# Patient Record
Sex: FEMALE | Race: WHITE | Employment: OTHER | URBAN - METROPOLITAN AREA
[De-identification: names, ages, dates, MRNs, and addresses within clinical notes are randomized per-mention and may not be internally consistent; named-entity substitution may affect disease eponyms.]

---

## 2017-01-21 DIAGNOSIS — Z76.0 MEDICATION REFILL: ICD-10-CM

## 2017-01-22 RX ORDER — COLESEVELAM HYDROCHLORIDE 625 MG/1
TABLET, FILM COATED ORAL
Qty: 180 TAB | Refills: 3 | Status: SHIPPED | OUTPATIENT
Start: 2017-01-22 | End: 2017-05-20 | Stop reason: SDUPTHER

## 2017-02-21 RX ORDER — GLIPIZIDE 10 MG/1
TABLET, FILM COATED, EXTENDED RELEASE ORAL
Qty: 60 TAB | Refills: 10 | Status: SHIPPED | OUTPATIENT
Start: 2017-02-21 | End: 2017-02-23 | Stop reason: SDUPTHER

## 2017-02-23 ENCOUNTER — OFFICE VISIT (OUTPATIENT)
Dept: INTERNAL MEDICINE CLINIC | Age: 77
End: 2017-02-23

## 2017-02-23 VITALS
RESPIRATION RATE: 16 BRPM | BODY MASS INDEX: 33.04 KG/M2 | OXYGEN SATURATION: 96 % | TEMPERATURE: 97.9 F | HEART RATE: 65 BPM | WEIGHT: 198.3 LBS | SYSTOLIC BLOOD PRESSURE: 160 MMHG | DIASTOLIC BLOOD PRESSURE: 52 MMHG | HEIGHT: 65 IN

## 2017-02-23 DIAGNOSIS — E11.3299 TYPE 2 DIABETES MELLITUS WITH MILD NONPROLIFERATIVE RETINOPATHY WITHOUT MACULAR EDEMA, WITHOUT LONG-TERM CURRENT USE OF INSULIN, UNSPECIFIED LATERALITY (HCC): Primary | Chronic | ICD-10-CM

## 2017-02-23 DIAGNOSIS — Z76.0 MEDICATION REFILL: ICD-10-CM

## 2017-02-23 DIAGNOSIS — Z23 ENCOUNTER FOR IMMUNIZATION: ICD-10-CM

## 2017-02-23 DIAGNOSIS — I10 ESSENTIAL HYPERTENSION: Chronic | ICD-10-CM

## 2017-02-23 DIAGNOSIS — E07.9 THYROID DISEASE: Chronic | ICD-10-CM

## 2017-02-23 RX ORDER — GLIPIZIDE 5 MG/1
TABLET, FILM COATED, EXTENDED RELEASE ORAL
Qty: 90 TAB | Refills: 11 | Status: SHIPPED | OUTPATIENT
Start: 2017-02-23 | End: 2018-02-24 | Stop reason: SDUPTHER

## 2017-02-23 RX ORDER — GLIPIZIDE 10 MG/1
TABLET, FILM COATED, EXTENDED RELEASE ORAL
Qty: 60 TAB | Refills: 10 | Status: CANCELLED | OUTPATIENT
Start: 2017-02-23

## 2017-02-23 RX ORDER — LEVOTHYROXINE SODIUM 112 UG/1
112 TABLET ORAL
Qty: 90 TAB | Refills: 4 | Status: SHIPPED | OUTPATIENT
Start: 2017-02-23 | End: 2018-03-09 | Stop reason: SDUPTHER

## 2017-02-23 RX ORDER — FUROSEMIDE 20 MG/1
10 TABLET ORAL DAILY
Qty: 45 TAB | Refills: 11 | Status: SHIPPED | OUTPATIENT
Start: 2017-02-23 | End: 2017-08-04

## 2017-02-23 NOTE — MR AVS SNAPSHOT
Visit Information Date & Time Provider Department Dept. Phone Encounter #  
 2/23/2017 11:00 AM Friendshipaaron GuzmanSherry Kout [phone] [id number] Follow-up Instructions Return in about 4 months (around 6/23/2017) for HTN, DM, cholesterol. Upcoming Health Maintenance Date Due Pneumococcal 65+ Low/Medium Risk (2 of 2 - PPSV23) 10/1/2015 FOOT EXAM Q1 2/26/2017 MICROALBUMIN Q1 2/26/2017 HEMOGLOBIN A1C Q6M 4/3/2017 EYE EXAM RETINAL OR DILATED Q1 6/8/2017 MEDICARE YEARLY EXAM 6/18/2017 LIPID PANEL Q1 10/3/2017 GLAUCOMA SCREENING Q2Y 6/8/2018 DTaP/Tdap/Td series (2 - Td) 6/17/2026 Allergies as of 2/23/2017  Review Complete On: 2/23/2017 By: Patrica Guzman MD  
  
 Severity Noted Reaction Type Reactions Latex High 11/06/2012   Topical Rash Other Plant, Animal, Environmental Medium 08/14/2014   Intolerance Other (comments) Surgical Steel, Related to ankle fracture and it caused bursitis and was removed. Acetaminophen  11/15/2011    Other (comments) Abdominal pain Adhesive  11/06/2013    Hives Pt also states she has burning and stinging Morphine  12/03/2013    Other (comments) Agitated, nasty Statins-hmg-coa Reductase Inhibitors  11/16/2011    Other (comments) Leg cramps Sulfa (Sulfonamide Antibiotics)  11/16/2011    Rash Tramadol  11/16/2011    Nausea Only Current Immunizations  Never Reviewed Name Date Influenza High Dose Vaccine PF 10/6/2016 Pneumococcal Conjugate (PCV-13)  Incomplete Pneumococcal Vaccine (Unspecified Type) 10/1/2010 Tdap 6/17/2016 10:55 AM  
 Zoster 1/1/2008 Not reviewed this visit You Were Diagnosed With   
  
 Codes Comments Type 2 diabetes mellitus with mild nonproliferative retinopathy without macular edema, without long-term current use of insulin, unspecified laterality    -  Primary ICD-10-CM: O80.6635 ICD-9-CM: 250.50, 362.04 Essential hypertension     ICD-10-CM: I10 
ICD-9-CM: 401.9 Thyroid disease     ICD-10-CM: E07.9 ICD-9-CM: 246.9 Medication refill     ICD-10-CM: Z76.0 ICD-9-CM: V68.1 Encounter for immunization     ICD-10-CM: C07 ICD-9-CM: V03.89 Vitals BP  
  
  
  
  
  
 160/52 BMI and BSA Data Body Mass Index Body Surface Area  
 33 kg/m 2 2.03 m 2 Preferred Pharmacy Pharmacy Name Phone [facility] [place], [address] [place], [place] [id number] [phone] Your Updated Medication List  
  
   
This list is accurate as of: 2/23/17 12:05 PM.  Always use your most recent med list.  
  
  
  
  
 ALPHAGAN P 0.1 % ophthalmic solution Generic drug:  brimonidine  
every eight (8) hours. ammonium lactate 12 % topical cream  
Commonly known as:  AMLACTIN Apply  to affected area two (2) times a day. rub in to affected area well  
  
 aspirin delayed-release 81 mg tablet Take  by mouth daily. estradiol 0.01 % (0.1 mg/gram) vaginal cream  
Commonly known as:  ESTRACE Insert 2 g into vagina daily. etodolac 400 mg tablet Commonly known as:  LODINE  
TAKE ONE TABLET BY MOUTH DAILY (GENERIC LODINE) furosemide 20 mg tablet Commonly known as:  LASIX Take 0.5 Tabs by mouth daily. Indications: Edema  
  
 glipiZIDE SR 5 mg CR tablet Commonly known as:  GLUCOTROL XL Take 2 tablets by mouth in the morning and one in the evening with meals  
  
 levothyroxine 112 mcg tablet Commonly known as:  SYNTHROID Take 1 Tab by mouth Daily (before breakfast). Indications: hypothyroidism  
  
 metoprolol succinate 50 mg XL tablet Commonly known as:  TOPROL-XL Take 1 Tab by mouth daily. Indications: HYPERTENSION  
  
 naproxen-diphenhydramine 220-25 mg Tab Take  by mouth. omeprazole 20 mg capsule Commonly known as:  PRILOSEC  
TAKE ONE CAPSULE BY MOUTH EVERY DAY WELCHOL 625 mg tablet Generic drug:  colesevelam  
TAKE THREE TABLETS BY MOUTH TWICE A DAY  
  
 zolpidem 10 mg tablet Commonly known as:  AMBIEN  
TAKE ONE TABLET BY MOUTH NIGHTLY AS NEEDED FOR SLEEP Prescriptions Sent to Pharmacy Refills  
 levothyroxine (SYNTHROID) 112 mcg tablet 4 Sig: Take 1 Tab by mouth Daily (before breakfast). Indications: hypothyroidism Class: Normal  
 Pharmacy: [address] Ph #: [phone] Route: Oral  
 furosemide (LASIX) 20 mg tablet 11 Sig: Take 0.5 Tabs by mouth daily. Indications: Edema Class: Normal  
 Pharmacy: [address] Ph #: [phone] Route: Oral  
 glipiZIDE SR (GLUCOTROL XL) 5 mg CR tablet 11 Sig: Take 2 tablets by mouth in the morning and one in the evening with meals Class: Normal  
 Pharmacy: [address] Ph #: [phone] We Performed the Following ADMIN PNEUMOCOCCAL VACCINE [ [facility]] HM DIABETES FOOT EXAM [HM[facility]] PNEUMOCOCCAL CONJ VACCINE 13 VALENT IM [id number] CPT(R)] Follow-up Instructions Return in about 4 months (around 6/23/2017) for HTN, DM, cholesterol. Introducing [facility]! Romayne Duster introduces [employer] patient portal. Now you can access parts of your medical record, email your doctor's office, and request medication refills online. 1. In your internet browser, go to [email or web address] 2. Click on the First Time User? Click Here link in the Sign In box. You will see the New Member Sign Up page. 3. Enter your [employer] Access Code exactly as it appears below. You will not need to use this code after youve completed the sign-up process. If you do not sign up before the expiration date, you must request a new code. · [employer] Access Code: [email or web address] Expires: 5/24/2017 12:04 PM 
 
 4. Enter the last four digits of your Social Security Number (xxxx) and Date of Birth (mm/dd/yyyy) as indicated and click Submit. You will be taken to the next sign-up page. 5. Create a eCo[employer] ID. This will be your [employer] login ID and cannot be changed, so think of one that is secure and easy to remember. 6. Create a e[employer] password. You can change your password at any time. 7. Enter your Password Reset Question and Answer. This can be used at a later time if you forget your password. 8. Enter your e-mail address. You will receive e-mail notification when new information is available in [address] 9. Click Sign Up. You can now view and download portions of your medical record. 10. Click the Download Summary menu link to download a portable copy of your medical information. If you have questions, please visit the Frequently Asked Questions section of the [employer] website. Remember, [employer] is NOT to be used for urgent needs. For medical emergencies, dial 911. Now available from your iPhone and Android! Please provide this summary of care documentation to your next provider. Your primary care clinician is listed as [facility]. If you have any questions after today's visit, please call [phone].

## 2017-02-23 NOTE — PROGRESS NOTES
ROOM # 4    Pt presents today for follow up HTN, DM, Chol    Pt preferred language for health care discussion is english. Is someone accompanying this pt? no    Is the patient using any DME equipment during OV? no    Depression Screening completed. Active Dx    Learning Assessment completed. Yes    Abuse Screening completed. Yes    Health Maintenance reviewed and discussed per provider. Yes, up to date    Advance Directive:  1. Do you have an advance directive in place? Patient Reply: No    2. If not, would you like material regarding how to put one in place? Patient Reply: No    Coordination of Care:  1. Have you been to the ER, urgent care clinic since your last visit? Hospitalized since your last visit? No    2. Have you seen or consulted any other health care providers outside of the [address] [place] [address] since your last visit? Include any pap smears or colon screening.  No

## 2017-02-23 NOTE — PROGRESS NOTES
HISTORY OF PRESENT ILLNESS  Sabrina Bermudez is a 64 y.o. female. Visit Vitals    /52    Pulse 65    Temp 97.9 °F (36.6 °C) (Oral)    Resp 16    Ht 5' 5\" (1.651 m)    Wt 198 lb 4.8 oz (89.9 kg)    SpO2 96%    BMI 33 kg/m2       HPI Comments: Saw Dr. Luis Valiente, podiatry, for foot pain    Pt also saw Dr. Baltazar Becerra who advised laser surgery. She did not go back due to other illness intervene. She does not want to have surgery at [place] [address] but she felt his office is not clean or hygienic  She will contact him at some point to get this rescheduled. Hypertension    The history is provided by the patient. This is a chronic problem. The current episode started more than 1 week ago. The problem has not changed since onset. Pertinent negatives include no chest pain, no orthopnea and no palpitations. Associated symptoms comments: Other  . There are no associated agents to hypertension. Risk factors include postmenopause, obesity, a sedentary lifestyle, diabetes mellitus and dyslipidemia. Cholesterol Problem   The history is provided by the patient. This is a chronic problem. The current episode started more than 1 week ago. The problem occurs daily. Pertinent negatives include no chest pain. Exacerbated by: diet. The symptoms are relieved by medications (diet). Diabetes   The history is provided by the patient. This is a chronic problem. The current episode started more than 1 week ago. The problem occurs daily. Pertinent negatives include no chest pain. Exacerbated by: diet. The symptoms are relieved by medications (diet). Treatments tried: see meds. The treatment provided mild relief. Review of Systems   Constitutional: Negative. Respiratory: Negative. Cardiovascular: Positive for leg swelling. Negative for chest pain, palpitations, orthopnea and claudication. Physical Exam   Constitutional: She is oriented to person, place, and time. She appears well-developed and well-nourished. No distress. HENT:   Right Ear: Tympanic membrane, external ear and ear canal normal.   Left Ear: Tympanic membrane, external ear and ear canal normal.   Mouth/Throat: Uvula is midline, oropharynx is clear and moist and mucous membranes are normal.   Cardiovascular: Normal rate. Pulmonary/Chest: Effort normal.   Musculoskeletal: She exhibits no edema. Neurological: She is alert and oriented to person, place, and time. Diabetic foot exam:     Left: Reflexes 1+     Vibratory sensation     Proprioception normal   Sharp/dull discrimination     Filament test normal sensation with micro filament   Pulse DP: 1+ (weak)   Pulse PT:    Deformities: Yes - cool toes. Several healing wounds. Blisters on heel and lateral foot. Edema to above the ankle    Right: Reflexes 1+   Vibratory sensation    Proprioception normal   Sharp/dull discrimination    Filament test normal sensation with micro filament   Pulse DP: 1+ (weak)   Pulse PT:    Deformities: Mild - edema. Pressure callous under metatarsal.      Skin: Skin is warm and dry. She is not diaphoretic. Psychiatric: She has a normal mood and affect. Nursing note and vitals reviewed. ASSESSMENT and PLAN    ICD-10-CM ICD-9-CM    1. Type 2 diabetes mellitus with mild nonproliferative retinopathy without macular edema, without long-term current use of insulin, unspecified laterality E11.3299 250.50 HM DIABETES FOOT EXAM     362.04    2. Essential hypertension I10 401.9    3. hypothyroid E07.9 246.9    4. Medication refill Z76.0 V68.1 levothyroxine (SYNTHROID) 112 mcg tablet      furosemide (LASIX) 20 mg tablet      glipiZIDE SR (GLUCOTROL XL) 5 mg CR tablet   5. Encounter for immunization Z23 V03.89 PNEUMOCOCCAL CONJ VACCINE 13 VALENT IM      ADMIN PNEUMOCOCCAL VACCINE       She still has lab pending--will get done soon    She will f/u with Dr. Yang Lomeli and Dr. Sam Calixto. BP up some but is better overall    Weight discussed. She is working on diet.     F/u 4 months

## 2017-04-10 RX ORDER — OMEPRAZOLE 20 MG/1
CAPSULE, DELAYED RELEASE ORAL
Qty: 30 CAP | Refills: 9 | Status: SHIPPED | OUTPATIENT
Start: 2017-04-10 | End: 2018-04-29 | Stop reason: SDUPTHER

## 2017-04-17 RX ORDER — OMEPRAZOLE 20 MG/1
CAPSULE, DELAYED RELEASE ORAL
Qty: 30 CAP | Refills: 9 | Status: SHIPPED | OUTPATIENT
Start: 2017-04-17 | End: 2017-08-04 | Stop reason: SDUPTHER

## 2017-05-20 DIAGNOSIS — Z76.0 MEDICATION REFILL: ICD-10-CM

## 2017-05-21 RX ORDER — COLESEVELAM HYDROCHLORIDE 625 MG/1
TABLET, FILM COATED ORAL
Qty: 180 TAB | Refills: 2 | Status: SHIPPED | OUTPATIENT
Start: 2017-05-21 | End: 2017-08-04 | Stop reason: SDUPTHER

## 2017-06-19 DIAGNOSIS — Z76.0 MEDICATION REFILL: ICD-10-CM

## 2017-06-19 RX ORDER — METOPROLOL SUCCINATE 50 MG/1
50 TABLET, EXTENDED RELEASE ORAL DAILY
Qty: 90 TAB | Refills: 4 | Status: SHIPPED | OUTPATIENT
Start: 2017-06-19 | End: 2017-10-15 | Stop reason: SDUPTHER

## 2017-06-26 DIAGNOSIS — Z76.0 MEDICATION REFILL: ICD-10-CM

## 2017-06-26 RX ORDER — ZOLPIDEM TARTRATE 10 MG/1
TABLET ORAL
Qty: 30 TAB | Refills: 5 | Status: SHIPPED | OUTPATIENT
Start: 2017-06-26 | End: 2017-11-26

## 2017-07-20 ENCOUNTER — TELEPHONE (OUTPATIENT)
Dept: INTERNAL MEDICINE CLINIC | Age: 77
End: 2017-07-20

## 2017-07-20 NOTE — TELEPHONE ENCOUNTER
Patient states she is returning a call to the office, do not see a message in the system. Patient states the person did not leave a name but had a really soft voice? ??

## 2017-08-04 ENCOUNTER — HOSPITAL ENCOUNTER (OUTPATIENT)
Dept: LAB | Age: 77
Discharge: HOME | End: 2017-08-04
Payer: MEDICARE

## 2017-08-04 ENCOUNTER — OFFICE VISIT (OUTPATIENT)
Dept: INTERNAL MEDICINE CLINIC | Age: 77
End: 2017-08-04

## 2017-08-04 VITALS
SYSTOLIC BLOOD PRESSURE: 160 MMHG | HEART RATE: 66 BPM | TEMPERATURE: 97.4 F | RESPIRATION RATE: 16 BRPM | BODY MASS INDEX: 32.49 KG/M2 | DIASTOLIC BLOOD PRESSURE: 110 MMHG | WEIGHT: 195 LBS | OXYGEN SATURATION: 97 % | HEIGHT: 65 IN

## 2017-08-04 DIAGNOSIS — E11.3299 TYPE 2 DIABETES MELLITUS WITH MILD NONPROLIFERATIVE RETINOPATHY WITHOUT MACULAR EDEMA, WITHOUT LONG-TERM CURRENT USE OF INSULIN, UNSPECIFIED LATERALITY (HCC): Chronic | ICD-10-CM

## 2017-08-04 DIAGNOSIS — E78.00 HYPERCHOLESTEROLEMIA: Chronic | ICD-10-CM

## 2017-08-04 DIAGNOSIS — Z00.00 ROUTINE GENERAL MEDICAL EXAMINATION AT A HEALTH CARE FACILITY: Primary | ICD-10-CM

## 2017-08-04 DIAGNOSIS — E07.9 THYROID DISEASE: Chronic | ICD-10-CM

## 2017-08-04 DIAGNOSIS — I10 ESSENTIAL HYPERTENSION: Chronic | ICD-10-CM

## 2017-08-04 DIAGNOSIS — Z13.39 SCREENING FOR ALCOHOLISM: ICD-10-CM

## 2017-08-04 DIAGNOSIS — Z76.0 MEDICATION REFILL: ICD-10-CM

## 2017-08-04 LAB
ALBUMIN SERPL BCP-MCNC: 3.8 G/DL (ref 3.4–5)
ALBUMIN/GLOB SERPL: 1 {RATIO} (ref 0.8–1.7)
ALP SERPL-CCNC: 122 U/L (ref 45–117)
ALT SERPL-CCNC: 21 U/L (ref 13–56)
ANION GAP BLD CALC-SCNC: 7 MMOL/L (ref 3–18)
AST SERPL W P-5'-P-CCNC: 22 U/L (ref 15–37)
BILIRUB SERPL-MCNC: 0.5 MG/DL (ref 0.2–1)
BUN SERPL-MCNC: 18 MG/DL (ref 7–18)
BUN/CREAT SERPL: 22 (ref 12–20)
CALCIUM SERPL-MCNC: 9.2 MG/DL (ref 8.5–10.1)
CHLORIDE SERPL-SCNC: 95 MMOL/L (ref 100–108)
CHOLEST SERPL-MCNC: 177 MG/DL
CO2 SERPL-SCNC: 29 MMOL/L (ref 21–32)
CREAT SERPL-MCNC: 0.81 MG/DL (ref 0.6–1.3)
CREAT UR-MCNC: 43.02 MG/DL (ref 30–125)
EST. AVERAGE GLUCOSE BLD GHB EST-MCNC: 131 MG/DL
GLOBULIN SER CALC-MCNC: 3.9 G/DL (ref 2–4)
GLUCOSE SERPL-MCNC: 105 MG/DL (ref 74–99)
HBA1C MFR BLD: 6.2 % (ref 4.2–5.6)
HDLC SERPL-MCNC: 62 MG/DL (ref 40–60)
HDLC SERPL: 2.9 {RATIO} (ref 0–5)
LDLC SERPL CALC-MCNC: 82.4 MG/DL (ref 0–100)
LIPID PROFILE,FLP: ABNORMAL
MICROALBUMIN UR-MCNC: 19.5 MG/DL (ref 0–3)
MICROALBUMIN/CREAT UR-RTO: 453 MG/G (ref 0–30)
POTASSIUM SERPL-SCNC: 4.8 MMOL/L (ref 3.5–5.5)
PROT SERPL-MCNC: 7.7 G/DL (ref 6.4–8.2)
SODIUM SERPL-SCNC: 131 MMOL/L (ref 136–145)
T4 FREE SERPL-MCNC: 1.3 NG/DL (ref 0.7–1.5)
TRIGL SERPL-MCNC: 163 MG/DL (ref ?–150)
TSH SERPL DL<=0.05 MIU/L-ACNC: 3.02 UIU/ML (ref 0.36–3.74)
VLDLC SERPL CALC-MCNC: 32.6 MG/DL

## 2017-08-04 PROCEDURE — 84439 ASSAY OF FREE THYROXINE: CPT | Performed by: INTERNAL MEDICINE

## 2017-08-04 PROCEDURE — 80053 COMPREHEN METABOLIC PANEL: CPT | Performed by: INTERNAL MEDICINE

## 2017-08-04 PROCEDURE — 83036 HEMOGLOBIN GLYCOSYLATED A1C: CPT | Performed by: INTERNAL MEDICINE

## 2017-08-04 PROCEDURE — 80061 LIPID PANEL: CPT | Performed by: INTERNAL MEDICINE

## 2017-08-04 PROCEDURE — 36415 COLL VENOUS BLD VENIPUNCTURE: CPT | Performed by: INTERNAL MEDICINE

## 2017-08-04 PROCEDURE — 82043 UR ALBUMIN QUANTITATIVE: CPT | Performed by: INTERNAL MEDICINE

## 2017-08-04 RX ORDER — COLESEVELAM 180 1/1
TABLET ORAL
Qty: 180 TAB | Refills: 5 | Status: SHIPPED | OUTPATIENT
Start: 2017-08-04 | End: 2018-02-15 | Stop reason: SDUPTHER

## 2017-08-04 NOTE — MR AVS SNAPSHOT
Visit Information Date & Time Provider Department Dept. Phone Encounter #  
 8/4/2017 12:30 PM Jenny Pinon [address] [phone] [id number] Follow-up Instructions Return in about 6 months (around 2/4/2018) for HTN, DM, cholesterol. Upcoming Health Maintenance Date Due MICROALBUMIN Q1 2/26/2017 HEMOGLOBIN A1C Q6M 4/3/2017 EYE EXAM RETINAL OR DILATED Q1 6/8/2017 MEDICARE YEARLY EXAM 6/18/2017 INFLUENZA AGE 9 TO ADULT 8/1/2017 LIPID PANEL Q1 10/3/2017 FOOT EXAM Q1 2/23/2018 GLAUCOMA SCREENING Q2Y 6/8/2018 DTaP/Tdap/Td series (2 - Td) 6/17/2026 Allergies as of 8/4/2017  Review Complete On: 8/4/2017 By: Jenny Pinon MD  
  
 Severity Noted Reaction Type Reactions Latex High 11/06/2012   Topical Rash Other Plant, Animal, Environmental Medium 08/14/2014   Intolerance Other (comments) Surgical Steel, Related to ankle fracture and it caused bursitis and was removed. Acetaminophen  11/15/2011    Other (comments) Abdominal pain Adhesive  11/06/2013    Hives Pt also states she has burning and stinging Morphine  12/03/2013    Other (comments) Agitated, nasty Statins-hmg-coa Reductase Inhibitors  11/16/2011    Other (comments) Leg cramps Sulfa (Sulfonamide Antibiotics)  11/16/2011    Rash Tramadol  11/16/2011    Nausea Only Current Immunizations  Never Reviewed Name Date Influenza High Dose Vaccine PF 10/6/2016 Pneumococcal Conjugate (PCV-13) 2/23/2017 12:15 PM  
 Tdap 6/17/2016 10:55 AM  
 ZZZ-RETIRED (DO NOT USE) Pneumococcal Vaccine (Unspecified Type) 10/1/2010 Zoster 1/1/2008 Not reviewed this visit You Were Diagnosed With   
  
 Codes Comments Routine general medical examination at a health care facility    -  Primary ICD-10-CM: Z00.00 ICD-9-CM: V70.0 Screening for alcoholism     ICD-10-CM: Z13.89 ICD-9-CM: V79.1 Type 2 diabetes mellitus with mild nonproliferative retinopathy without macular edema, without long-term current use of insulin, unspecified laterality ([facility]     ICD-10-CM: N19.3375 ICD-9-CM: 250.50, 362.04 Thyroid disease     ICD-10-CM: E07.9 ICD-9-CM: 246.9 Hypercholesterolemia     ICD-10-CM: E78.00 ICD-9-CM: 272.0 Essential hypertension     ICD-10-CM: I10 
ICD-9-CM: 401.9 Medication refill     ICD-10-CM: Z76.0 ICD-9-CM: V68.1 Vitals BP Pulse Temp Resp Height(growth percentile) Weight(growth percentile) (!) 160/110 (BP 1 Location: Left arm, BP Patient Position: Sitting) 66 97.4 °F (36.3 °C) (Oral) 16 5' 5\" (1.651 m) 195 lb (88.5 kg) SpO2 BMI OB Status Smoking Status 97% 32.45 kg/m2 Postmenopausal Never Smoker Vitals History BMI and BSA Data Body Mass Index Body Surface Area  
 32.45 kg/m 2 2.01 m 2 Preferred Pharmacy Pharmacy Name Phone Ayden Kumarcel [address] [place] [address], [address] [place] [address] [address] [phone] Your Updated Medication List  
  
   
This list is accurate as of: 8/4/17  1:37 PM.  Always use your most recent med list.  
  
  
  
  
 ALPHAGAN P 0.1 % ophthalmic solution Generic drug:  brimonidine  
every eight (8) hours. ammonium lactate 12 % topical cream  
Commonly known as:  AMLACTIN Apply  to affected area two (2) times a day. rub in to affected area well  
  
 aspirin delayed-release 81 mg tablet Take  by mouth daily. colesevelam 625 mg tablet Commonly known as:  WELCHOL  
TAKE THREE TABLETS BY MOUTH TWO TIMES A DAY  
  
 estradiol 0.01 % (0.1 mg/gram) vaginal cream  
Commonly known as:  ESTRACE Insert 2 g into vagina daily. etodolac 400 mg tablet Commonly known as:  LODINE  
TAKE ONE TABLET BY MOUTH DAILY (GENERIC LODINE) glipiZIDE SR 5 mg CR tablet Commonly known as:  GLUCOTROL XL Take 2 tablets by mouth in the morning and one in the evening with meals levothyroxine 112 mcg tablet Commonly known as:  SYNTHROID Take 1 Tab by mouth Daily (before breakfast). Indications: hypothyroidism  
  
 metoprolol succinate 50 mg XL tablet Commonly known as:  TOPROL-XL Take 1 Tab by mouth daily. Indications: hypertension  
  
 naproxen-diphenhydramine 220-25 mg Tab Take  by mouth. omeprazole 20 mg capsule Commonly known as:  PRILOSEC  
TAKE ONE CAPSULE BY MOUTH DAILY  
  
 zolpidem 10 mg tablet Commonly known as:  AMBIEN  
TAKE ONE TABLET BY MOUTH EVERY NIGHT AS NEEDED FOR SLEEP Prescriptions Sent to Pharmacy Refills  
 colesevelam (WELCHOL) 625 mg tablet 5 Sig: TAKE THREE TABLETS BY MOUTH TWO TIMES A DAY Class: Normal  
 Pharmacy: [address], [address] [place] [address] #: [phone] Follow-up Instructions Return in about 6 months (around 2/4/2018) for HTN, DM, cholesterol. To-Do List   
 08/04/2017 Lab:  HEMOGLOBIN A1C WITH EAG   
  
 08/04/2017 Lab:  LIPID PANEL   
  
 08/04/2017 Lab:  METABOLIC PANEL, COMPREHENSIVE   
  
 08/04/2017 Lab:  MICROALBUMIN, UR, RAND W/ MICROALBUMIN/CREA RATIO   
  
 08/04/2017 Lab:  TSH AND FREE T4 Introducing \[facility]\"" [facility]! Kandice Grimes introduces [employer] patient portal. Now you can access parts of your medical record, email your doctor's office, and request medication refills online. 1. In your internet browser, go to [email or web address] 2. Click on the First Time User? Click Here link in the Sign In box. You will see the New Member Sign Up page. 3. Enter your [employer] Access Code exactly as it appears below. You will not need to use this code after youve completed the sign-up process. If you do not sign up before the expiration date, you must request a new code. · [employer] Access Code: [email or web address] Expires: 11/2/2017  1:37 PM 
 
 4. Enter the last four digits of your Social Security Number (xxxx) and Date of Birth (mm/dd/yyyy) as indicated and click Submit. You will be taken to the next sign-up page. 5. Create a [employer] ID. This will be your [employer] login ID and cannot be changed, so think of one that is secure and easy to remember. 6. Create a [employer] password. You can change your password at any time. 7. Enter your Password Reset Question and Answer. This can be used at a later time if you forget your password. 8. Enter your e-mail address. You will receive e-mail notification when new information is available in [address] 9. Click Sign Up. You can now view and download portions of your medical record. 10. Click the Download Summary menu link to download a portable copy of your medical information. If you have questions, please visit the Frequently Asked Questions section of the [employer] website. Remember, [employer] is NOT to be used for urgent needs. For medical emergencies, dial 911. Now available from your iPhone and Android! Please provide this summary of care documentation to your next provider. Your primary care clinician is listed as [facility]. If you have any questions after today's visit, please call [phone].

## 2017-08-04 NOTE — PROGRESS NOTES
Sanjuanita Cruz presents today for   Chief Complaint   Patient presents with    Annual Wellness Visit     Pt stated she has had some urinary frequency and pressure but no burning  but will use AZO. Pt stated she has acquired a tens unit to help with pain along with warm/cold compress for pain related to ankle pain. Pt stated she stopped taking lasix due to blurred vision. Offered pt items to get undressed for evaluation of [address] pt stated \" Oh no I never get undressed. \"     Sanjuanita Cruz preferred language for health care discussion is english/other. Is someone accompanying this pt? No    Is the patient using any DME equipment during OV?   Yes; rollator    Depression Screening:  PHQ over the last two weeks 8/4/2017 2/23/2017 6/17/2016 2/26/2016 3/11/2015 4/14/2014 4/14/2014   PHQ Not Done - Active Diagnosis of Depression or Bipolar Disorder - - - - -   Little interest or pleasure in doing things Several days - Not at all Not at all Several days Nearly every day Nearly every day   Feeling down, depressed or hopeless Several days - Not at all Not at all Not at all Nearly every day Nearly every day   Total Score PHQ 2 2 - 0 0 1 6 6   Trouble falling or staying asleep, or sleeping too much - - - - - Not at all Not at all   Feeling tired or having little energy - - - - - Not at all Not at all   Poor appetite or overeating - - - - - Not at all Not at all   Feeling bad about yourself - or that you are a failure or have let yourself or your family down - - - - - Not at all Not at all   Trouble concentrating on things such as school, work, reading or watching TV - - - - - Not at all -   Moving or speaking so slowly that other people could have noticed; or the opposite being so fidgety that others notice - - - - - Not at all -   Thoughts of being better off dead, or hurting yourself in some way - - - - - Not at all -   How difficult have these problems made it for you to do your work, take care of your home and get along with others - - - - - Somewhat difficult -       Learning Assessment:  Learning Assessment 11/6/2013   PRIMARY LEARNER Patient   PRIMARY LANGUAGE ENGLISH   LEARNER PREFERENCE PRIMARY READING     DEMONSTRATION     VIDEOS     PICTURES     LISTENING   ANSWERED BY patient   RELATIONSHIP SELF       Abuse Screening:  Completed    Fall Risk  Fall Risk Assessment, last 12 mths 8/4/2017 2/23/2017 6/17/2016 2/26/2016 3/11/2015 4/14/2014   Able to walk? Yes Yes Yes Yes Yes Yes   Fall in past 12 months? No No No No Yes No   Fall with injury? - - - - Yes -   Number of falls in past 12 months - - - - 5 -   Fall Risk Score - - - - 6 -       Health Maintenance reviewed and discussed per provider. Yes    Amelie Carrera is due for micro( pended), a1c(pended) and eye exam( completed). Please order/place referral if appropriate. Advance Directive:  1. Do you have an advance directive in place? Patient Reply: yes    2. If not, would you like material regarding how to put one in place? Patient Reply: No    Coordination of Care:  1. Have you been to the ER, urgent care clinic since your last visit? Hospitalized since your last visit? No    2. Have you seen or consulted any other health care providers outside of the [address] [place] [address] since your last visit? Ys, Dr Bailey Duke (Dermatology) Paperwork given for retrieval of information.

## 2017-08-04 NOTE — PROGRESS NOTES
HISTORY OF PRESENT ILLNESS  Mayela Alcantar is a 76 y.o. female. Visit Vitals    BP (!) 160/110 (BP 1 Location: Left arm, BP Patient Position: Sitting)    Pulse 66    Temp 97.4 °F (36.3 °C) (Oral)    Resp 16    Ht 5' 5\" (1.651 m)    Wt 195 lb (88.5 kg)    SpO2 97%    BMI 32.45 kg/m2       Hypertension    The history is provided by the patient. This is a chronic problem. The current episode started more than 1 week ago. The problem has not changed since onset. Pertinent negatives include no chest pain, no palpitations, no blurred vision and no shortness of breath. There are no associated agents to hypertension. Risk factors include postmenopause, diabetes mellitus and dyslipidemia. Diabetes   The history is provided by the patient. This is a chronic problem. The current episode started more than 1 week ago. The problem occurs daily. The problem has not changed since onset. Pertinent negatives include no chest pain and no shortness of breath. Exacerbated by: diet. The symptoms are relieved by medications (diet). Treatments tried: see med list. The treatment provided moderate relief. Review of Systems   Constitutional: Negative. Eyes: Negative for blurred vision. Respiratory: Negative for shortness of breath. Cardiovascular: Positive for leg swelling. Negative for chest pain and palpitations. Genitourinary: Negative for frequency. Endo/Heme/Allergies: Negative for polydipsia. Physical Exam   Constitutional: She is oriented to person, place, and time. She appears well-developed and well-nourished. No distress. Cardiovascular: Normal rate and regular rhythm. Pulmonary/Chest: Effort normal and breath sounds normal.   Musculoskeletal: She exhibits no edema. Leg edema much better. Some skin changes of vascular disease   Neurological: She is alert and oriented to person, place, and time. Skin: Skin is warm and dry. She is not diaphoretic.    Psychiatric: She has a normal mood and affect. Nursing note and vitals reviewed. ASSESSMENT and PLAN    ICD-10-CM ICD-9-CM                   Type 2 diabetes mellitus with mild nonproliferative retinopathy without macular edema, without long-term current use of insulin, unspecified laterality ([facility] E11.3299 250.50 MICROALBUMIN, UR, RAND W/ MICROALBUMIN/CREA RATIO     362.04 HEMOGLOBIN A1C WITH EAG      METABOLIC PANEL, COMPREHENSIVE    hypothyroid E07.9 246.9 TSH AND FREE T4    Hypercholesterolemia E78.00 272.0 LIPID PANEL    Essential hypertension J06 347.1 METABOLIC PANEL, COMPREHENSIVE    Medication refill Z76.0 V68.1 colesevelam (WELCHOL) 625 mg tablet       BP up. ? Compliance with meds    BS has been OK    Update lab    Discussed BMI/weight, lifestyle, diet and exercise. Pt is aware of the effect on her BP and BS. F/u 6 months.  For HTN, DM, Lipid

## 2017-08-04 NOTE — ACP (ADVANCE CARE PLANNING)
Discussed with pt. She has filled out papers previously but does not know where they are. She does not want any resuscitation. She feels she has had a good life and accepts dying when it comes.  Does not want any life prolonging measures

## 2017-08-04 NOTE — PROGRESS NOTES
This is a Subsequent Medicare Annual Wellness Visit providing Personalized Prevention Plan Services (PPPS) (Performed 12 months after initial AWV and PPPS )    I have reviewed the patient's medical history in detail and updated the computerized patient record. History     Past Medical History:   Diagnosis Date    AC joint arthropathy     right    Acne rosacea     Bursitis of shoulder     rotator cuff    Diabetes ([address]     Diabetic retinopathy (HCC)     severe, non-proliferative    Discoid lupus     Duodenal ulcer     Facet joint disease     cervical spine, injections    Fibromyalgia     Hiatal hernia     Hypercholesterolemia     Hypertension     hypothyroid     Impingement syndrome of shoulder     left    Lumbar scoliosis     Nonexudative age-related macular degeneration     severe    Open-angle glaucoma     severe    Rectocele       Past Surgical History:   Procedure Laterality Date    COLONOSCOPY  2007    polyps, Dr. Jennifer Kerr HX CHOLECYSTECTOMY  2007    HX KNEE ARTHROSCOPY      HX SHOULDER ARTHROSCOPY      right     Current Outpatient Prescriptions   Medication Sig Dispense Refill    zolpidem (AMBIEN) 10 mg tablet TAKE ONE TABLET BY MOUTH EVERY NIGHT AS NEEDED FOR SLEEP 30 Tab 5    metoprolol succinate (TOPROL-XL) 50 mg XL tablet Take 1 Tab by mouth daily. Indications: hypertension 90 Tab 4    WELCHOL 625 mg tablet TAKE THREE TABLETS BY MOUTH TWO TIMES A  Tab 2    omeprazole (PRILOSEC) 20 mg capsule TAKE ONE CAPSULE BY MOUTH DAILY 30 Cap 9    levothyroxine (SYNTHROID) 112 mcg tablet Take 1 Tab by mouth Daily (before breakfast). Indications: hypothyroidism 90 Tab 4    glipiZIDE SR (GLUCOTROL XL) 5 mg CR tablet Take 2 tablets by mouth in the morning and one in the evening with meals 90 Tab 11    estradiol (ESTRACE) 0.01 % (0.1 mg/gram) vaginal cream Insert 2 g into vagina daily.  1 Tube 11    aspirin delayed-release 81 mg tablet Take  by mouth daily.  etodolac (LODINE) 400 mg tablet TAKE ONE TABLET BY MOUTH DAILY (GENERIC LODINE) 30 Tab 4    ammonium lactate (AMLACTIN) 12 % topical cream Apply  to affected area two (2) times a day. rub in to affected area well 140 g 5    naproxen-diphenhydramine 220-25 mg tab Take  by mouth.  brimonidine (ALPHAGAN P) 0.1 % ophthalmic solution every eight (8) hours. Allergies   Allergen Reactions    Latex Rash    Other Plant, Animal, Environmental Other (comments)     Surgical Steel, Related to ankle fracture and it caused bursitis and was removed.     Acetaminophen Other (comments)     Abdominal pain    Adhesive Hives     Pt also states she has burning and stinging    Morphine Other (comments)     Agitated, nasty    Statins-Hmg-Coa Reductase Inhibitors Other (comments)     Leg cramps    Sulfa (Sulfonamide Antibiotics) Rash    Tramadol Nausea Only     Family History   Problem Relation Age of Onset    No Known Problems Mother     No Known Problems Father      Social History   Substance Use Topics    Smoking status: Never Smoker    Smokeless tobacco: Never Used    Alcohol use Yes      Comment: Rarely     Patient Active Problem List   Diagnosis Code    Edema R60.9    Cough R05    Hypercholesterolemia E78.00    Fibromyalgia M79.7    hypothyroid E07.9    Dermatitis L30.9    Essential hypertension I10    Type 2 diabetes mellitus with mild nonproliferative diabetic retinopathy without macular edema (HCC) E11.3299    Diabetes mellitus due to underlying condition with complication, without long-term current use of insulin (HCC) E08.8       Depression Risk Factor Screening:     PHQ over the last two weeks 8/4/2017   PHQ Not Done -   Little interest or pleasure in doing things Several days   Feeling down, depressed or hopeless Several days   Total Score PHQ 2 2   Trouble falling or staying asleep, or sleeping too much -   Feeling tired or having little energy -   Poor appetite or overeating -   Feeling bad about yourself - or that you are a failure or have let yourself or your family down -   Trouble concentrating on things such as school, work, reading or watching TV -   Moving or speaking so slowly that other people could have noticed; or the opposite being so fidgety that others notice -   Thoughts of being better off dead, or hurting yourself in some way -   How difficult have these problems made it for you to do your work, take care of your home and get along with others -     Alcohol Risk Factor Screening: On any occasion during the past 3 months, have you had more than 3 drinks containing alcohol? No    Do you average more than 7 drinks per week? No        Functional Ability and Level of Safety:     Hearing Loss   none    Activities of Daily Living   Partial assistance. Requires assistance with: ambulation--uses a walker for stability when out but not at home    Fall Risk   Fall Risk Assessment, last 12 mths 8/4/2017   Able to walk? Yes   Fall in past 12 months? No   Fall with injury? -   Number of falls in past 12 months -   Fall Risk Score -     Abuse Screen   Patient is not abused    Review of Systems   A comprehensive review of systems was negative except for that written in the HPI. Physical Examination     Evaluation of Cognitive Function:  Mood/affect:  neutral  Appearance: age appropriate  Family member/caregiver input: self    No exam performed today, not indicated. 2/3 objects remembered.  3rd with a cue.  2/3 objects the second time    Patient Care Team:  Robert Camilo MD as PCP - General (Internal Medicine)  Liza Russo MD as Consulting Provider (Ophthalmology)  Marcel Urbano MD as Consulting Provider (Anesthesiology)  Marcos Mayorga MD as Consulting Provider (Dermatology)  Kalen Leon DPM as Consulting Provider (Podiatry)    Advice/Referrals/Counseling   Education and counseling provided:  Are appropriate based on today's review and evaluation      Assessment/Plan       ICD-10-CM ICD-9-CM    1. Routine general medical examination at a health care facility Z00.00 V70.0    2.  Screening for alcoholism Z13.89 V79.1

## 2017-10-14 DIAGNOSIS — Z76.0 MEDICATION REFILL: ICD-10-CM

## 2017-10-15 RX ORDER — METOPROLOL SUCCINATE 50 MG/1
TABLET, EXTENDED RELEASE ORAL
Qty: 30 TAB | Refills: 3 | Status: SHIPPED | OUTPATIENT
Start: 2017-10-15 | End: 2018-02-09 | Stop reason: SDUPTHER

## 2018-01-01 ENCOUNTER — OFFICE VISIT (OUTPATIENT)
Dept: INTERNAL MEDICINE CLINIC | Age: 78
End: 2018-01-01

## 2018-01-01 ENCOUNTER — HOSPITAL ENCOUNTER (OUTPATIENT)
Dept: LAB | Age: 78
Discharge: HOME | End: 2018-09-20
Payer: MEDICARE

## 2018-01-01 VITALS
HEART RATE: 75 BPM | SYSTOLIC BLOOD PRESSURE: 180 MMHG | TEMPERATURE: 96.8 F | BODY MASS INDEX: 34.52 KG/M2 | DIASTOLIC BLOOD PRESSURE: 100 MMHG | OXYGEN SATURATION: 98 % | WEIGHT: 207.2 LBS | HEIGHT: 65 IN | RESPIRATION RATE: 24 BRPM

## 2018-01-01 DIAGNOSIS — E11.21 TYPE 2 DIABETES WITH NEPHROPATHY (HCC): Chronic | ICD-10-CM

## 2018-01-01 DIAGNOSIS — Z76.0 MEDICATION REFILL: ICD-10-CM

## 2018-01-01 DIAGNOSIS — Z00.00 MEDICARE ANNUAL WELLNESS VISIT, SUBSEQUENT: Primary | ICD-10-CM

## 2018-01-01 DIAGNOSIS — I10 ESSENTIAL HYPERTENSION: Chronic | ICD-10-CM

## 2018-01-01 DIAGNOSIS — E78.00 HYPERCHOLESTEROLEMIA: Chronic | ICD-10-CM

## 2018-01-01 DIAGNOSIS — E07.9 THYROID DISEASE: Chronic | ICD-10-CM

## 2018-01-01 DIAGNOSIS — Z23 ENCOUNTER FOR IMMUNIZATION: ICD-10-CM

## 2018-01-01 LAB
ALBUMIN SERPL-MCNC: 3.7 G/DL (ref 3.4–5)
ALBUMIN/GLOB SERPL: 0.9 {RATIO} (ref 0.8–1.7)
ALP SERPL-CCNC: 109 U/L (ref 45–117)
ALT SERPL-CCNC: 20 U/L (ref 13–56)
ANION GAP SERPL CALC-SCNC: 9 MMOL/L (ref 3–18)
AST SERPL-CCNC: 24 U/L (ref 15–37)
BILIRUB SERPL-MCNC: 0.6 MG/DL (ref 0.2–1)
BUN SERPL-MCNC: 17 MG/DL (ref 7–18)
BUN/CREAT SERPL: 18 (ref 12–20)
CALCIUM SERPL-MCNC: 9 MG/DL (ref 8.5–10.1)
CHLORIDE SERPL-SCNC: 97 MMOL/L (ref 100–108)
CHOLEST SERPL-MCNC: 129 MG/DL
CO2 SERPL-SCNC: 26 MMOL/L (ref 21–32)
CREAT SERPL-MCNC: 0.96 MG/DL (ref 0.6–1.3)
CREAT UR-MCNC: 37.15 MG/DL (ref 30–125)
GLOBULIN SER CALC-MCNC: 3.9 G/DL (ref 2–4)
GLUCOSE SERPL-MCNC: 121 MG/DL (ref 74–99)
HBA1C MFR BLD: 7.6 % (ref 4.2–5.6)
HDLC SERPL-MCNC: 48 MG/DL (ref 40–60)
HDLC SERPL: 2.7 {RATIO} (ref 0–5)
LDLC SERPL CALC-MCNC: 58 MG/DL (ref 0–100)
LIPID PROFILE,FLP: NORMAL
MICROALBUMIN UR-MCNC: 71.4 MG/DL (ref 0–3)
MICROALBUMIN/CREAT UR-RTO: 1922 MG/G (ref 0–30)
POTASSIUM SERPL-SCNC: 4.4 MMOL/L (ref 3.5–5.5)
PROT SERPL-MCNC: 7.6 G/DL (ref 6.4–8.2)
SODIUM SERPL-SCNC: 132 MMOL/L (ref 136–145)
T4 FREE SERPL-MCNC: 1.3 NG/DL (ref 0.7–1.5)
TRIGL SERPL-MCNC: 115 MG/DL (ref ?–150)
TSH SERPL DL<=0.05 MIU/L-ACNC: 4.6 UIU/ML (ref 0.36–3.74)
VLDLC SERPL CALC-MCNC: 23 MG/DL

## 2018-01-01 PROCEDURE — 83036 HEMOGLOBIN GLYCOSYLATED A1C: CPT | Performed by: INTERNAL MEDICINE

## 2018-01-01 PROCEDURE — 80061 LIPID PANEL: CPT | Performed by: INTERNAL MEDICINE

## 2018-01-01 PROCEDURE — 84439 ASSAY OF FREE THYROXINE: CPT | Performed by: INTERNAL MEDICINE

## 2018-01-01 PROCEDURE — 80053 COMPREHEN METABOLIC PANEL: CPT | Performed by: INTERNAL MEDICINE

## 2018-01-01 PROCEDURE — 82043 UR ALBUMIN QUANTITATIVE: CPT | Performed by: INTERNAL MEDICINE

## 2018-01-01 RX ORDER — COLESEVELAM 180 1/1
TABLET ORAL
Qty: 180 TAB | Refills: 3 | Status: SHIPPED | OUTPATIENT
Start: 2018-01-01 | End: 2018-01-01 | Stop reason: SDUPTHER

## 2018-01-01 RX ORDER — METOPROLOL SUCCINATE 50 MG/1
50 TABLET, EXTENDED RELEASE ORAL DAILY
Qty: 90 TAB | Refills: 5 | Status: SHIPPED | OUTPATIENT
Start: 2018-01-01 | End: 2019-01-01

## 2018-01-01 RX ORDER — LEVOTHYROXINE SODIUM 112 UG/1
112 TABLET ORAL
Qty: 90 TAB | Refills: 5 | Status: SHIPPED | OUTPATIENT
Start: 2018-01-01 | End: 2019-01-01

## 2018-01-01 RX ORDER — COLESEVELAM 180 1/1
1875 TABLET ORAL 2 TIMES DAILY WITH MEALS
Qty: 180 TAB | Refills: 5 | Status: SHIPPED | OUTPATIENT
Start: 2018-01-01 | End: 2019-01-01 | Stop reason: SDUPTHER

## 2018-01-01 RX ORDER — OMEPRAZOLE 20 MG/1
20 CAPSULE, DELAYED RELEASE ORAL DAILY
Qty: 90 CAP | Refills: 3 | Status: ON HOLD | OUTPATIENT
Start: 2018-01-01 | End: 2019-01-01 | Stop reason: SDUPTHER

## 2018-01-01 RX ORDER — GLIPIZIDE 5 MG/1
TABLET, FILM COATED, EXTENDED RELEASE ORAL
Qty: 90 TAB | Refills: 5 | Status: SHIPPED | OUTPATIENT
Start: 2018-01-01 | End: 2018-01-01 | Stop reason: SDUPTHER

## 2018-01-01 RX ORDER — AMMONIUM LACTATE 12 G/100G
CREAM TOPICAL 2 TIMES DAILY
Qty: 140 G | Refills: 5 | Status: SHIPPED | OUTPATIENT
Start: 2018-01-01 | End: 2019-01-01

## 2018-01-01 RX ORDER — ESTRADIOL 0.1 MG/G
CREAM VAGINAL
Qty: 42.5 G | Refills: 10 | Status: SHIPPED | OUTPATIENT
Start: 2018-01-01 | End: 2019-01-01

## 2018-01-01 RX ORDER — GLIPIZIDE 5 MG/1
TABLET, FILM COATED, EXTENDED RELEASE ORAL
Qty: 270 TAB | Refills: 3 | Status: SHIPPED | OUTPATIENT
Start: 2018-01-01 | End: 2019-01-01

## 2018-01-01 RX ORDER — OMEPRAZOLE 20 MG/1
CAPSULE, DELAYED RELEASE ORAL
Qty: 30 CAP | Refills: 5 | Status: SHIPPED | OUTPATIENT
Start: 2018-01-01 | End: 2018-01-01 | Stop reason: SDUPTHER

## 2018-02-09 DIAGNOSIS — Z76.0 MEDICATION REFILL: ICD-10-CM

## 2018-02-09 RX ORDER — METOPROLOL SUCCINATE 50 MG/1
TABLET, EXTENDED RELEASE ORAL
Qty: 30 TAB | Refills: 2 | Status: SHIPPED | OUTPATIENT
Start: 2018-02-09 | End: 2018-05-08 | Stop reason: SDUPTHER

## 2018-02-15 DIAGNOSIS — Z76.0 MEDICATION REFILL: ICD-10-CM

## 2018-02-15 RX ORDER — COLESEVELAM HYDROCHLORIDE 625 MG/1
TABLET, FILM COATED ORAL
Qty: 180 TAB | Refills: 4 | Status: SHIPPED | OUTPATIENT
Start: 2018-02-15 | End: 2018-01-01 | Stop reason: SDUPTHER

## 2018-03-09 DIAGNOSIS — Z76.0 MEDICATION REFILL: ICD-10-CM

## 2018-03-09 RX ORDER — LEVOTHYROXINE SODIUM 112 UG/1
TABLET ORAL
Qty: 90 TAB | Refills: 3 | Status: SHIPPED | OUTPATIENT
Start: 2018-03-09 | End: 2018-01-01 | Stop reason: SDUPTHER

## 2018-03-23 ENCOUNTER — HOSPITAL ENCOUNTER (OUTPATIENT)
Dept: LAB | Age: 78
Discharge: HOME | End: 2018-03-23
Payer: MEDICARE

## 2018-03-23 ENCOUNTER — OFFICE VISIT (OUTPATIENT)
Dept: INTERNAL MEDICINE CLINIC | Age: 78
End: 2018-03-23

## 2018-03-23 VITALS
HEART RATE: 69 BPM | SYSTOLIC BLOOD PRESSURE: 160 MMHG | RESPIRATION RATE: 18 BRPM | TEMPERATURE: 97.9 F | DIASTOLIC BLOOD PRESSURE: 100 MMHG | BODY MASS INDEX: 34.85 KG/M2 | WEIGHT: 209.2 LBS | OXYGEN SATURATION: 95 % | HEIGHT: 65 IN

## 2018-03-23 DIAGNOSIS — E78.00 HYPERCHOLESTEROLEMIA: Chronic | ICD-10-CM

## 2018-03-23 DIAGNOSIS — I10 ESSENTIAL HYPERTENSION: Chronic | ICD-10-CM

## 2018-03-23 DIAGNOSIS — E11.3299 TYPE 2 DIABETES MELLITUS WITH MILD NONPROLIFERATIVE RETINOPATHY WITHOUT MACULAR EDEMA, WITHOUT LONG-TERM CURRENT USE OF INSULIN, UNSPECIFIED LATERALITY (HCC): Primary | Chronic | ICD-10-CM

## 2018-03-23 DIAGNOSIS — E11.3299 TYPE 2 DIABETES MELLITUS WITH MILD NONPROLIFERATIVE RETINOPATHY WITHOUT MACULAR EDEMA, WITHOUT LONG-TERM CURRENT USE OF INSULIN, UNSPECIFIED LATERALITY (HCC): Chronic | ICD-10-CM

## 2018-03-23 DIAGNOSIS — E11.319 TYPE 2 DIABETES MELLITUS WITH RETINOPATHY, WITHOUT LONG-TERM CURRENT USE OF INSULIN, MACULAR EDEMA PRESENCE UNSPECIFIED, UNSPECIFIED LATERALITY, UNSPECIFIED RETINOPATHY SEVERITY (HCC): ICD-10-CM

## 2018-03-23 DIAGNOSIS — E07.9 THYROID DISEASE: Chronic | ICD-10-CM

## 2018-03-23 PROBLEM — E11.21 TYPE 2 DIABETES WITH NEPHROPATHY (HCC): Status: ACTIVE | Noted: 2018-03-23

## 2018-03-23 LAB
ALBUMIN SERPL-MCNC: 3.7 G/DL (ref 3.4–5)
ALBUMIN/GLOB SERPL: 1 {RATIO} (ref 0.8–1.7)
ALP SERPL-CCNC: 124 U/L (ref 45–117)
ALT SERPL-CCNC: 19 U/L (ref 13–56)
ANION GAP SERPL CALC-SCNC: 6 MMOL/L (ref 3–18)
AST SERPL-CCNC: 23 U/L (ref 15–37)
BILIRUB SERPL-MCNC: 0.4 MG/DL (ref 0.2–1)
BUN SERPL-MCNC: 22 MG/DL (ref 7–18)
BUN/CREAT SERPL: 25 (ref 12–20)
CALCIUM SERPL-MCNC: 9.1 MG/DL (ref 8.5–10.1)
CHLORIDE SERPL-SCNC: 99 MMOL/L (ref 100–108)
CHOLEST SERPL-MCNC: 133 MG/DL
CO2 SERPL-SCNC: 26 MMOL/L (ref 21–32)
CREAT SERPL-MCNC: 0.87 MG/DL (ref 0.6–1.3)
GLOBULIN SER CALC-MCNC: 3.8 G/DL (ref 2–4)
GLUCOSE SERPL-MCNC: 109 MG/DL (ref 74–99)
HBA1C MFR BLD: 7.6 % (ref 4.2–5.6)
HDLC SERPL-MCNC: 52 MG/DL (ref 40–60)
HDLC SERPL: 2.6 {RATIO} (ref 0–5)
LDLC SERPL CALC-MCNC: 62.2 MG/DL (ref 0–100)
LIPID PROFILE,FLP: NORMAL
POTASSIUM SERPL-SCNC: 4.7 MMOL/L (ref 3.5–5.5)
PROT SERPL-MCNC: 7.5 G/DL (ref 6.4–8.2)
SODIUM SERPL-SCNC: 131 MMOL/L (ref 136–145)
T4 FREE SERPL-MCNC: 1.4 NG/DL (ref 0.7–1.5)
TRIGL SERPL-MCNC: 94 MG/DL (ref ?–150)
TSH SERPL DL<=0.05 MIU/L-ACNC: 5.43 UIU/ML (ref 0.36–3.74)
VLDLC SERPL CALC-MCNC: 18.8 MG/DL

## 2018-03-23 PROCEDURE — 36415 COLL VENOUS BLD VENIPUNCTURE: CPT | Performed by: INTERNAL MEDICINE

## 2018-03-23 PROCEDURE — 84439 ASSAY OF FREE THYROXINE: CPT | Performed by: INTERNAL MEDICINE

## 2018-03-23 PROCEDURE — 83036 HEMOGLOBIN GLYCOSYLATED A1C: CPT | Performed by: INTERNAL MEDICINE

## 2018-03-23 PROCEDURE — 80061 LIPID PANEL: CPT | Performed by: INTERNAL MEDICINE

## 2018-03-23 PROCEDURE — 80053 COMPREHEN METABOLIC PANEL: CPT | Performed by: INTERNAL MEDICINE

## 2018-03-23 RX ORDER — DOXYCYCLINE 40 MG/1
CAPSULE ORAL DAILY
COMMUNITY
Start: 2018-02-13 | End: 2019-01-01

## 2018-03-23 NOTE — MR AVS SNAPSHOT
[facility] 
 
 
 [address] [facility] [id number] 
[phone] Patient: Radha Jansen MRN: [id number] BNL:[id number] Visit Information Date & Time Provider Department Dept. Phone Encounter #  
 3/23/2018 11:45 AM Donya Giordano MD [address] [place] [address] [phone] [id number] Follow-up Instructions Return in about 6 months (around 9/23/2018) for Medicare Wellness Visit, HTN, DM, cholesterol. Upcoming Health Maintenance Date Due HEMOGLOBIN A1C Q6M 2/4/2018 FOOT EXAM Q1 2/23/2018 EYE EXAM RETINAL OR DILATED Q1 7/19/2018 MICROALBUMIN Q1 8/4/2018 LIPID PANEL Q1 8/4/2018 MEDICARE YEARLY EXAM 8/5/2018 GLAUCOMA SCREENING Q2Y 7/19/2019 DTaP/Tdap/Td series (2 - Td) 6/17/2026 Allergies as of 3/23/2018  Review Complete On: 3/23/2018 By: Donya Giordano MD  
  
 Severity Noted Reaction Type Reactions Latex High 11/06/2012   Topical Rash Other Plant, Animal, Environmental Medium 08/14/2014   Intolerance Other (comments) Surgical Steel, Related to ankle fracture and it caused bursitis and was removed. ELASTIC Acetaminophen  11/15/2011    Other (comments) Abdominal pain Adhesive  11/06/2013    Hives Pt also states she has burning and stinging Morphine  12/03/2013    Other (comments) Agitated, nasty Statins-hmg-coa Reductase Inhibitors  11/16/2011    Other (comments) Leg cramps Sulfa (Sulfonamide Antibiotics)  11/16/2011    Rash Tramadol  11/16/2011    Nausea Only Current Immunizations  Never Reviewed Name Date Influenza High Dose Vaccine PF 10/6/2016 Influenza Vaccine 10/17/2012 12:00 AM  
 Pneumococcal Conjugate (PCV-13) 2/23/2017 12:15 PM  
 Pneumococcal Polysaccharide (PPSV-23) 10/11/2011 12:00 AM  
 Tdap 6/17/2016 10:55 AM  
 ZZZ-RETIRED (DO NOT USE) Pneumococcal Vaccine (Unspecified Type) 10/1/2010 Zoster 1/1/2008 Not reviewed this visit You Were Diagnosed With   
  
 Codes Comments Type 2 diabetes mellitus with mild nonproliferative retinopathy without macular edema, without long-term current use of insulin, unspecified laterality ([facility]    -  Primary ICD-10-CM: Z14.7874 ICD-9-CM: 250.50, 362.04 Hypercholesterolemia     ICD-10-CM: E78.00 ICD-9-CM: 272.0 Thyroid disease     ICD-10-CM: E07.9 ICD-9-CM: 246.9 Essential hypertension     ICD-10-CM: I10 
ICD-9-CM: 401.9 Vitals BP Pulse Temp Resp Height(growth percentile) Weight(growth percentile) (!) 160/100 (BP 1 Location: Right arm, BP Patient Position: Sitting) 69 97.9 °F (36.6 °C) (Oral) 18 5' 5\" (1.651 m) 209 lb 3.2 oz (94.9 kg) SpO2 BMI OB Status Smoking Status 95% 34.81 kg/m2 Postmenopausal Never Smoker Vitals History BMI and BSA Data Body Mass Index Body Surface Area 34.81 kg/m 2 2.09 m 2 Preferred Pharmacy Pharmacy Name Phone [facility] [address] [place] [address], [address] [place] [address] [address] [phone] Your Updated Medication List  
  
   
This list is accurate as of 3/23/18 12:22 PM.  Always use your most recent med list. ADVIL -38 mg Tab Generic drug:  Ibuprofen-diphenhydrAMINE Take 2 Tabs by mouth nightly. ALPHAGAN P 0.1 % ophthalmic solution Generic drug:  brimonidine  
every eight (8) hours. ammonium lactate 12 % topical cream  
Commonly known as:  AMLACTIN Apply  to affected area two (2) times a day. rub in to affected area well * aspirin delayed-release 81 mg tablet Take  by mouth daily. * MARYLOU ASPIRIN PO Take  by mouth daily. Indications: 1-2 tabs daily  
  
 estradiol 0.01 % (0.1 mg/gram) vaginal cream  
Commonly known as:  ESTRACE Insert 2 g into vagina daily. etodolac 400 mg tablet Commonly known as:  LODINE  
TAKE ONE TABLET BY MOUTH DAILY (GENERIC LODINE) glipiZIDE SR 5 mg CR tablet Commonly known as:  GLUCOTROL XL  
 TAKE TWO TABLETS BY MOUTH EVERY MORNING AND TAKE ONE TABLET BY MOUTH EVERY EVENING WITH MEALS  
  
 naproxen-diphenhydramine 220-25 mg Tab Take  by mouth. omeprazole 20 mg capsule Commonly known as:  PRILOSEC  
TAKE ONE CAPSULE BY MOUTH DAILY ORACEA 40 mg capsule Generic drug:  doxycycline monohydrate  
daily. SYNTHROID 112 mcg tablet Generic drug:  levothyroxine TAKE ONE TABLET BY MOUTH DAILY BEFORE BREAKFAST FOR HYPOTHYROIDISM  
  
 TOPROL XL 50 mg XL tablet Generic drug:  metoprolol succinate TAKE ONE TABLET BY MOUTH DAILY WELCHOL 625 mg tablet Generic drug:  colesevelam  
TAKE THREE TABLETS BY MOUTH TWO TIMES A DAY * Notice: This list has 2 medication(s) that are the same as other medications prescribed for you. Read the directions carefully, and ask your doctor or other care provider to review them with you. We Performed the Following  DIABETES FOOT EXAM [[facility]] Follow-up Instructions Return in about 6 months (around 9/23/2018) for Medicare Wellness Visit, HTN, DM, cholesterol. To-Do List   
 03/23/2018 Lab:  HEMOGLOBIN A1C W/O EAG   
  
 03/23/2018 Lab:  LIPID PANEL   
  
 03/23/2018 Lab:  METABOLIC PANEL, COMPREHENSIVE   
  
 03/23/2018 Lab:  TSH AND FREE T4 Introducing [facility]! [employer] introduces [facility] patient portal. Now you can access parts of your medical record, email your doctor's office, and request medication refills online. 1. In your internet browser, go to [email or web address] [email or web address]/Watch Over Me 2. Click on the First Time User? Click Here link in the Sign In box. You will see the New Member Sign Up page. 3. Enter your [facility] Access Code exactly as it appears below. You will not need to use this code after youve completed the sign-up process. If you do not sign up before the expiration date, you must request a new code.  
 
· [facility] Access Code: [email or web address] 
 Expires: 6/21/2018 12:22 PM 
 
4. Enter the last four digits of your Social Security Number (xxxx) and Date of Birth (mm/dd/yyyy) as indicated and click Submit. You will be taken to the next sign-up page. 5. Create a [employer] ID. This will be your [employer] login ID and cannot be changed, so think of one that is secure and easy to remember. 6. Create a [employer] password. You can change your password at any time. 7. Enter your Password Reset Question and Answer. This can be used at a later time if you forget your password. 8. Enter your e-mail address. You will receive e-mail notification when new information is available in [address] 9. Click Sign Up. You can now view and download portions of your medical record. 10. Click the Download Summary menu link to download a portable copy of your medical information. If you have questions, please visit the Frequently Asked Questions section of the [employer] website. Remember, [employer] is NOT to be used for urgent needs. For medical emergencies, dial 911. Now available from your iPhone and Android! Please provide this summary of care documentation to your next provider. Your primary care clinician is listed as [facility]. If you have any questions after today's visit, please call [phone].

## 2018-03-23 NOTE — ASSESSMENT & PLAN NOTE
Stable, based on history, physical exam and review of pertinent labs, studies and medications; meds reconciled; continue current treatment plan, lifestyle modifications recommended.   Key Antihyperglycemic Medications             glipiZIDE SR (GLUCOTROL XL) 5 mg CR tablet  (Taking) TAKE TWO TABLETS BY MOUTH EVERY MORNING AND TAKE ONE TABLET BY MOUTH EVERY EVENING WITH MEALS        Other Key Diabetic Medications             WELCHOL 625 mg tablet  (Taking) TAKE THREE TABLETS BY MOUTH TWO TIMES A DAY        Lab Results   Component Value Date/Time    Hemoglobin A1c 6.2 08/04/2017 01:48 PM    Glucose 105 08/04/2017 01:48 PM    Creatinine 0.81 08/04/2017 01:48 PM    Microalbumin/Creat ratio (mg/g creat) 453 08/04/2017 01:48 PM    Microalbumin,urine random 19.50 08/04/2017 01:48 PM    Cholesterol, total 177 08/04/2017 01:48 PM    HDL Cholesterol 62 08/04/2017 01:48 PM    LDL, calculated 82.4 08/04/2017 01:48 PM    Triglyceride 163 08/04/2017 01:48 PM     Diabetic Foot and Eye Exam HM Status   Topic Date Due    Diabetic Foot Care  02/23/2018    Eye Exam  07/19/2018

## 2018-03-23 NOTE — PROGRESS NOTES
ROOM # 5    Judi Camp presents today for   Chief Complaint   Patient presents with    Hypertension     6 month f/u    Diabetes     6 month f/u    Cholesterol Problem     6 month f/u    Medication Evaluation       Judi Camp preferred language for health care discussion is english/other. Is someone accompanying this pt? no    Is the patient using any DME equipment during Harpreet Bough?  Yes, seated    Depression Screening:  PHQ over the last two weeks 3/23/2018 8/4/2017 2/23/2017 6/17/2016 2/26/2016 3/11/2015 4/14/2014   PHQ Not Done - - Active Diagnosis of Depression or Bipolar Disorder - - - -   Little interest or pleasure in doing things Not at all Several days - Not at all Not at all Several days Nearly every day   Feeling down, depressed or hopeless Not at all Several days - Not at all Not at all Not at all Nearly every day   Total Score PHQ 2 0 2 - 0 0 1 6   Trouble falling or staying asleep, or sleeping too much - - - - - - Not at all   Feeling tired or having little energy - - - - - - Not at all   Poor appetite or overeating - - - - - - Not at all   Feeling bad about yourself - or that you are a failure or have let yourself or your family down - - - - - - Not at all   Trouble concentrating on things such as school, work, reading or watching TV - - - - - - Not at all   Moving or speaking so slowly that other people could have noticed; or the opposite being so fidgety that others notice - - - - - - Not at all   Thoughts of being better off dead, or hurting yourself in some way - - - - - - Not at all   How difficult have these problems made it for you to do your work, take care of your home and get along with others - - - - - - Somewhat difficult       Learning Assessment:  Learning Assessment 11/6/2013   PRIMARY LEARNER Patient   PRIMARY LANGUAGE ENGLISH   LEARNER PREFERENCE PRIMARY READING     DEMONSTRATION     VIDEOS     PICTURES     LISTENING   ANSWERED BY patient   RELATIONSHIP SELF       Abuse Screening:  Abuse Screening Questionnaire 8/4/2017   Do you ever feel afraid of your partner? N   Are you in a relationship with someone who physically or mentally threatens you? N   Is it safe for you to go home? Y       Fall Risk  Fall Risk Assessment, last 12 mths 3/23/2018 8/4/2017 2/23/2017 6/17/2016 2/26/2016 3/11/2015 4/14/2014   Able to walk? Yes Yes Yes Yes Yes Yes Yes   Fall in past 12 months? No No No No No Yes No   Fall with injury? - - - - - Yes -   Number of falls in past 12 months - - - - - 5 -   Fall Risk Score - - - - - 6 -       Health Maintenance reviewed and discussed per provider. Yes    Areta Lot is due for influenza vax, A1C, foot exam.  Please order/place referral if appropriate. Advance Directive:  1. Do you have an advance directive in place? Patient Reply: on file    2. If not, would you like material regarding how to put one in place? Patient Reply: no    Coordination of Care:  1. Have you been to the ER, urgent care clinic since your last visit? Hospitalized since your last visit? no    2. Have you seen or consulted any other health care providers outside of [facility] since your last visit? Include any pap smears or colon screening.  no

## 2018-03-23 NOTE — PROGRESS NOTES
HISTORY OF PRESENT ILLNESS  Ramon Rojas is a 76 y.o. female. Visit Vitals    BP (!) 160/100 (BP 1 Location: Right arm, BP Patient Position: Sitting)  Comment: manual    Pulse 69    Temp 97.9 °F (36.6 °C) (Oral)    Resp 18    Ht 5' 5\" (1.651 m)    Wt 209 lb 3.2 oz (94.9 kg)    SpO2 95%    BMI 34.81 kg/m2       HPI Comments: Pt does not check her blood sugars    Her BP runs high. Weight is up 15 #    She is angry about the  who asked her about Medicaid. She says she had medicaid bundled into her Medicare and pension. She has had bleeding in her left eye. Saw Dr. Nevaeh Lama recently and had laser therapy. Hypertension    The history is provided by the patient. This is a chronic problem. The current episode started more than 1 week ago. The problem has not changed since onset. Pertinent negatives include no chest pain, no palpitations, no headaches, no dizziness and no shortness of breath. There are no associated agents to hypertension. Risk factors include postmenopause and obesity. Diabetes   The history is provided by the patient. This is a chronic problem. The current episode started more than 1 week ago. The problem occurs daily. The problem has not changed since onset. Pertinent negatives include no chest pain, no headaches and no shortness of breath. Exacerbated by: diet. The symptoms are relieved by medications (diet). Cholesterol Problem   The history is provided by the patient. This is a chronic problem. The current episode started more than 1 week ago. The problem occurs daily. The problem has not changed since onset. Pertinent negatives include no chest pain, no headaches and no shortness of breath. Exacerbated by: diet. The symptoms are relieved by medications (diet). Medication Evaluation   The history is provided by the patient. This is a new problem. Pertinent negatives include no chest pain, no headaches and no shortness of breath.        Review of Systems   Constitutional: Negative for chills and fever. Eyes:        Bleeding in her left eye     Respiratory: Negative for shortness of breath. Cardiovascular: Negative for chest pain and palpitations. Genitourinary: Negative for frequency and urgency. Neurological: Negative for dizziness and headaches. Endo/Heme/Allergies: Negative for polydipsia. Physical Exam   Constitutional: She is oriented to person, place, and time. She appears well-developed and well-nourished. No distress. Cardiovascular: Normal rate and regular rhythm. Pulmonary/Chest: Effort normal and breath sounds normal.   Musculoskeletal: She exhibits edema. Neurological: She is alert and oriented to person, place, and time. Diabetic foot exam:     Left: Reflexes absent     Vibratory sensation diminished    Proprioception normal   Sharp/dull discrimination     Filament test normal sensation with micro filament   Pulse DP: trace   Pulse PT:    Deformities: Mild - edema, prior bunion surgery, Callous under 5th metatarsal    Right: Reflexes absent   Vibratory sensation diminished   Proprioception normal   Sharp/dull discrimination    Filament test normal sensation with micro filament   Pulse DP: trace   Pulse PT:    Deformities: Mild - edema. Skin: Skin is warm and dry. She is not diaphoretic. Psychiatric: She has a normal mood and affect. Nursing note and vitals reviewed. ASSESSMENT and PLAN    ICD-10-CM ICD-9-CM    1. Type 2 diabetes mellitus with mild nonproliferative retinopathy without macular edema, without long-term current use of insulin, unspecified laterality ([facility] [email or web address] [facility] K51.1272 210.41 METABOLIC PANEL, COMPREHENSIVE     362.04 HEMOGLOBIN A1C W/O EAG       DIABETES FOOT EXAM   2. Hypercholesterolemia E78.00 272.0 LIPID PANEL   3. hypothyroid E07.9 246.9 TSH AND FREE T4   4. Essential hypertension M92 941.8 METABOLIC PANEL, COMPREHENSIVE       BP up--but there are major problems with compliance.  She has gained weight and does not eat well  There is a lot of anxiety as well. Update lab. BS has been good most of the time. F/u 6 months--pt does not want to come any more often! Will watch her BP    Discussed BMI/weight, lifestyle, diet and exercise. Discussed effect on blood pressure, blood sugar, and joints especially  Focus on limiting white carbs, portion control, and moving more.

## 2018-09-20 PROBLEM — E11.21 TYPE 2 DIABETES WITH NEPHROPATHY (HCC): Chronic | Status: ACTIVE | Noted: 2018-03-23

## 2018-09-20 NOTE — PROGRESS NOTES
HISTORY OF PRESENT ILLNESS Agustina Maldonado is a [de-identified] y.o. female. Visit Vitals  BP (!) 180/100  Pulse 75  Temp 96.8 °F (36 °C) (Oral)  Resp 24  
 Ht 5' 5\" (1.651 m)  Wt 207 lb 3.2 oz (94 kg)  SpO2 98%  BMI 34.48 kg/m2 HPI Comments:  
Turned [de-identified] in July. Had a large family gathering with her kids and grandkids and a new great granddaughter. States she feels well today Current Outpatient Prescriptions: 
colesevelam (WELCHOL) 625 mg tablet, TAKE THREE TABLETS BY MOUTH TWO TIMES A DAY 
metoprolol succinate (TOPROL-XL) 50 mg XL tablet, TAKE ONE TABLET BY MOUTH DAILY 
omeprazole (PRILOSEC) 20 mg capsule, TAKE ONE CAPSULE BY MOUTH DAILY ORACEA 40 mg capsule, daily. MARYLOU ASPIRIN PO, Take  by mouth daily. Indications: 1-2 tabs daily Ibuprofen-diphenhydrAMINE (ADVIL PM) 200-38 mg tab, Take 2 Tabs by mouth nightly. SYNTHROID 112 mcg tablet, TAKE ONE TABLET BY MOUTH DAILY BEFORE BREAKFAST FOR HYPOTHYROIDISM 
glipiZIDE SR (GLUCOTROL XL) 5 mg CR tablet, TAKE TWO TABLETS BY MOUTH EVERY MORNING AND TAKE ONE TABLET BY MOUTH EVERY EVENING WITH MEALS 
etodolac (LODINE) 400 mg tablet, TAKE ONE TABLET BY MOUTH DAILY (GENERIC LODINE) 
ammonium lactate (AMLACTIN) 12 % topical cream, Apply  to affected area two (2) times a day. rub in to affected area well 
naproxen-diphenhydramine 220-25 mg tab, Take  by mouth. ESTRACE 0.01 % (0.1 mg/gram) vaginal cream, INSERT VAGINALLY 2 GRAMS DAILY 
aspirin delayed-release 81 mg tablet, Take  by mouth daily. brimonidine (ALPHAGAN P) 0.1 % ophthalmic solution, every eight (8) hours. No current facility-administered medications for this visit. Hypertension The history is provided by the patient. This is a chronic problem. The current episode started more than 1 week ago. The problem has not changed since onset. Pertinent negatives include no chest pain, no palpitations, no blurred vision, no headaches, no dizziness and no shortness of breath. Risk factors include obesity and postmenopause. Diabetes The history is provided by the patient. This is a chronic problem. The current episode started more than 1 week ago. The problem occurs daily. The problem has not changed since onset. Pertinent negatives include no chest pain, no headaches and no shortness of breath. Exacerbated by: diet. The symptoms are relieved by medications (diet). Cholesterol Problem The history is provided by the patient. This is a chronic problem. The current episode started more than 1 week ago. The problem occurs daily. The problem has not changed since onset. Pertinent negatives include no chest pain, no headaches and no shortness of breath. Exacerbated by: diet. The symptoms are relieved by medications (diet). Review of Systems Constitutional: Negative for chills and fever. Eyes: Negative for blurred vision. Respiratory: Negative for shortness of breath. Cardiovascular: Negative for chest pain and palpitations. Genitourinary: Negative for frequency and urgency. Nocturia once time Neurological: Negative for dizziness and headaches. Endo/Heme/Allergies: Negative for polydipsia. Physical Exam  
Constitutional: She is oriented to person, place, and time. She appears well-developed and well-nourished. No distress. Cardiovascular: Normal rate and regular rhythm. Pulmonary/Chest: Effort normal and breath sounds normal.  
Musculoskeletal: She exhibits no edema. Neurological: She is alert and oriented to person, place, and time. Skin: Skin is warm and dry. She is not diaphoretic. Psychiatric: She has a normal mood and affect. Nursing note and vitals reviewed. ASSESSMENT and PLAN 
  ICD-10-CM ICD-9-CM 3. Type 2 diabetes with nephropathy (HCC) A04.14 450.98 METABOLIC PANEL, COMPREHENSIVE  
  583.81 HEMOGLOBIN A1C W/O EAG  
 MICROALBUMIN, UR, RAND W/ MICROALB/CREAT RATIO 4. Hypercholesterolemia E78.00 272.0 LIPID PANEL 5. hypothyroid E07.9 246.9 TSH AND FREE T4  
6. Essential hypertension U13 258.0 METABOLIC PANEL, COMPREHENSIVE 7. Medication refill Z76.0 V68.1 colesevelam (WELCHOL) 625 mg tablet  
   metoprolol succinate (TOPROL-XL) 50 mg XL tablet  
   levothyroxine (SYNTHROID) 112 mcg tablet BP very high today but coming down with rest. She always runs high here DM--has had some retinopathy and is undergoing laser treatments. She sees Dr. Joelle Jaimes or Lexy Blackman monthly Discussed BMI/weight, lifestyle, diet and exercise. Discussed effect on blood pressure, blood sugar, and joints especially Focus on limiting white carbs, portion control, and moving more. update lab Refill meds F/u 6 months for HTN, DM, chol, thyroid The following is a separate encounter visit: This is the Subsequent Medicare Annual Wellness Exam, performed 12 months or more after the Initial AWV or the last Subsequent AWV I have reviewed the patient's medical history in detail and updated the computerized patient record. History Past Medical History:  
Diagnosis Date  AC joint arthropathy   
 right  Acne rosacea  Bursitis of shoulder   
 rotator cuff  Burst blood vessel in eye, left 02/2018  
 ruptured blood vessel left  Diabetes ([address]  Diabetic retinopathy ([address] severe, non-proliferative  Discoid lupus  Duodenal ulcer  Facet joint disease ([address] cervical spine, injections  Fibromyalgia  Hiatal hernia  Hypercholesterolemia  Hypertension  hypothyroid  Impingement syndrome of shoulder   
 left  Lumbar scoliosis  Nonexudative age-related macular degeneration   
 severe  Open-angle glaucoma   
 severe  Rectocele Past Surgical History:  
Procedure Laterality Date  COLONOSCOPY  2007  
 polyps, Dr. James Villavicencio  HC ADMIN HEPATITIS B VACC  unk  HX CARPAL TUNNEL RELEASE  HX CHOLECYSTECTOMY  2007  HX KNEE ARTHROSCOPY    
 HX SHOULDER ARTHROSCOPY    
 right Current Outpatient Prescriptions Medication Sig Dispense Refill  colesevelam (WELCHOL) 625 mg tablet Take 3 Tabs by mouth two (2) times daily (with meals). Indications: heterozygous familial hypercholesterolemia 180 Tab 5  
 metoprolol succinate (TOPROL-XL) 50 mg XL tablet Take 1 Tab by mouth daily. 90 Tab 5  levothyroxine (SYNTHROID) 112 mcg tablet Take 1 Tab by mouth Daily (before breakfast). 90 Tab 5  
 ammonium lactate (AMLACTIN) 12 % topical cream Apply  to affected area two (2) times a day. rub in to affected area well 140 g 5  
 omeprazole (PRILOSEC) 20 mg capsule TAKE ONE CAPSULE BY MOUTH DAILY 30 Cap 11  
 ORACEA 40 mg capsule daily.  MARYLOU ASPIRIN PO Take  by mouth daily. Indications: 1-2 tabs daily  Ibuprofen-diphenhydrAMINE (ADVIL PM) 200-38 mg tab Take 2 Tabs by mouth nightly.  glipiZIDE SR (GLUCOTROL XL) 5 mg CR tablet TAKE TWO TABLETS BY MOUTH EVERY MORNING AND TAKE ONE TABLET BY MOUTH EVERY EVENING WITH MEALS 90 Tab 11  
 etodolac (LODINE) 400 mg tablet TAKE ONE TABLET BY MOUTH DAILY (GENERIC LODINE) 30 Tab 4  
 naproxen-diphenhydramine 220-25 mg tab Take  by mouth.  ESTRACE 0.01 % (0.1 mg/gram) vaginal cream INSERT VAGINALLY 2 GRAMS DAILY 42.5 g 10 Allergies Allergen Reactions  Latex Rash  Other Plant, Animal, Environmental Other (comments) Surgical Steel, Related to ankle fracture and it caused bursitis and was removed. ELASTIC  Acetaminophen Other (comments) Abdominal pain  Adhesive Hives Pt also states she has burning and stinging  Morphine Other (comments) Agitated, nasty  Statins-Hmg-Coa Reductase Inhibitors Other (comments) Leg cramps  Sulfa (Sulfonamide Antibiotics) Rash  Tramadol Nausea Only Family History Problem Relation Age of Onset  No Known Problems Mother  No Known Problems Father Social History Substance Use Topics  Smoking status: Never Smoker  Smokeless tobacco: Never Used  Alcohol use Yes Comment: Rarely Patient Active Problem List  
Diagnosis Code  Edema R60.9  Cough R05  Hypercholesterolemia E78.00  Fibromyalgia M79.7  hypothyroid E07.9  Dermatitis L30.9  Essential hypertension I10  Type 2 diabetes mellitus with mild nonproliferative diabetic retinopathy without macular edema ([facility] [address] B45.5796  Type 2 diabetes with nephropathy (HCC) E11.21 Depression Risk Factor Screening: PHQ over the last two weeks 9/20/2018 PHQ Not Done - Little interest or pleasure in doing things Not at all Feeling down, depressed, irritable, or hopeless Not at all Total Score PHQ 2 0 Trouble falling or staying asleep, or sleeping too much - Feeling tired or having little energy - Poor appetite, weight loss, or overeating - Feeling bad about yourself - or that you are a failure or have let yourself or your family down - Trouble concentrating on things such as school, work, reading, or watching TV - Moving or speaking so slowly that other people could have noticed; or the opposite being so fidgety that others notice - Thoughts of being better off dead, or hurting yourself in some way - How difficult have these problems made it for you to do your work, take care of your home and get along with others - Alcohol Risk Factor Screening: On any occasion in the past three months you have had more than 3 drinks containing alcohol. Functional Ability and Level of Safety:  
Hearing Loss Hearing is good. Activities of Daily Living The home contains: grab bars . Ramp on back house, handicap door handles Patient needs help with:  transportation and walking Fall Risk Fall Risk Assessment, last 12 mths 9/20/2018 Able to walk? Yes Fall in past 12 months?  No  
 Fall with injury? -  
Number of falls in past 12 months - Fall Risk Score -  
 
 
Abuse Screen Patient is not abused Cognitive Screening Evaluation of Cognitive Function: 
Has your family/caregiver stated any concerns about your memory: no 
Normal, Mini Cog test 
 
Patient Care Team  
Patient Care Team: 
Nola Morales MD as PCP - General (Internal Medicine) Cristina Rangel MD as Consulting Provider (Ophthalmology) Geovani Jaramillo MD as Consulting Provider (Anesthesiology) Delilah Simmons MD as Consulting Provider (Dermatology) Ambrose Finley DPM as Consulting Provider (Podiatry) Amarilys Reyes MD (Ophthalmology) Assessment/Plan Education and counseling provided: 
Are appropriate based on today's review and evaluation Diagnoses and all orders for this visit: 
 
1. Medicare annual wellness visit, subsequent 2. Encounter for immunization -     Influenza Admin () -     Influenza Vaccine Inactivated (IIV)(FLUAD), Subunit, Adjuvanted, IM ([id number]) Health Maintenance Due Topic Date Due  Influenza Age 5 to Adult  08/01/2018  MICROALBUMIN Q1  08/04/2018  MEDICARE YEARLY EXAM  08/05/2018  HEMOGLOBIN A1C Q6M  09/23/2018

## 2018-09-20 NOTE — MR AVS SNAPSHOT
Balbina Velazquez 
 
 
 [address] [facility] [address] [address] 
[phone] Patient: Andrew Todd MRN: [id number] NEM:[id number] Visit Information Date & Time Provider Department Dept. Phone Encounter #  
 9/20/2018  3:30 PM Sunita Sullivan, [facility] [phone] [id number] Follow-up Instructions Return in about 6 months (around 3/20/2019) for HTN, DM, cholesterol, thryoid disorder. Upcoming Health Maintenance Date Due Influenza Age 5 to Adult 8/1/2018 MICROALBUMIN Q1 8/4/2018 MEDICARE YEARLY EXAM 8/5/2018 HEMOGLOBIN A1C Q6M 9/23/2018 EYE EXAM RETINAL OR DILATED Q1 2/14/2019 FOOT EXAM Q1 3/23/2019 LIPID PANEL Q1 3/23/2019 GLAUCOMA SCREENING Q2Y 2/14/2020 DTaP/Tdap/Td series (2 - Td) 6/17/2026 Allergies as of 9/20/2018  Review Complete On: 9/20/2018 By: Sunita Sullivan MD  
  
 Severity Noted Reaction Type Reactions Latex High 11/06/2012   Topical Rash Other Plant, Animal, Environmental Medium 08/14/2014   Intolerance Other (comments) Surgical Steel, Related to ankle fracture and it caused bursitis and was removed. ELASTIC Acetaminophen  11/15/2011    Other (comments) Abdominal pain Adhesive  11/06/2013    Hives Pt also states she has burning and stinging Morphine  12/03/2013    Other (comments) Agitated, nasty Statins-hmg-coa Reductase Inhibitors  11/16/2011    Other (comments) Leg cramps Sulfa (Sulfonamide Antibiotics)  11/16/2011    Rash Tramadol  11/16/2011    Nausea Only Current Immunizations  Never Reviewed Name Date Influenza High Dose Vaccine PF 10/6/2016 Influenza Vaccine 10/17/2012 12:00 AM  
 Influenza Vaccine (Tri) Adjuvanted  Incomplete  Pneumococcal Conjugate (PCV-13) 2/23/2017 12:15 PM  
 Pneumococcal Polysaccharide (PPSV-23) 10/11/2011 12:00 AM  
 Tdap 6/17/2016 10:55 AM  
 ZZZ-RETIRED (DO NOT USE) Pneumococcal Vaccine (Unspecified Type) 10/1/2010 Zoster 1/1/2008 Not reviewed this visit You Were Diagnosed With   
  
 Codes Comments Medicare annual wellness visit, subsequent    -  Primary ICD-10-CM: Z00.00 ICD-9-CM: V70.0 Encounter for immunization     ICD-10-CM: M87 ICD-9-CM: V03.89 Type 2 diabetes with nephropathy (HCC)     ICD-10-CM: E11.21 
ICD-9-CM: 250.40, 583.81 Hypercholesterolemia     ICD-10-CM: E78.00 ICD-9-CM: 272.0 Thyroid disease     ICD-10-CM: E07.9 ICD-9-CM: 246.9 Essential hypertension     ICD-10-CM: I10 
ICD-9-CM: 401.9 Medication refill     ICD-10-CM: Z76.0 ICD-9-CM: V68.1 Vitals BP Pulse Temp Resp Height(growth percentile) Weight(growth percentile) (!) 180/100 75 96.8 °F (36 °C) (Oral) 24 5' 5\" (1.651 m) 207 lb 3.2 oz (94 kg) SpO2 BMI OB Status Smoking Status 98% 34.48 kg/m2 Postmenopausal Never Smoker Vitals History BMI and BSA Data Body Mass Index Body Surface Area 34.48 kg/m 2 2.08 m 2 Preferred Pharmacy Pharmacy Name Phone [facility] [address] [place] [address], [address] [place] [address] [address] [phone] Your Updated Medication List  
  
   
This list is accurate as of 9/20/18  4:24 PM.  Always use your most recent med list. ADVIL -38 mg Tab Generic drug:  Ibuprofen-diphenhydrAMINE Take 2 Tabs by mouth nightly. ammonium lactate 12 % topical cream  
Commonly known as:  AMLACTIN Apply  to affected area two (2) times a day. rub in to affected area well MARYLOU ASPIRIN PO Take  by mouth daily. Indications: 1-2 tabs daily  
  
 colesevelam 625 mg tablet Commonly known as:  [facility] Take 3 Tabs by mouth two (2) times daily (with meals). Indications: heterozygous familial hypercholesterolemia ESTRACE 0.01 % (0.1 mg/gram) vaginal cream  
Generic drug:  estradiol INSERT VAGINALLY 2 GRAMS DAILY etodolac 400 mg tablet Commonly known as:  LODINE  
TAKE ONE TABLET BY MOUTH DAILY (GENERIC LODINE) glipiZIDE SR 5 mg CR tablet Commonly known as:  GLUCOTROL XL  
TAKE TWO TABLETS BY MOUTH EVERY MORNING AND TAKE ONE TABLET BY MOUTH EVERY EVENING WITH MEALS  
  
 levothyroxine 112 mcg tablet Commonly known as:  SYNTHROID Take 1 Tab by mouth Daily (before breakfast). metoprolol succinate 50 mg XL tablet Commonly known as:  TOPROL-XL Take 1 Tab by mouth daily. naproxen-diphenhydramine 220-25 mg Tab Take  by mouth. omeprazole 20 mg capsule Commonly known as:  PRILOSEC  
TAKE ONE CAPSULE BY MOUTH DAILY ORACEA 40 mg capsule Generic drug:  doxycycline monohydrate  
daily. Prescriptions Sent to Pharmacy Refills  
 colesevelam (WELCHOL) 625 mg tablet 5 Sig: Take 3 Tabs by mouth two (2) times daily (with meals). Indications: heterozygous familial hypercholesterolemia Class: Normal  
 Pharmacy: [address] Ph #: [phone] Route: Oral  
 metoprolol succinate (TOPROL-XL) 50 mg XL tablet 5 Sig: Take 1 Tab by mouth daily. Class: Normal  
 Pharmacy: [address] Ph #: [phone] Route: Oral  
 levothyroxine (SYNTHROID) 112 mcg tablet 5 Sig: Take 1 Tab by mouth Daily (before breakfast). Class: Normal  
 Pharmacy: [address] Ph #: [phone] Route: Oral  
 ammonium lactate (AMLACTIN) 12 % topical cream 5 Sig: Apply  to affected area two (2) times a day. rub in to affected area well Class: Normal  
 Pharmacy: [address] Ph #: [phone] Route: Topical  
  
We Performed the Following ADMIN INFLUENZA VIRUS VAC [ [facility]] INFLUENZA VACCINE INACTIVATED (IIV), SUBUNIT, ADJUVANTED, IM [id number] CPT(R)] Follow-up Instructions Return in about 6 months (around 3/20/2019) for HTN, DM, cholesterol, thryoid disorder. To-Do List   
 09/20/2018 Lab:  HEMOGLOBIN A1C W/O EAG   
  
 09/20/2018 Lab:  LIPID PANEL   
  
 09/20/2018 Lab:  METABOLIC PANEL, COMPREHENSIVE   
  
 09/20/2018 Lab:  MICROALBUMIN, UR, RAND W/ MICROALB/CREAT RATIO   
  
 09/20/2018 Lab:  TSH AND FREE T4 Patient Instructions Medicare Wellness Visit, Female The best way to live healthy is to have a lifestyle where you eat a well-balanced diet, exercise regularly, limit alcohol use, and quit all forms of tobacco/nicotine, if applicable. Regular preventive services are another way to keep healthy. Preventive services (vaccines, screening tests, monitoring & exams) can help personalize your care plan, which helps you manage your own care. Screening tests can find health problems at the earliest stages, when they are easiest to treat. [facility] follows the current, evidence-based guidelines published by the [facility] Will Damian ([facility]) when recommending preventive services for our patients. Because we follow these guidelines, sometimes recommendations change over time as research supports it. (For example, mammograms used to be recommended annually. Even though Medicare will still pay for an annual mammogram, the newer guidelines recommend a mammogram every two years for women of average risk.) Of course, you and your doctor may decide to screen more often for some diseases, based on your risk and your health status. Preventive services for you include: - Medicare offers their members a free annual wellness visit, which is time for you and your primary care provider to discuss and plan for your preventive service needs.  Take advantage of this benefit every year! 
-All adults over the age of 72 should receive the recommended pneumonia vaccines. Current USPSTF guidelines recommend a series of two vaccines for the best pneumonia protection.  
-All adults should have a flu vaccine yearly and a tetanus vaccine every 10 years. All adults age 61 and older should receive a shingles vaccine once in their lifetime.   
-A bone mass density test is recommended when a woman turns 65 to screen for osteoporosis. This test is only recommended one time, as a screening. Some providers will use this same test as a disease monitoring tool if you already have osteoporosis. -All adults age 38-68 who are overweight should have a diabetes screening test once every three years.  
-Other screening tests and preventive services for persons with diabetes include: an eye exam to screen for diabetic retinopathy, a kidney function test, a foot exam, and stricter control over your cholesterol.  
-Cardiovascular screening for adults with routine risk involves an electrocardiogram (ECG) at intervals determined by your doctor.  
-Colorectal cancer screenings should be done for adults age 54-65 with no increased risk factors for colorectal cancer. There are a number of acceptable methods of screening for this type of cancer. Each test has its own benefits and drawbacks. Discuss with your doctor what is most appropriate for you during your annual wellness visit. The different tests include: colonoscopy (considered the best screening method), a fecal occult blood test, a fecal DNA test, and sigmoidoscopy. -Breast cancer screenings are recommended every other year for women of normal risk, age 54-69. 
-Cervical cancer screenings for women over age 72 are only recommended with certain risk factors.  
-All adults born between [facility] should be screened once for Hepatitis C. Here is a list of your current Health Maintenance items (your personalized list of preventive services) with a due date: 
Health Maintenance Due Topic Date Due  
 Flu Vaccine  08/01/2018  Albumin Urine Test  08/04/2018 [facility] Annual Well Visit  08/05/2018  Hemoglobin A1C    09/23/2018 Introducing [facility]! [employer] introduces [employer] patient portal. Now you can access parts of your medical record, email your doctor's office, and request medication refills online. 1. In your internet browser, go to [email or web address]"[email or web address]. [employer]/"[employer] 2. Click on the First Time User? Click Here link in the Sign In box. You will see the New Member Sign Up page. 3. Enter your [employer] Access Code exactly as it appears below. You will not need to use this code after youve completed the sign-up process. If you do not sign up before the expiration date, you must request a new code. · [employer] Access Code: [email or web address] Expires: 12/19/2018  4:24 PM 
 
4. Enter the last four digits of your Social Security Number (xxxx) and Date of Birth (mm/dd/yyyy) as indicated and click Submit. You will be taken to the next sign-up page. 5. Create a [employer] ID. This will be your [employer] login ID and cannot be changed, so think of one that is secure and easy to remember. 6. Create a [employer] password. You can change your password at any time. 7. Enter your Password Reset Question and Answer. This can be used at a later time if you forget your password. 8. Enter your e-mail address. You will receive e-mail notification when new information is available in [address] 9. Click Sign Up. You can now view and download portions of your medical record. 10. Click the Download Summary menu link to download a portable copy of your medical information. If you have questions, please visit the Frequently Asked Questions section of the [employer] website. Remember, [employer] is NOT to be used for urgent needs. For medical emergencies, dial 911. Now available from your iPhone and Android! Please provide this summary of care documentation to your next provider. Your primary care clinician is listed as [facility]. If you have any questions after today's visit, please call [phone].

## 2018-09-20 NOTE — PROGRESS NOTES
ROOM # 4 Lizy Fay presents today for Chief Complaint Patient presents with  Hypertension  Diabetes  Cholesterol Problem Lizy Fay preferred language for health care discussion is english/other. Is someone accompanying this pt? no 
 
Is the patient using any DME equipment during OV? no 
 
Depression Screening: PHQ over the last two weeks 9/20/2018 3/23/2018 8/4/2017 2/23/2017 6/17/2016 2/26/2016 3/11/2015 PHQ Not Done - - - Active Diagnosis of Depression or Bipolar Disorder - - - Little interest or pleasure in doing things Not at all Not at all Several days - Not at all Not at all Several days Feeling down, depressed, irritable, or hopeless Not at all Not at all Several days - Not at all Not at all Not at all Total Score PHQ 2 0 0 2 - 0 0 1 Trouble falling or staying asleep, or sleeping too much - - - - - - - Feeling tired or having little energy - - - - - - - Poor appetite, weight loss, or overeating - - - - - - - Feeling bad about yourself - or that you are a failure or have let yourself or your family down - - - - - - - Trouble concentrating on things such as school, work, reading, or watching TV - - - - - - - Moving or speaking so slowly that other people could have noticed; or the opposite being so fidgety that others notice - - - - - - - Thoughts of being better off dead, or hurting yourself in some way - - - - - - - How difficult have these problems made it for you to do your work, take care of your home and get along with others - - - - - - - Learning Assessment: 
Learning Assessment 11/6/2013 PRIMARY LEARNER Patient PRIMARY LANGUAGE ENGLISH  
LEARNER PREFERENCE PRIMARY READING  
  DEMONSTRATION  
  VIDEOS  
  PICTURES  
  LISTENING  
ANSWERED BY patient RELATIONSHIP SELF Abuse Screening: 
Abuse Screening Questionnaire 8/4/2017 Do you ever feel afraid of your partner?  Germaine Kovacs  
 Are you in a relationship with someone who physically or mentally threatens you? Kaikerry Harper Is it safe for you to go home? Gwendolyn Hyatt Fall Risk Fall Risk Assessment, last 12 mths 9/20/2018 3/23/2018 8/4/2017 2/23/2017 6/17/2016 2/26/2016 3/11/2015 Able to walk? Yes Yes Yes Yes Yes Yes Yes Fall in past 12 months? No No No No No No Yes Fall with injury? - - - - - - Yes Number of falls in past 12 months - - - - - - 5 Fall Risk Score - - - - - - 6 Health Maintenance reviewed and discussed per provider. Yes Kerline Garcia is due for Health Maintenance Due Topic Date Due  Influenza Age 5 to Adult  08/01/2018  MICROALBUMIN Q1  08/04/2018  MEDICARE YEARLY EXAM  08/05/2018  HEMOGLOBIN A1C Q6M  09/23/2018 Please order/place referral if appropriate. Advance Directive: 1. Do you have an advance directive in place? Patient Reply: yes 2. If not, would you like material regarding how to put one in place? Patient Reply: no 
 
Coordination of Care: 1. Have you been to the ER, urgent care clinic since your last visit? Hospitalized since your last visit? no 
 
2. Have you seen or consulted any other health care providers outside of the [address] [place] [address] [place] since your last visit? Include any pap smears or colon screening. no 
 
ADL Assessment 9/20/2018 Feeding yourself No Help Needed Getting from bed to chair No Help Needed Getting dressed No Help Needed Bathing or showering No Help Needed Walk across the room (includes cane/walker) Help Needed Using the telphone No Help Needed Taking your medications No Help Needed Preparing meals No Help Needed Managing money (expenses/bills) No Help Needed Moderately strenuous housework (laundry) Help Needed Shopping for personal items (toiletries/medicines) Help Needed Shopping for groceries Help Needed Driving Help Needed Climbing a flight of stairs Help Needed Getting to places beyond walking distances Help Needed Immunization/s administered 9/20/2018 by Carrie Mcgee LPN with guardian's consent. Patient tolerated procedure well. No reactions noted. VIS given. Pt instructed to remain in clinic 15-20 minutes to be monitored for s/e.

## 2019-01-01 ENCOUNTER — APPOINTMENT (OUTPATIENT)
Dept: NON INVASIVE DIAGNOSTICS | Age: 79
DRG: 189 | End: 2019-01-01
Attending: HOSPITALIST
Payer: MEDICARE

## 2019-01-01 ENCOUNTER — HOSPITAL ENCOUNTER (INPATIENT)
Age: 79
LOS: 14 days | Discharge: HOSPICE | DRG: 189 | End: 2019-06-17
Attending: EMERGENCY MEDICINE | Admitting: HOSPITALIST
Payer: MEDICARE

## 2019-01-01 ENCOUNTER — APPOINTMENT (OUTPATIENT)
Dept: CT IMAGING | Age: 79
DRG: 189 | End: 2019-01-01
Attending: INTERNAL MEDICINE
Payer: MEDICARE

## 2019-01-01 ENCOUNTER — APPOINTMENT (OUTPATIENT)
Dept: GENERAL RADIOLOGY | Age: 79
DRG: 189 | End: 2019-01-01
Attending: EMERGENCY MEDICINE
Payer: MEDICARE

## 2019-01-01 ENCOUNTER — HOME CARE VISIT (OUTPATIENT)
Dept: SCHEDULING | Facility: HOME HEALTH | Age: 79
End: 2019-01-01
Payer: MEDICARE

## 2019-01-01 ENCOUNTER — APPOINTMENT (OUTPATIENT)
Dept: VASCULAR SURGERY | Age: 79
DRG: 189 | End: 2019-01-01
Attending: PHYSICIAN ASSISTANT
Payer: MEDICARE

## 2019-01-01 ENCOUNTER — APPOINTMENT (OUTPATIENT)
Dept: GENERAL RADIOLOGY | Age: 79
DRG: 189 | End: 2019-01-01
Attending: INTERNAL MEDICINE
Payer: MEDICARE

## 2019-01-01 ENCOUNTER — APPOINTMENT (OUTPATIENT)
Dept: GENERAL RADIOLOGY | Age: 79
DRG: 189 | End: 2019-01-01
Attending: PHYSICIAN ASSISTANT
Payer: MEDICARE

## 2019-01-01 ENCOUNTER — APPOINTMENT (OUTPATIENT)
Dept: GENERAL RADIOLOGY | Age: 79
DRG: 189 | End: 2019-01-01
Attending: NURSE PRACTITIONER
Payer: MEDICARE

## 2019-01-01 ENCOUNTER — APPOINTMENT (OUTPATIENT)
Dept: GENERAL RADIOLOGY | Age: 79
DRG: 189 | End: 2019-01-01
Attending: HOSPITALIST
Payer: MEDICARE

## 2019-01-01 ENCOUNTER — HOME CARE VISIT (OUTPATIENT)
Dept: HOSPICE | Facility: HOSPICE | Age: 79
End: 2019-01-01
Payer: MEDICARE

## 2019-01-01 ENCOUNTER — HOSPICE ADMISSION (OUTPATIENT)
Dept: HOSPICE | Facility: HOSPICE | Age: 79
End: 2019-01-01
Payer: MEDICARE

## 2019-01-01 ENCOUNTER — OFFICE VISIT (OUTPATIENT)
Dept: INTERNAL MEDICINE CLINIC | Age: 79
End: 2019-01-01

## 2019-01-01 VITALS
TEMPERATURE: 102.7 F | BODY MASS INDEX: 38.32 KG/M2 | HEIGHT: 65 IN | SYSTOLIC BLOOD PRESSURE: 80 MMHG | RESPIRATION RATE: 22 BRPM | HEART RATE: 86 BPM | DIASTOLIC BLOOD PRESSURE: 55 MMHG | WEIGHT: 230 LBS

## 2019-01-01 VITALS
OXYGEN SATURATION: 84 % | RESPIRATION RATE: 20 BRPM | SYSTOLIC BLOOD PRESSURE: 158 MMHG | DIASTOLIC BLOOD PRESSURE: 70 MMHG | TEMPERATURE: 99.7 F | HEART RATE: 108 BPM

## 2019-01-01 VITALS
RESPIRATION RATE: 20 BRPM | SYSTOLIC BLOOD PRESSURE: 216 MMHG | DIASTOLIC BLOOD PRESSURE: 77 MMHG | WEIGHT: 235 LBS | HEIGHT: 65 IN | HEART RATE: 62 BPM | OXYGEN SATURATION: 96 % | BODY MASS INDEX: 39.15 KG/M2 | TEMPERATURE: 95.6 F

## 2019-01-01 VITALS
TEMPERATURE: 35.6 F | HEART RATE: 84 BPM | SYSTOLIC BLOOD PRESSURE: 144 MMHG | WEIGHT: 230 LBS | HEIGHT: 65 IN | BODY MASS INDEX: 38.32 KG/M2 | DIASTOLIC BLOOD PRESSURE: 75 MMHG | OXYGEN SATURATION: 96 % | RESPIRATION RATE: 18 BRPM

## 2019-01-01 DIAGNOSIS — R63.5 WEIGHT GAIN: ICD-10-CM

## 2019-01-01 DIAGNOSIS — I70.209 ARTERIAL OCCLUSION, LOWER EXTREMITY (HCC): ICD-10-CM

## 2019-01-01 DIAGNOSIS — E11.3299 TYPE 2 DIABETES MELLITUS WITH MILD NONPROLIFERATIVE RETINOPATHY WITHOUT MACULAR EDEMA, WITHOUT LONG-TERM CURRENT USE OF INSULIN, UNSPECIFIED LATERALITY (HCC): Primary | ICD-10-CM

## 2019-01-01 DIAGNOSIS — T68.XXXA HYPOTHERMIA, INITIAL ENCOUNTER: ICD-10-CM

## 2019-01-01 DIAGNOSIS — R60.0 PEDAL EDEMA: ICD-10-CM

## 2019-01-01 DIAGNOSIS — E11.21 TYPE 2 DIABETES WITH NEPHROPATHY (HCC): Chronic | ICD-10-CM

## 2019-01-01 DIAGNOSIS — I50.9 ACUTE CONGESTIVE HEART FAILURE, UNSPECIFIED HEART FAILURE TYPE (HCC): ICD-10-CM

## 2019-01-01 DIAGNOSIS — E66.01 SEVERE OBESITY (HCC): ICD-10-CM

## 2019-01-01 DIAGNOSIS — R06.00 DYSPNEA, UNSPECIFIED TYPE: ICD-10-CM

## 2019-01-01 DIAGNOSIS — E16.2 ACUTE METABOLIC ENCEPHALOPATHY DUE TO HYPOGLYCEMIA: Primary | ICD-10-CM

## 2019-01-01 DIAGNOSIS — E78.00 HYPERCHOLESTEROLEMIA: ICD-10-CM

## 2019-01-01 DIAGNOSIS — J96.02 ACUTE RESPIRATORY FAILURE WITH HYPERCAPNIA (HCC): ICD-10-CM

## 2019-01-01 DIAGNOSIS — Z76.0 MEDICATION REFILL: ICD-10-CM

## 2019-01-01 DIAGNOSIS — I10 ESSENTIAL HYPERTENSION: ICD-10-CM

## 2019-01-01 DIAGNOSIS — E07.9 THYROID DISEASE: ICD-10-CM

## 2019-01-01 DIAGNOSIS — G93.41 ACUTE METABOLIC ENCEPHALOPATHY DUE TO HYPOGLYCEMIA: Primary | ICD-10-CM

## 2019-01-01 LAB
ABO + RH BLD: NORMAL
ABO + RH BLD: NORMAL
ALBUMIN SERPL-MCNC: 2.7 G/DL (ref 3.4–5)
ALBUMIN SERPL-MCNC: 2.8 G/DL (ref 3.4–5)
ALBUMIN SERPL-MCNC: 2.8 G/DL (ref 3.4–5)
ALBUMIN SERPL-MCNC: 2.9 G/DL (ref 3.4–5)
ALBUMIN SERPL-MCNC: 3 G/DL (ref 3.4–5)
ALBUMIN SERPL-MCNC: 3.1 G/DL (ref 3.4–5)
ALBUMIN SERPL-MCNC: 3.1 G/DL (ref 3.4–5)
ALBUMIN SERPL-MCNC: 3.3 G/DL (ref 3.4–5)
ALBUMIN SERPL-MCNC: 3.6 G/DL (ref 3.4–5)
ALBUMIN/GLOB SERPL: 0.7 {RATIO} (ref 0.8–1.7)
ALBUMIN/GLOB SERPL: 0.8 {RATIO} (ref 0.8–1.7)
ALP SERPL-CCNC: 69 U/L (ref 45–117)
ALP SERPL-CCNC: 83 U/L (ref 45–117)
ALT SERPL-CCNC: 19 U/L (ref 13–56)
ALT SERPL-CCNC: 30 U/L (ref 13–56)
AMORPH CRY URNS QL MICRO: ABNORMAL
ANION GAP BLD CALC-SCNC: 16 MMOL/L (ref 10–20)
ANION GAP SERPL CALC-SCNC: 10 MMOL/L (ref 3–18)
ANION GAP SERPL CALC-SCNC: 10 MMOL/L (ref 3–18)
ANION GAP SERPL CALC-SCNC: 12 MMOL/L (ref 3–18)
ANION GAP SERPL CALC-SCNC: 12 MMOL/L (ref 3–18)
ANION GAP SERPL CALC-SCNC: 3 MMOL/L (ref 3–18)
ANION GAP SERPL CALC-SCNC: 5 MMOL/L (ref 3–18)
ANION GAP SERPL CALC-SCNC: 6 MMOL/L (ref 3–18)
ANION GAP SERPL CALC-SCNC: 6 MMOL/L (ref 3–18)
ANION GAP SERPL CALC-SCNC: 7 MMOL/L (ref 3–18)
ANION GAP SERPL CALC-SCNC: 8 MMOL/L (ref 3–18)
ANION GAP SERPL CALC-SCNC: 9 MMOL/L (ref 3–18)
APPEARANCE UR: ABNORMAL
APTT PPP: 102.8 SEC (ref 23–36.4)
APTT PPP: 107.7 SEC (ref 23–36.4)
APTT PPP: 114.1 SEC (ref 23–36.4)
APTT PPP: 44.3 SEC (ref 23–36.4)
APTT PPP: 45.5 SEC (ref 23–36.4)
APTT PPP: 46.1 SEC (ref 23–36.4)
APTT PPP: 48.6 SEC (ref 23–36.4)
APTT PPP: 51.5 SEC (ref 23–36.4)
APTT PPP: 54.5 SEC (ref 23–36.4)
APTT PPP: 59.5 SEC (ref 23–36.4)
APTT PPP: 66 SEC (ref 23–36.4)
APTT PPP: 68.7 SEC (ref 23–36.4)
APTT PPP: 69.4 SEC (ref 23–36.4)
APTT PPP: 85.5 SEC (ref 23–36.4)
APTT PPP: 92.1 SEC (ref 23–36.4)
APTT PPP: 94.9 SEC (ref 23–36.4)
APTT PPP: >180 SEC (ref 23–36.4)
ARTERIAL PATENCY WRIST A: ABNORMAL
AST SERPL-CCNC: 34 U/L (ref 15–37)
AST SERPL-CCNC: 82 U/L (ref 15–37)
ATRIAL RATE: 53 BPM
ATRIAL RATE: 63 BPM
BACTERIA SPEC CULT: ABNORMAL
BACTERIA SPEC CULT: NORMAL
BACTERIA URNS QL MICRO: ABNORMAL /HPF
BASE DEFICIT BLDV-SCNC: 2 MMOL/L
BASE DEFICIT BLDV-SCNC: 6 MMOL/L
BASE DEFICIT BLDV-SCNC: 7 MMOL/L
BASOPHILS # BLD: 0 K/UL (ref 0–0.1)
BASOPHILS NFR BLD: 0 % (ref 0–2)
BDY SITE: ABNORMAL
BILIRUB DIRECT SERPL-MCNC: 0.4 MG/DL (ref 0–0.2)
BILIRUB SERPL-MCNC: 0.7 MG/DL (ref 0.2–1)
BILIRUB SERPL-MCNC: 0.8 MG/DL (ref 0.2–1)
BILIRUB UR QL: NEGATIVE
BLD PROD TYP BPU: NORMAL
BLD PROD TYP BPU: NORMAL
BLOOD GROUP ANTIBODIES SERPL: NORMAL
BLOOD GROUP ANTIBODIES SERPL: NORMAL
BNP SERPL-MCNC: ABNORMAL PG/ML (ref 0–1800)
BODY TEMPERATURE: 98.4
BPU ID: NORMAL
BPU ID: NORMAL
BUN BLD-MCNC: 28 MG/DL (ref 7–18)
BUN SERPL-MCNC: 27 MG/DL (ref 7–18)
BUN SERPL-MCNC: 28 MG/DL (ref 7–18)
BUN SERPL-MCNC: 29 MG/DL (ref 7–18)
BUN SERPL-MCNC: 33 MG/DL (ref 7–18)
BUN SERPL-MCNC: 38 MG/DL (ref 7–18)
BUN SERPL-MCNC: 41 MG/DL (ref 7–18)
BUN SERPL-MCNC: 46 MG/DL (ref 7–18)
BUN SERPL-MCNC: 48 MG/DL (ref 7–18)
BUN SERPL-MCNC: 49 MG/DL (ref 7–18)
BUN SERPL-MCNC: 49 MG/DL (ref 7–18)
BUN SERPL-MCNC: 50 MG/DL (ref 7–18)
BUN/CREAT SERPL: 19 (ref 12–20)
BUN/CREAT SERPL: 19 (ref 12–20)
BUN/CREAT SERPL: 20 (ref 12–20)
BUN/CREAT SERPL: 21 (ref 12–20)
BUN/CREAT SERPL: 22 (ref 12–20)
BUN/CREAT SERPL: 23 (ref 12–20)
BUN/CREAT SERPL: 24 (ref 12–20)
BUN/CREAT SERPL: 24 (ref 12–20)
BUN/CREAT SERPL: 27 (ref 12–20)
BUN/CREAT SERPL: 28 (ref 12–20)
BUN/CREAT SERPL: 33 (ref 12–20)
BUN/CREAT SERPL: 39 (ref 12–20)
C PEPTIDE SERPL-MCNC: 1.8 NG/ML (ref 1.1–4.4)
CA-I BLD-MCNC: 1.31 MMOL/L (ref 1.12–1.32)
CALCIUM SERPL-MCNC: 7.8 MG/DL (ref 8.5–10.1)
CALCIUM SERPL-MCNC: 7.9 MG/DL (ref 8.5–10.1)
CALCIUM SERPL-MCNC: 8.1 MG/DL (ref 8.5–10.1)
CALCIUM SERPL-MCNC: 8.2 MG/DL (ref 8.5–10.1)
CALCIUM SERPL-MCNC: 8.2 MG/DL (ref 8.5–10.1)
CALCIUM SERPL-MCNC: 8.3 MG/DL (ref 8.5–10.1)
CALCIUM SERPL-MCNC: 8.4 MG/DL (ref 8.5–10.1)
CALCIUM SERPL-MCNC: 8.5 MG/DL (ref 8.5–10.1)
CALCIUM SERPL-MCNC: 8.5 MG/DL (ref 8.5–10.1)
CALCIUM SERPL-MCNC: 9.1 MG/DL (ref 8.5–10.1)
CALCULATED R AXIS, ECG10: -12 DEGREES
CALCULATED R AXIS, ECG10: 30 DEGREES
CALCULATED T AXIS, ECG11: -35 DEGREES
CALCULATED T AXIS, ECG11: -36 DEGREES
CALLED TO:,BCALL1: NORMAL
CALLED TO:,BCALL1: NORMAL
CHLORIDE BLD-SCNC: 98 MMOL/L (ref 100–108)
CHLORIDE SERPL-SCNC: 100 MMOL/L (ref 100–108)
CHLORIDE SERPL-SCNC: 101 MMOL/L (ref 100–108)
CHLORIDE SERPL-SCNC: 102 MMOL/L (ref 100–108)
CHLORIDE SERPL-SCNC: 103 MMOL/L (ref 100–108)
CHLORIDE SERPL-SCNC: 105 MMOL/L (ref 100–108)
CHLORIDE SERPL-SCNC: 105 MMOL/L (ref 100–108)
CHLORIDE SERPL-SCNC: 98 MMOL/L (ref 100–108)
CHLORIDE SERPL-SCNC: 99 MMOL/L (ref 100–108)
CHLORIDE SERPL-SCNC: 99 MMOL/L (ref 100–108)
CK MB CFR SERPL CALC: 0.9 % (ref 0–4)
CK MB CFR SERPL CALC: 2.8 % (ref 0–4)
CK MB SERPL-MCNC: 2.4 NG/ML (ref 5–25)
CK MB SERPL-MCNC: 7.8 NG/ML (ref 5–25)
CK SERPL-CCNC: 268 U/L (ref 26–192)
CK SERPL-CCNC: 282 U/L (ref 26–192)
CO2 BLD-SCNC: 24 MMOL/L (ref 19–24)
CO2 SERPL-SCNC: 20 MMOL/L (ref 21–32)
CO2 SERPL-SCNC: 23 MMOL/L (ref 21–32)
CO2 SERPL-SCNC: 24 MMOL/L (ref 21–32)
CO2 SERPL-SCNC: 24 MMOL/L (ref 21–32)
CO2 SERPL-SCNC: 25 MMOL/L (ref 21–32)
CO2 SERPL-SCNC: 25 MMOL/L (ref 21–32)
CO2 SERPL-SCNC: 26 MMOL/L (ref 21–32)
CO2 SERPL-SCNC: 29 MMOL/L (ref 21–32)
CO2 SERPL-SCNC: 33 MMOL/L (ref 21–32)
CO2 SERPL-SCNC: 36 MMOL/L (ref 21–32)
COLOR UR: ABNORMAL
CREAT SERPL-MCNC: 1.19 MG/DL (ref 0.6–1.3)
CREAT SERPL-MCNC: 1.4 MG/DL (ref 0.6–1.3)
CREAT SERPL-MCNC: 1.43 MG/DL (ref 0.6–1.3)
CREAT SERPL-MCNC: 1.45 MG/DL (ref 0.6–1.3)
CREAT SERPL-MCNC: 1.46 MG/DL (ref 0.6–1.3)
CREAT SERPL-MCNC: 1.55 MG/DL (ref 0.6–1.3)
CREAT SERPL-MCNC: 1.69 MG/DL (ref 0.6–1.3)
CREAT SERPL-MCNC: 1.81 MG/DL (ref 0.6–1.3)
CREAT SERPL-MCNC: 1.81 MG/DL (ref 0.6–1.3)
CREAT SERPL-MCNC: 1.84 MG/DL (ref 0.6–1.3)
CREAT SERPL-MCNC: 2.07 MG/DL (ref 0.6–1.3)
CREAT SERPL-MCNC: 2.1 MG/DL (ref 0.6–1.3)
CREAT SERPL-MCNC: 2.12 MG/DL (ref 0.6–1.3)
CREAT SERPL-MCNC: 2.18 MG/DL (ref 0.6–1.3)
CREAT UR-MCNC: 1.3 MG/DL (ref 0.6–1.3)
CREAT UR-MCNC: 14.8 MG/DL (ref 30–125)
CROSSMATCH RESULT,%XM: NORMAL
CROSSMATCH RESULT,%XM: NORMAL
DIAGNOSIS, 93000: NORMAL
DIAGNOSIS, 93000: NORMAL
DIFFERENTIAL METHOD BLD: ABNORMAL
ECHO PULMONARY ARTERY SYSTOLIC PRESSURE (PASP): 46 MMHG
EOSINOPHIL # BLD: 0 K/UL (ref 0–0.4)
EOSINOPHIL # BLD: 0.1 K/UL (ref 0–0.4)
EOSINOPHIL # BLD: 0.3 K/UL (ref 0–0.4)
EOSINOPHIL # BLD: 0.4 K/UL (ref 0–0.4)
EOSINOPHIL # BLD: 0.5 K/UL (ref 0–0.4)
EOSINOPHIL # BLD: 0.5 K/UL (ref 0–0.4)
EOSINOPHIL NFR BLD: 0 % (ref 0–5)
EOSINOPHIL NFR BLD: 1 % (ref 0–5)
EOSINOPHIL NFR BLD: 2 % (ref 0–5)
EOSINOPHIL NFR BLD: 3 % (ref 0–5)
EPITH CASTS URNS QL MICRO: ABNORMAL /LPF (ref 0–5)
ERYTHROCYTE [DISTWIDTH] IN BLOOD BY AUTOMATED COUNT: 17.6 % (ref 11.6–14.5)
ERYTHROCYTE [DISTWIDTH] IN BLOOD BY AUTOMATED COUNT: 17.6 % (ref 11.6–14.5)
ERYTHROCYTE [DISTWIDTH] IN BLOOD BY AUTOMATED COUNT: 18.1 % (ref 11.6–14.5)
ERYTHROCYTE [DISTWIDTH] IN BLOOD BY AUTOMATED COUNT: 18.2 % (ref 11.6–14.5)
ERYTHROCYTE [DISTWIDTH] IN BLOOD BY AUTOMATED COUNT: 18.2 % (ref 11.6–14.5)
ERYTHROCYTE [DISTWIDTH] IN BLOOD BY AUTOMATED COUNT: 18.4 % (ref 11.6–14.5)
ERYTHROCYTE [DISTWIDTH] IN BLOOD BY AUTOMATED COUNT: 18.5 % (ref 11.6–14.5)
ERYTHROCYTE [DISTWIDTH] IN BLOOD BY AUTOMATED COUNT: 18.5 % (ref 11.6–14.5)
ERYTHROCYTE [DISTWIDTH] IN BLOOD BY AUTOMATED COUNT: 18.6 % (ref 11.6–14.5)
ERYTHROCYTE [DISTWIDTH] IN BLOOD BY AUTOMATED COUNT: 18.7 % (ref 11.6–14.5)
ERYTHROCYTE [DISTWIDTH] IN BLOOD BY AUTOMATED COUNT: 18.8 % (ref 11.6–14.5)
ERYTHROCYTE [DISTWIDTH] IN BLOOD BY AUTOMATED COUNT: 19.3 % (ref 11.6–14.5)
GAS FLOW.O2 O2 DELIVERY SYS: ABNORMAL L/MIN
GAS FLOW.O2 SETTING OXYMISER: 3 L/M
GAS FLOW.O2 SETTING OXYMISER: 4 L/M
GLOBULIN SER CALC-MCNC: 3.5 G/DL (ref 2–4)
GLOBULIN SER CALC-MCNC: 4.2 G/DL (ref 2–4)
GLUCOSE BLD STRIP.AUTO-MCNC: 103 MG/DL (ref 70–110)
GLUCOSE BLD STRIP.AUTO-MCNC: 107 MG/DL (ref 70–110)
GLUCOSE BLD STRIP.AUTO-MCNC: 108 MG/DL (ref 70–110)
GLUCOSE BLD STRIP.AUTO-MCNC: 109 MG/DL (ref 70–110)
GLUCOSE BLD STRIP.AUTO-MCNC: 114 MG/DL (ref 70–110)
GLUCOSE BLD STRIP.AUTO-MCNC: 122 MG/DL (ref 70–110)
GLUCOSE BLD STRIP.AUTO-MCNC: 125 MG/DL (ref 70–110)
GLUCOSE BLD STRIP.AUTO-MCNC: 126 MG/DL (ref 70–110)
GLUCOSE BLD STRIP.AUTO-MCNC: 135 MG/DL (ref 70–110)
GLUCOSE BLD STRIP.AUTO-MCNC: 136 MG/DL (ref 70–110)
GLUCOSE BLD STRIP.AUTO-MCNC: 136 MG/DL (ref 70–110)
GLUCOSE BLD STRIP.AUTO-MCNC: 137 MG/DL (ref 70–110)
GLUCOSE BLD STRIP.AUTO-MCNC: 143 MG/DL (ref 70–110)
GLUCOSE BLD STRIP.AUTO-MCNC: 143 MG/DL (ref 70–110)
GLUCOSE BLD STRIP.AUTO-MCNC: 147 MG/DL (ref 70–110)
GLUCOSE BLD STRIP.AUTO-MCNC: 148 MG/DL (ref 70–110)
GLUCOSE BLD STRIP.AUTO-MCNC: 149 MG/DL (ref 70–110)
GLUCOSE BLD STRIP.AUTO-MCNC: 157 MG/DL (ref 70–110)
GLUCOSE BLD STRIP.AUTO-MCNC: 165 MG/DL (ref 70–110)
GLUCOSE BLD STRIP.AUTO-MCNC: 165 MG/DL (ref 70–110)
GLUCOSE BLD STRIP.AUTO-MCNC: 171 MG/DL (ref 70–110)
GLUCOSE BLD STRIP.AUTO-MCNC: 171 MG/DL (ref 70–110)
GLUCOSE BLD STRIP.AUTO-MCNC: 173 MG/DL (ref 70–110)
GLUCOSE BLD STRIP.AUTO-MCNC: 174 MG/DL (ref 70–110)
GLUCOSE BLD STRIP.AUTO-MCNC: 183 MG/DL (ref 70–110)
GLUCOSE BLD STRIP.AUTO-MCNC: 184 MG/DL (ref 70–110)
GLUCOSE BLD STRIP.AUTO-MCNC: 188 MG/DL (ref 70–110)
GLUCOSE BLD STRIP.AUTO-MCNC: 192 MG/DL (ref 70–110)
GLUCOSE BLD STRIP.AUTO-MCNC: 200 MG/DL (ref 70–110)
GLUCOSE BLD STRIP.AUTO-MCNC: 203 MG/DL (ref 70–110)
GLUCOSE BLD STRIP.AUTO-MCNC: 203 MG/DL (ref 70–110)
GLUCOSE BLD STRIP.AUTO-MCNC: 208 MG/DL (ref 70–110)
GLUCOSE BLD STRIP.AUTO-MCNC: 209 MG/DL (ref 70–110)
GLUCOSE BLD STRIP.AUTO-MCNC: 213 MG/DL (ref 70–110)
GLUCOSE BLD STRIP.AUTO-MCNC: 215 MG/DL (ref 70–110)
GLUCOSE BLD STRIP.AUTO-MCNC: 216 MG/DL (ref 70–110)
GLUCOSE BLD STRIP.AUTO-MCNC: 218 MG/DL (ref 70–110)
GLUCOSE BLD STRIP.AUTO-MCNC: 221 MG/DL (ref 70–110)
GLUCOSE BLD STRIP.AUTO-MCNC: 226 MG/DL (ref 70–110)
GLUCOSE BLD STRIP.AUTO-MCNC: 228 MG/DL (ref 70–110)
GLUCOSE BLD STRIP.AUTO-MCNC: 228 MG/DL (ref 70–110)
GLUCOSE BLD STRIP.AUTO-MCNC: 231 MG/DL (ref 70–110)
GLUCOSE BLD STRIP.AUTO-MCNC: 237 MG/DL (ref 70–110)
GLUCOSE BLD STRIP.AUTO-MCNC: 239 MG/DL (ref 70–110)
GLUCOSE BLD STRIP.AUTO-MCNC: 242 MG/DL (ref 70–110)
GLUCOSE BLD STRIP.AUTO-MCNC: 247 MG/DL (ref 70–110)
GLUCOSE BLD STRIP.AUTO-MCNC: 251 MG/DL (ref 70–110)
GLUCOSE BLD STRIP.AUTO-MCNC: 260 MG/DL (ref 70–110)
GLUCOSE BLD STRIP.AUTO-MCNC: 266 MG/DL (ref 74–106)
GLUCOSE BLD STRIP.AUTO-MCNC: 280 MG/DL (ref 70–110)
GLUCOSE BLD STRIP.AUTO-MCNC: 289 MG/DL (ref 70–110)
GLUCOSE BLD STRIP.AUTO-MCNC: 43 MG/DL (ref 70–110)
GLUCOSE BLD STRIP.AUTO-MCNC: 57 MG/DL (ref 70–110)
GLUCOSE BLD STRIP.AUTO-MCNC: 60 MG/DL (ref 70–110)
GLUCOSE BLD STRIP.AUTO-MCNC: 63 MG/DL (ref 70–110)
GLUCOSE BLD STRIP.AUTO-MCNC: 65 MG/DL (ref 70–110)
GLUCOSE BLD STRIP.AUTO-MCNC: 68 MG/DL (ref 70–110)
GLUCOSE BLD STRIP.AUTO-MCNC: 68 MG/DL (ref 70–110)
GLUCOSE BLD STRIP.AUTO-MCNC: 72 MG/DL (ref 70–110)
GLUCOSE BLD STRIP.AUTO-MCNC: 73 MG/DL (ref 70–110)
GLUCOSE BLD STRIP.AUTO-MCNC: 76 MG/DL (ref 70–110)
GLUCOSE BLD STRIP.AUTO-MCNC: 81 MG/DL (ref 70–110)
GLUCOSE BLD STRIP.AUTO-MCNC: 83 MG/DL (ref 70–110)
GLUCOSE BLD STRIP.AUTO-MCNC: 84 MG/DL (ref 70–110)
GLUCOSE BLD STRIP.AUTO-MCNC: 89 MG/DL (ref 70–110)
GLUCOSE BLD STRIP.AUTO-MCNC: 90 MG/DL (ref 70–110)
GLUCOSE BLD STRIP.AUTO-MCNC: 95 MG/DL (ref 70–110)
GLUCOSE BLD STRIP.AUTO-MCNC: <30 MG/DL (ref 70–110)
GLUCOSE SERPL-MCNC: 122 MG/DL (ref 74–99)
GLUCOSE SERPL-MCNC: 131 MG/DL (ref 74–99)
GLUCOSE SERPL-MCNC: 137 MG/DL (ref 74–99)
GLUCOSE SERPL-MCNC: 154 MG/DL (ref 74–99)
GLUCOSE SERPL-MCNC: 168 MG/DL (ref 74–99)
GLUCOSE SERPL-MCNC: 173 MG/DL (ref 74–99)
GLUCOSE SERPL-MCNC: 173 MG/DL (ref 74–99)
GLUCOSE SERPL-MCNC: 224 MG/DL (ref 74–99)
GLUCOSE SERPL-MCNC: 256 MG/DL (ref 74–99)
GLUCOSE SERPL-MCNC: 289 MG/DL (ref 74–99)
GLUCOSE SERPL-MCNC: 64 MG/DL (ref 74–99)
GLUCOSE SERPL-MCNC: 71 MG/DL (ref 74–99)
GLUCOSE SERPL-MCNC: 75 MG/DL (ref 74–99)
GLUCOSE SERPL-MCNC: 89 MG/DL (ref 74–99)
GLUCOSE UR STRIP.AUTO-MCNC: NEGATIVE MG/DL
GRAM STN SPEC: ABNORMAL
HBA1C MFR BLD: 5.2 % (ref 4.2–5.6)
HCO3 BLDV-SCNC: 20.5 MMOL/L (ref 23–28)
HCO3 BLDV-SCNC: 23.6 MMOL/L (ref 23–28)
HCO3 BLDV-SCNC: 23.9 MMOL/L (ref 23–28)
HCT VFR BLD AUTO: 18.9 % (ref 35–45)
HCT VFR BLD AUTO: 20.3 % (ref 35–45)
HCT VFR BLD AUTO: 21 % (ref 35–45)
HCT VFR BLD AUTO: 21.6 % (ref 35–45)
HCT VFR BLD AUTO: 22.1 % (ref 35–45)
HCT VFR BLD AUTO: 22.3 % (ref 35–45)
HCT VFR BLD AUTO: 22.9 % (ref 35–45)
HCT VFR BLD AUTO: 24.8 % (ref 35–45)
HCT VFR BLD AUTO: 28.5 % (ref 35–45)
HCT VFR BLD AUTO: 31.1 % (ref 35–45)
HCT VFR BLD AUTO: 31.2 % (ref 35–45)
HCT VFR BLD AUTO: 38.4 % (ref 35–45)
HCT VFR BLD CALC: 43 % (ref 36–49)
HGB BLD-MCNC: 10 G/DL (ref 12–16)
HGB BLD-MCNC: 11.9 G/DL (ref 12–16)
HGB BLD-MCNC: 14.6 G/DL (ref 12–16)
HGB BLD-MCNC: 6.2 G/DL (ref 12–16)
HGB BLD-MCNC: 6.5 G/DL (ref 12–16)
HGB BLD-MCNC: 7 G/DL (ref 12–16)
HGB BLD-MCNC: 7 G/DL (ref 12–16)
HGB BLD-MCNC: 7.1 G/DL (ref 12–16)
HGB BLD-MCNC: 7.3 G/DL (ref 12–16)
HGB BLD-MCNC: 7.4 G/DL (ref 12–16)
HGB BLD-MCNC: 8.2 G/DL (ref 12–16)
HGB BLD-MCNC: 9.2 G/DL (ref 12–16)
HGB BLD-MCNC: 9.9 G/DL (ref 12–16)
HGB UR QL STRIP: ABNORMAL
KETONES UR QL STRIP.AUTO: NEGATIVE MG/DL
LACTATE BLD-SCNC: 1.59 MMOL/L (ref 0.4–2)
LEFT CCA DIST DIAS: 23.6 CM/S
LEFT CCA DIST SYS: 125.8 CM/S
LEFT CCA MID DIAS: 23.6 CM/S
LEFT CCA MID SYS: 87.5 CM/S
LEFT CCA PROX DIAS: 27.2 CM/S
LEFT CCA PROX SYS: 125.8 CM/S
LEFT CFA DIST SYS PSV: 130.2 CM/S
LEFT ECA DIAS: 0 CM/S
LEFT ECA SYS: 118.9 CM/S
LEFT ICA DIST DIAS: 59.3 CM/S
LEFT ICA DIST SYS: 157.7 CM/S
LEFT ICA MID DIAS: 49 CM/S
LEFT ICA MID SYS: 183.6 CM/S
LEFT ICA PROX DIAS: 51.5 CM/S
LEFT ICA PROX SYS: 186.2 CM/S
LEFT ICA/CCA SYS: 1.5
LEFT POP A MID VEL RATIO: 0.65
LEFT POP A PROX VEL RATIO: 1.44
LEFT POPLITEAL MID SYS PSV: 65.1 CM/S
LEFT POPLITEAL PROX SYS PSV: 100.8 CM/S
LEFT PROX PFA A PSV: 46.1 CM/S
LEFT SFA DIST VEL RATIO: 1.19
LEFT SFA MID VEL RATIO: 0.56
LEFT SFA PROX VEL RATIO: 0.8
LEFT SUBCLAVIAN DIAS: 0 CM/S
LEFT SUBCLAVIAN SYS: 246.2 CM/S
LEFT SUPER FEMORAL DIST SYS PSV: 69.8 CM/S
LEFT SUPER FEMORAL MID SYS PSV: 58.9 CM/S
LEFT SUPER FEMORAL PROX SYS PSV: 104.5 CM/S
LEFT VERTEBRAL DIAS: 17.8 CM/S
LEFT VERTEBRAL SYS: 95.5 CM/S
LEUKOCYTE ESTERASE UR QL STRIP.AUTO: ABNORMAL
LYMPHOCYTES # BLD: 0.6 K/UL (ref 0.9–3.6)
LYMPHOCYTES # BLD: 0.6 K/UL (ref 0.9–3.6)
LYMPHOCYTES # BLD: 0.8 K/UL (ref 0.9–3.6)
LYMPHOCYTES # BLD: 0.9 K/UL (ref 0.9–3.6)
LYMPHOCYTES # BLD: 1.2 K/UL (ref 0.9–3.6)
LYMPHOCYTES # BLD: 1.3 K/UL (ref 0.9–3.6)
LYMPHOCYTES # BLD: 1.3 K/UL (ref 0.9–3.6)
LYMPHOCYTES # BLD: 1.6 K/UL (ref 0.9–3.6)
LYMPHOCYTES # BLD: 1.6 K/UL (ref 0.9–3.6)
LYMPHOCYTES # BLD: 1.7 K/UL (ref 0.9–3.6)
LYMPHOCYTES # BLD: 1.8 K/UL (ref 0.9–3.6)
LYMPHOCYTES # BLD: 1.8 K/UL (ref 0.9–3.6)
LYMPHOCYTES NFR BLD: 11 % (ref 21–52)
LYMPHOCYTES NFR BLD: 13 % (ref 21–52)
LYMPHOCYTES NFR BLD: 14 % (ref 21–52)
LYMPHOCYTES NFR BLD: 4 % (ref 21–52)
LYMPHOCYTES NFR BLD: 5 % (ref 21–52)
LYMPHOCYTES NFR BLD: 5 % (ref 21–52)
LYMPHOCYTES NFR BLD: 6 % (ref 21–52)
LYMPHOCYTES NFR BLD: 7 % (ref 21–52)
LYMPHOCYTES NFR BLD: 7 % (ref 21–52)
LYMPHOCYTES NFR BLD: 8 % (ref 21–52)
LYMPHOCYTES NFR BLD: 8 % (ref 21–52)
LYMPHOCYTES NFR BLD: 9 % (ref 21–52)
MCH RBC QN AUTO: 27.4 PG (ref 24–34)
MCH RBC QN AUTO: 27.4 PG (ref 24–34)
MCH RBC QN AUTO: 27.5 PG (ref 24–34)
MCH RBC QN AUTO: 27.7 PG (ref 24–34)
MCH RBC QN AUTO: 27.7 PG (ref 24–34)
MCH RBC QN AUTO: 28.1 PG (ref 24–34)
MCH RBC QN AUTO: 28.2 PG (ref 24–34)
MCH RBC QN AUTO: 28.2 PG (ref 24–34)
MCH RBC QN AUTO: 28.3 PG (ref 24–34)
MCH RBC QN AUTO: 28.8 PG (ref 24–34)
MCH RBC QN AUTO: 28.9 PG (ref 24–34)
MCH RBC QN AUTO: 29 PG (ref 24–34)
MCHC RBC AUTO-ENTMCNC: 31 G/DL (ref 31–37)
MCHC RBC AUTO-ENTMCNC: 31.4 G/DL (ref 31–37)
MCHC RBC AUTO-ENTMCNC: 31.8 G/DL (ref 31–37)
MCHC RBC AUTO-ENTMCNC: 32 G/DL (ref 31–37)
MCHC RBC AUTO-ENTMCNC: 32.1 G/DL (ref 31–37)
MCHC RBC AUTO-ENTMCNC: 32.3 G/DL (ref 31–37)
MCHC RBC AUTO-ENTMCNC: 32.3 G/DL (ref 31–37)
MCHC RBC AUTO-ENTMCNC: 32.8 G/DL (ref 31–37)
MCHC RBC AUTO-ENTMCNC: 32.9 G/DL (ref 31–37)
MCHC RBC AUTO-ENTMCNC: 33 G/DL (ref 31–37)
MCHC RBC AUTO-ENTMCNC: 33.1 G/DL (ref 31–37)
MCHC RBC AUTO-ENTMCNC: 33.3 G/DL (ref 31–37)
MCV RBC AUTO: 83.7 FL (ref 74–97)
MCV RBC AUTO: 85.3 FL (ref 74–97)
MCV RBC AUTO: 85.5 FL (ref 74–97)
MCV RBC AUTO: 85.5 FL (ref 74–97)
MCV RBC AUTO: 85.7 FL (ref 74–97)
MCV RBC AUTO: 85.7 FL (ref 74–97)
MCV RBC AUTO: 85.9 FL (ref 74–97)
MCV RBC AUTO: 86.4 FL (ref 74–97)
MCV RBC AUTO: 86.9 FL (ref 74–97)
MCV RBC AUTO: 89.5 FL (ref 74–97)
MCV RBC AUTO: 90.6 FL (ref 74–97)
MCV RBC AUTO: 92.5 FL (ref 74–97)
MONOCYTES # BLD: 0.4 K/UL (ref 0.05–1.2)
MONOCYTES # BLD: 0.6 K/UL (ref 0.05–1.2)
MONOCYTES # BLD: 0.7 K/UL (ref 0.05–1.2)
MONOCYTES # BLD: 0.9 K/UL (ref 0.05–1.2)
MONOCYTES # BLD: 1.1 K/UL (ref 0.05–1.2)
MONOCYTES # BLD: 1.2 K/UL (ref 0.05–1.2)
MONOCYTES # BLD: 1.3 K/UL (ref 0.05–1.2)
MONOCYTES # BLD: 1.4 K/UL (ref 0.05–1.2)
MONOCYTES # BLD: 1.6 K/UL (ref 0.05–1.2)
MONOCYTES # BLD: 1.7 K/UL (ref 0.05–1.2)
MONOCYTES # BLD: 1.8 K/UL (ref 0.05–1.2)
MONOCYTES # BLD: 2.3 K/UL (ref 0.05–1.2)
MONOCYTES NFR BLD: 10 % (ref 3–10)
MONOCYTES NFR BLD: 11 % (ref 3–10)
MONOCYTES NFR BLD: 12 % (ref 3–10)
MONOCYTES NFR BLD: 3 % (ref 3–10)
MONOCYTES NFR BLD: 4 % (ref 3–10)
MONOCYTES NFR BLD: 5 % (ref 3–10)
MONOCYTES NFR BLD: 7 % (ref 3–10)
MONOCYTES NFR BLD: 8 % (ref 3–10)
MONOCYTES NFR BLD: 9 % (ref 3–10)
MUCOUS THREADS URNS QL MICRO: ABNORMAL /LPF
NEUTS SEG # BLD: 10.3 K/UL (ref 1.8–8)
NEUTS SEG # BLD: 10.4 K/UL (ref 1.8–8)
NEUTS SEG # BLD: 11.2 K/UL (ref 1.8–8)
NEUTS SEG # BLD: 11.4 K/UL (ref 1.8–8)
NEUTS SEG # BLD: 12.6 K/UL (ref 1.8–8)
NEUTS SEG # BLD: 12.8 K/UL (ref 1.8–8)
NEUTS SEG # BLD: 15.8 K/UL (ref 1.8–8)
NEUTS SEG # BLD: 16.2 K/UL (ref 1.8–8)
NEUTS SEG # BLD: 16.4 K/UL (ref 1.8–8)
NEUTS SEG # BLD: 16.9 K/UL (ref 1.8–8)
NEUTS SEG # BLD: 16.9 K/UL (ref 1.8–8)
NEUTS SEG # BLD: 8.4 K/UL (ref 1.8–8)
NEUTS SEG NFR BLD: 73 % (ref 40–73)
NEUTS SEG NFR BLD: 75 % (ref 40–73)
NEUTS SEG NFR BLD: 78 % (ref 40–73)
NEUTS SEG NFR BLD: 81 % (ref 40–73)
NEUTS SEG NFR BLD: 81 % (ref 40–73)
NEUTS SEG NFR BLD: 82 % (ref 40–73)
NEUTS SEG NFR BLD: 82 % (ref 40–73)
NEUTS SEG NFR BLD: 84 % (ref 40–73)
NEUTS SEG NFR BLD: 86 % (ref 40–73)
NEUTS SEG NFR BLD: 91 % (ref 40–73)
NEUTS SEG NFR BLD: 91 % (ref 40–73)
NEUTS SEG NFR BLD: 92 % (ref 40–73)
NITRITE UR QL STRIP.AUTO: NEGATIVE
O2/TOTAL GAS SETTING VFR VENT: 45 %
PCO2 BLDV: 46 MMHG (ref 41–51)
PCO2 BLDV: 47.7 MMHG (ref 41–51)
PCO2 BLDV: 70.6 MMHG (ref 41–51)
PEEP RESPIRATORY: 8 CMH2O
PH BLDV: 7.13 [PH] (ref 7.32–7.42)
PH BLDV: 7.26 [PH] (ref 7.32–7.42)
PH BLDV: 7.31 [PH] (ref 7.32–7.42)
PH UR STRIP: 5 [PH] (ref 5–8)
PHOSPHATE SERPL-MCNC: 3.1 MG/DL (ref 2.5–4.9)
PHOSPHATE SERPL-MCNC: 3.4 MG/DL (ref 2.5–4.9)
PHOSPHATE SERPL-MCNC: 3.5 MG/DL (ref 2.5–4.9)
PHOSPHATE SERPL-MCNC: 3.6 MG/DL (ref 2.5–4.9)
PHOSPHATE SERPL-MCNC: 3.6 MG/DL (ref 2.5–4.9)
PHOSPHATE SERPL-MCNC: 3.8 MG/DL (ref 2.5–4.9)
PHOSPHATE SERPL-MCNC: 4 MG/DL (ref 2.5–4.9)
PIP ISTAT,IPIP: 16
PLATELET # BLD AUTO: 162 K/UL (ref 135–420)
PLATELET # BLD AUTO: 165 K/UL (ref 135–420)
PLATELET # BLD AUTO: 166 K/UL (ref 135–420)
PLATELET # BLD AUTO: 175 K/UL (ref 135–420)
PLATELET # BLD AUTO: 177 K/UL (ref 135–420)
PLATELET # BLD AUTO: 183 K/UL (ref 135–420)
PLATELET # BLD AUTO: 194 K/UL (ref 135–420)
PLATELET # BLD AUTO: 194 K/UL (ref 135–420)
PLATELET # BLD AUTO: 195 K/UL (ref 135–420)
PLATELET # BLD AUTO: 205 K/UL (ref 135–420)
PLATELET # BLD AUTO: 219 K/UL (ref 135–420)
PLATELET # BLD AUTO: 222 K/UL (ref 135–420)
PMV BLD AUTO: 8.7 FL (ref 9.2–11.8)
PMV BLD AUTO: 8.8 FL (ref 9.2–11.8)
PMV BLD AUTO: 8.9 FL (ref 9.2–11.8)
PMV BLD AUTO: 8.9 FL (ref 9.2–11.8)
PMV BLD AUTO: 9 FL (ref 9.2–11.8)
PMV BLD AUTO: 9.3 FL (ref 9.2–11.8)
PMV BLD AUTO: 9.4 FL (ref 9.2–11.8)
PMV BLD AUTO: 9.4 FL (ref 9.2–11.8)
PMV BLD AUTO: 9.6 FL (ref 9.2–11.8)
PMV BLD AUTO: 9.6 FL (ref 9.2–11.8)
PO2 BLDV: 18 MMHG (ref 25–40)
PO2 BLDV: 37 MMHG (ref 25–40)
PO2 BLDV: 61 MMHG (ref 25–40)
POTASSIUM BLD-SCNC: 4.7 MMOL/L (ref 3.5–5.5)
POTASSIUM SERPL-SCNC: 2.9 MMOL/L (ref 3.5–5.5)
POTASSIUM SERPL-SCNC: 3 MMOL/L (ref 3.5–5.5)
POTASSIUM SERPL-SCNC: 3 MMOL/L (ref 3.5–5.5)
POTASSIUM SERPL-SCNC: 3.1 MMOL/L (ref 3.5–5.5)
POTASSIUM SERPL-SCNC: 3.1 MMOL/L (ref 3.5–5.5)
POTASSIUM SERPL-SCNC: 3.2 MMOL/L (ref 3.5–5.5)
POTASSIUM SERPL-SCNC: 3.8 MMOL/L (ref 3.5–5.5)
POTASSIUM SERPL-SCNC: 3.9 MMOL/L (ref 3.5–5.5)
POTASSIUM SERPL-SCNC: 4 MMOL/L (ref 3.5–5.5)
POTASSIUM SERPL-SCNC: 4 MMOL/L (ref 3.5–5.5)
POTASSIUM SERPL-SCNC: 4.1 MMOL/L (ref 3.5–5.5)
POTASSIUM SERPL-SCNC: 4.2 MMOL/L (ref 3.5–5.5)
POTASSIUM SERPL-SCNC: 4.2 MMOL/L (ref 3.5–5.5)
POTASSIUM SERPL-SCNC: 4.3 MMOL/L (ref 3.5–5.5)
POTASSIUM SERPL-SCNC: 4.8 MMOL/L (ref 3.5–5.5)
PROT SERPL-MCNC: 6.2 G/DL (ref 6.4–8.2)
PROT SERPL-MCNC: 7.3 G/DL (ref 6.4–8.2)
PROT UR STRIP-MCNC: 100 MG/DL
PROT UR-MCNC: 13 MG/DL
PROT/CREAT UR-RTO: 0.9
Q-T INTERVAL, ECG07: 444 MS
Q-T INTERVAL, ECG07: 480 MS
QRS DURATION, ECG06: 116 MS
QRS DURATION, ECG06: 174 MS
QTC CALCULATION (BEZET), ECG08: 439 MS
QTC CALCULATION (BEZET), ECG08: 480 MS
RBC # BLD AUTO: 2.2 M/UL (ref 4.2–5.3)
RBC # BLD AUTO: 2.37 M/UL (ref 4.2–5.3)
RBC # BLD AUTO: 2.41 M/UL (ref 4.2–5.3)
RBC # BLD AUTO: 2.43 M/UL (ref 4.2–5.3)
RBC # BLD AUTO: 2.52 M/UL (ref 4.2–5.3)
RBC # BLD AUTO: 2.56 M/UL (ref 4.2–5.3)
RBC # BLD AUTO: 2.64 M/UL (ref 4.2–5.3)
RBC # BLD AUTO: 2.9 M/UL (ref 4.2–5.3)
RBC # BLD AUTO: 3.34 M/UL (ref 4.2–5.3)
RBC # BLD AUTO: 3.58 M/UL (ref 4.2–5.3)
RBC # BLD AUTO: 3.65 M/UL (ref 4.2–5.3)
RBC # BLD AUTO: 4.24 M/UL (ref 4.2–5.3)
RBC #/AREA URNS HPF: ABNORMAL /HPF (ref 0–5)
RIGHT CCA DIST DIAS: 6.4 CM/S
RIGHT CCA DIST SYS: 74.2 CM/S
RIGHT CCA MID DIAS: 7.7 CM/S
RIGHT CCA MID SYS: 65.1 CM/S
RIGHT CCA PROX DIAS: 9 CM/S
RIGHT CCA PROX SYS: 58.6 CM/S
RIGHT CFA DIST SYS PSV: 256.3 CM/S
RIGHT DIST ATA VELOCITY: 38.7 CM/S
RIGHT DIST PTA PSV: 0 CM/S
RIGHT ECA DIAS: 0 CM/S
RIGHT ECA SYS: 285.7 CM/S
RIGHT ICA DIST DIAS: 6.2 CM/S
RIGHT ICA DIST SYS: 30.3 CM/S
RIGHT ICA MID DIAS: 0 CM/S
RIGHT ICA MID SYS: 21.1 CM/S
RIGHT ICA/CCA SYS: 0.4
RIGHT MID ATA VELOCITY: 54.1 CM/S
RIGHT POP A DIST VEL RATIO: 1.17
RIGHT POP A MID VEL RATIO: 1.21
RIGHT POP A PROX VEL RATIO: 0.85
RIGHT POPLITEAL DIST SYS PSV: 81.6 CM/S
RIGHT POPLITEAL MID SYS PSV: 69.5 CM/S
RIGHT POPLITEAL PROX SYS PSV: 57.4 CM/S
RIGHT PROX ATA VELOCITY: 88.1 CM/S
RIGHT PROX PFA A PSV: 118.1 CM/S
RIGHT PROX PTA PSV: 0 CM/S
RIGHT PTA MID SYS PSV: 0 CM/S
RIGHT SFA DIST VEL RATIO: 0.73
RIGHT SFA MID VEL RATIO: 0.7
RIGHT SFA PROX VEL RATIO: 0.5
RIGHT SUBCLAVIAN DIAS: 0 CM/S
RIGHT SUBCLAVIAN SYS: 213 CM/S
RIGHT SUPER FEMORAL DIST SYS PSV: 67.8 CM/S
RIGHT SUPER FEMORAL MID SYS PSV: 92.3 CM/S
RIGHT SUPER FEMORAL PROX SYS PSV: 134.8 CM/S
RIGHT VERTEBRAL DIAS: 15.5 CM/S
RIGHT VERTEBRAL SYS: 75.5 CM/S
SAO2 % BLDV: 17 % (ref 65–88)
SAO2 % BLDV: 64 % (ref 65–88)
SAO2 % BLDV: 87 % (ref 65–88)
SERVICE CMNT-IMP: ABNORMAL
SERVICE CMNT-IMP: NORMAL
SODIUM BLD-SCNC: 132 MMOL/L (ref 136–145)
SODIUM SERPL-SCNC: 130 MMOL/L (ref 136–145)
SODIUM SERPL-SCNC: 131 MMOL/L (ref 136–145)
SODIUM SERPL-SCNC: 133 MMOL/L (ref 136–145)
SODIUM SERPL-SCNC: 133 MMOL/L (ref 136–145)
SODIUM SERPL-SCNC: 135 MMOL/L (ref 136–145)
SODIUM SERPL-SCNC: 136 MMOL/L (ref 136–145)
SODIUM SERPL-SCNC: 137 MMOL/L (ref 136–145)
SODIUM SERPL-SCNC: 137 MMOL/L (ref 136–145)
SODIUM SERPL-SCNC: 138 MMOL/L (ref 136–145)
SODIUM SERPL-SCNC: 140 MMOL/L (ref 136–145)
SP GR UR REFRACTOMETRY: 1.02 (ref 1–1.03)
SPECIMEN EXP DATE BLD: NORMAL
SPECIMEN EXP DATE BLD: NORMAL
SPECIMEN TYPE: ABNORMAL
STATUS OF UNIT,%ST: NORMAL
STATUS OF UNIT,%ST: NORMAL
T3FREE SERPL-MCNC: 1 PG/ML (ref 2.18–3.98)
T4 FREE SERPL-MCNC: 1.5 NG/DL (ref 0.7–1.5)
TOTAL RESP. RATE, ITRR: 15
TOTAL RESP. RATE, ITRR: 20
TOTAL RESP. RATE, ITRR: 20
TROPONIN I SERPL-MCNC: 0.03 NG/ML (ref 0–0.04)
TROPONIN I SERPL-MCNC: 0.07 NG/ML (ref 0–0.04)
TROPONIN I SERPL-MCNC: 0.07 NG/ML (ref 0–0.04)
TROPONIN I SERPL-MCNC: 0.1 NG/ML (ref 0–0.04)
TSH SERPL DL<=0.05 MIU/L-ACNC: 4.59 UIU/ML (ref 0.36–3.74)
UNIT DIVISION, %UDIV: 0
UNIT DIVISION, %UDIV: 0
UROBILINOGEN UR QL STRIP.AUTO: 0.2 EU/DL (ref 0.2–1)
VANCOMYCIN SERPL-MCNC: 20.6 UG/ML (ref 5–40)
VANCOMYCIN SERPL-MCNC: 22.3 UG/ML (ref 5–40)
VANCOMYCIN SERPL-MCNC: 26.6 UG/ML (ref 5–40)
VANCOMYCIN SERPL-MCNC: 29.7 UG/ML (ref 5–40)
VANCOMYCIN TROUGH SERPL-MCNC: 19.5 UG/ML (ref 10–20)
VENTRICULAR RATE, ECG03: 59 BPM
VENTRICULAR RATE, ECG03: 60 BPM
WBC # BLD AUTO: 11.2 K/UL (ref 4.6–13.2)
WBC # BLD AUTO: 11.4 K/UL (ref 4.6–13.2)
WBC # BLD AUTO: 13 K/UL (ref 4.6–13.2)
WBC # BLD AUTO: 13.9 K/UL (ref 4.6–13.2)
WBC # BLD AUTO: 14 K/UL (ref 4.6–13.2)
WBC # BLD AUTO: 14.5 K/UL (ref 4.6–13.2)
WBC # BLD AUTO: 15.2 K/UL (ref 4.6–13.2)
WBC # BLD AUTO: 17.2 K/UL (ref 4.6–13.2)
WBC # BLD AUTO: 19.6 K/UL (ref 4.6–13.2)
WBC # BLD AUTO: 20.2 K/UL (ref 4.6–13.2)
WBC # BLD AUTO: 20.8 K/UL (ref 4.6–13.2)
WBC # BLD AUTO: 21 K/UL (ref 4.6–13.2)
WBC URNS QL MICRO: ABNORMAL /HPF (ref 0–4)

## 2019-01-01 PROCEDURE — 74011250637 HC RX REV CODE- 250/637: Performed by: HOSPITALIST

## 2019-01-01 PROCEDURE — 77010033678 HC OXYGEN DAILY

## 2019-01-01 PROCEDURE — 74011636637 HC RX REV CODE- 636/637: Performed by: HOSPITALIST

## 2019-01-01 PROCEDURE — 3336500001 HSPC ELECTION

## 2019-01-01 PROCEDURE — C9113 INJ PANTOPRAZOLE SODIUM, VIA: HCPCS | Performed by: NURSE PRACTITIONER

## 2019-01-01 PROCEDURE — 94669 MECHANICAL CHEST WALL OSCILL: CPT

## 2019-01-01 PROCEDURE — 74011250637 HC RX REV CODE- 250/637: Performed by: PHYSICIAN ASSISTANT

## 2019-01-01 PROCEDURE — 77030020186 HC BOOT HL PROTCT SAGE -B

## 2019-01-01 PROCEDURE — 51702 INSERT TEMP BLADDER CATH: CPT

## 2019-01-01 PROCEDURE — 74011250636 HC RX REV CODE- 250/636: Performed by: HOSPITALIST

## 2019-01-01 PROCEDURE — 85025 COMPLETE CBC W/AUTO DIFF WBC: CPT

## 2019-01-01 PROCEDURE — 97530 THERAPEUTIC ACTIVITIES: CPT

## 2019-01-01 PROCEDURE — 74011250636 HC RX REV CODE- 250/636: Performed by: EMERGENCY MEDICINE

## 2019-01-01 PROCEDURE — C9113 INJ PANTOPRAZOLE SODIUM, VIA: HCPCS | Performed by: INTERNAL MEDICINE

## 2019-01-01 PROCEDURE — 65660000000 HC RM CCU STEPDOWN

## 2019-01-01 PROCEDURE — 85730 THROMBOPLASTIN TIME PARTIAL: CPT

## 2019-01-01 PROCEDURE — P9047 ALBUMIN (HUMAN), 25%, 50ML: HCPCS | Performed by: INTERNAL MEDICINE

## 2019-01-01 PROCEDURE — 74011000258 HC RX REV CODE- 258: Performed by: PHYSICIAN ASSISTANT

## 2019-01-01 PROCEDURE — 36430 TRANSFUSION BLD/BLD COMPNT: CPT

## 2019-01-01 PROCEDURE — 84439 ASSAY OF FREE THYROXINE: CPT

## 2019-01-01 PROCEDURE — 74011000250 HC RX REV CODE- 250: Performed by: INTERNAL MEDICINE

## 2019-01-01 PROCEDURE — 74011250636 HC RX REV CODE- 250/636: Performed by: INTERNAL MEDICINE

## 2019-01-01 PROCEDURE — 74011000258 HC RX REV CODE- 258: Performed by: HOSPITALIST

## 2019-01-01 PROCEDURE — 82962 GLUCOSE BLOOD TEST: CPT

## 2019-01-01 PROCEDURE — 77030037878 HC DRSG MEPILEX >48IN BORD MOLN -B

## 2019-01-01 PROCEDURE — 74011000250 HC RX REV CODE- 250: Performed by: PHYSICIAN ASSISTANT

## 2019-01-01 PROCEDURE — 36415 COLL VENOUS BLD VENIPUNCTURE: CPT

## 2019-01-01 PROCEDURE — 93306 TTE W/DOPPLER COMPLETE: CPT

## 2019-01-01 PROCEDURE — 94762 N-INVAS EAR/PLS OXIMTRY CONT: CPT

## 2019-01-01 PROCEDURE — 80202 ASSAY OF VANCOMYCIN: CPT

## 2019-01-01 PROCEDURE — 74011000258 HC RX REV CODE- 258: Performed by: NURSE PRACTITIONER

## 2019-01-01 PROCEDURE — 87086 URINE CULTURE/COLONY COUNT: CPT

## 2019-01-01 PROCEDURE — 74011636637 HC RX REV CODE- 636/637: Performed by: INTERNAL MEDICINE

## 2019-01-01 PROCEDURE — 74011250636 HC RX REV CODE- 250/636: Performed by: FAMILY MEDICINE

## 2019-01-01 PROCEDURE — 77030008771 HC TU NG SALEM SUMP -A

## 2019-01-01 PROCEDURE — 74011250637 HC RX REV CODE- 250/637: Performed by: INTERNAL MEDICINE

## 2019-01-01 PROCEDURE — 92526 ORAL FUNCTION THERAPY: CPT

## 2019-01-01 PROCEDURE — 74011250636 HC RX REV CODE- 250/636: Performed by: PHYSICIAN ASSISTANT

## 2019-01-01 PROCEDURE — 97110 THERAPEUTIC EXERCISES: CPT

## 2019-01-01 PROCEDURE — 80076 HEPATIC FUNCTION PANEL: CPT

## 2019-01-01 PROCEDURE — T4541 LARGE DISPOSABLE UNDERPAD: HCPCS

## 2019-01-01 PROCEDURE — 0651 HSPC ROUTINE HOME CARE

## 2019-01-01 PROCEDURE — 74011000258 HC RX REV CODE- 258: Performed by: EMERGENCY MEDICINE

## 2019-01-01 PROCEDURE — 74011250636 HC RX REV CODE- 250/636

## 2019-01-01 PROCEDURE — 94660 CPAP INITIATION&MGMT: CPT

## 2019-01-01 PROCEDURE — 94761 N-INVAS EAR/PLS OXIMETRY MLT: CPT

## 2019-01-01 PROCEDURE — C1751 CATH, INF, PER/CENT/MIDLINE: HCPCS

## 2019-01-01 PROCEDURE — 77030018798 HC PMP KT ENTRL FED COVD -A

## 2019-01-01 PROCEDURE — 84132 ASSAY OF SERUM POTASSIUM: CPT

## 2019-01-01 PROCEDURE — 77030037877 HC DRSG MEPILEX >48IN BORD MOLN -A

## 2019-01-01 PROCEDURE — 65610000006 HC RM INTENSIVE CARE

## 2019-01-01 PROCEDURE — 86900 BLOOD TYPING SEROLOGIC ABO: CPT

## 2019-01-01 PROCEDURE — 84681 ASSAY OF C-PEPTIDE: CPT

## 2019-01-01 PROCEDURE — 97112 NEUROMUSCULAR REEDUCATION: CPT

## 2019-01-01 PROCEDURE — 74011000258 HC RX REV CODE- 258: Performed by: INTERNAL MEDICINE

## 2019-01-01 PROCEDURE — 74011250636 HC RX REV CODE- 250/636: Performed by: NURSE PRACTITIONER

## 2019-01-01 PROCEDURE — 87040 BLOOD CULTURE FOR BACTERIA: CPT

## 2019-01-01 PROCEDURE — 74011000250 HC RX REV CODE- 250: Performed by: NURSE PRACTITIONER

## 2019-01-01 PROCEDURE — 74018 RADEX ABDOMEN 1 VIEW: CPT

## 2019-01-01 PROCEDURE — A6216 NON-STERILE GAUZE<=16 SQ IN: HCPCS

## 2019-01-01 PROCEDURE — 92611 MOTION FLUOROSCOPY/SWALLOW: CPT

## 2019-01-01 PROCEDURE — 80069 RENAL FUNCTION PANEL: CPT

## 2019-01-01 PROCEDURE — 97535 SELF CARE MNGMENT TRAINING: CPT

## 2019-01-01 PROCEDURE — 94760 N-INVAS EAR/PLS OXIMETRY 1: CPT

## 2019-01-01 PROCEDURE — 80048 BASIC METABOLIC PNL TOTAL CA: CPT

## 2019-01-01 PROCEDURE — 86923 COMPATIBILITY TEST ELECTRIC: CPT

## 2019-01-01 PROCEDURE — 80047 BASIC METABLC PNL IONIZED CA: CPT

## 2019-01-01 PROCEDURE — 84156 ASSAY OF PROTEIN URINE: CPT

## 2019-01-01 PROCEDURE — 02HV33Z INSERTION OF INFUSION DEVICE INTO SUPERIOR VENA CAVA, PERCUTANEOUS APPROACH: ICD-10-PCS | Performed by: NURSE PRACTITIONER

## 2019-01-01 PROCEDURE — 84481 FREE ASSAY (FT-3): CPT

## 2019-01-01 PROCEDURE — 74011250637 HC RX REV CODE- 250/637: Performed by: NURSE PRACTITIONER

## 2019-01-01 PROCEDURE — 82803 BLOOD GASES ANY COMBINATION: CPT

## 2019-01-01 PROCEDURE — HHS10554 SHAMPOO/BODY WASH 8 OZ ALOE VESTA

## 2019-01-01 PROCEDURE — A4450 NON-WATERPROOF TAPE: HCPCS

## 2019-01-01 PROCEDURE — 93880 EXTRACRANIAL BILAT STUDY: CPT

## 2019-01-01 PROCEDURE — 83036 HEMOGLOBIN GLYCOSYLATED A1C: CPT

## 2019-01-01 PROCEDURE — 36600 WITHDRAWAL OF ARTERIAL BLOOD: CPT

## 2019-01-01 PROCEDURE — 93005 ELECTROCARDIOGRAM TRACING: CPT

## 2019-01-01 PROCEDURE — 36556 INSERT NON-TUNNEL CV CATH: CPT

## 2019-01-01 PROCEDURE — 81001 URINALYSIS AUTO W/SCOPE: CPT

## 2019-01-01 PROCEDURE — 74011250637 HC RX REV CODE- 250/637: Performed by: EMERGENCY MEDICINE

## 2019-01-01 PROCEDURE — 74011636637 HC RX REV CODE- 636/637: Performed by: PHYSICIAN ASSISTANT

## 2019-01-01 PROCEDURE — 80053 COMPREHEN METABOLIC PANEL: CPT

## 2019-01-01 PROCEDURE — 83605 ASSAY OF LACTIC ACID: CPT

## 2019-01-01 PROCEDURE — 36591 DRAW BLOOD OFF VENOUS DEVICE: CPT

## 2019-01-01 PROCEDURE — 74011000258 HC RX REV CODE- 258: Performed by: FAMILY MEDICINE

## 2019-01-01 PROCEDURE — A4927 NON-STERILE GLOVES: HCPCS

## 2019-01-01 PROCEDURE — A6260 WOUND CLEANSER ANY TYPE/SIZE: HCPCS

## 2019-01-01 PROCEDURE — 77030005514 HC CATH URETH FOL14 BARD -A

## 2019-01-01 PROCEDURE — 83880 ASSAY OF NATRIURETIC PEPTIDE: CPT

## 2019-01-01 PROCEDURE — 77030035694 HC MSK BIPAP FLL FAC PERFMAX PHIL -B

## 2019-01-01 PROCEDURE — HOSPICE MEDICATION HC HH HOSPICE MEDICATION

## 2019-01-01 PROCEDURE — A9270 NON-COVERED ITEM OR SERVICE: HCPCS

## 2019-01-01 PROCEDURE — 93925 LOWER EXTREMITY STUDY: CPT

## 2019-01-01 PROCEDURE — 92610 EVALUATE SWALLOWING FUNCTION: CPT

## 2019-01-01 PROCEDURE — 82550 ASSAY OF CK (CPK): CPT

## 2019-01-01 PROCEDURE — 77030037870 HC GLD SHT PREVALON SAGE -B

## 2019-01-01 PROCEDURE — P9016 RBC LEUKOCYTES REDUCED: HCPCS

## 2019-01-01 PROCEDURE — 97168 OT RE-EVAL EST PLAN CARE: CPT

## 2019-01-01 PROCEDURE — G0299 HHS/HOSPICE OF RN EA 15 MIN: HCPCS

## 2019-01-01 PROCEDURE — P9045 ALBUMIN (HUMAN), 5%, 250 ML: HCPCS | Performed by: PHYSICIAN ASSISTANT

## 2019-01-01 PROCEDURE — 77030018846 HC SOL IRR STRL H20 ICUM -A

## 2019-01-01 PROCEDURE — 74011000250 HC RX REV CODE- 250: Performed by: HOSPITALIST

## 2019-01-01 PROCEDURE — A6250 SKIN SEAL PROTECT MOISTURIZR: HCPCS

## 2019-01-01 PROCEDURE — 30233N1 TRANSFUSION OF NONAUTOLOGOUS RED BLOOD CELLS INTO PERIPHERAL VEIN, PERCUTANEOUS APPROACH: ICD-10-PCS | Performed by: HOSPITALIST

## 2019-01-01 PROCEDURE — 77030011256 HC DRSG MEPILEX <16IN NO BORD MOLN -A

## 2019-01-01 PROCEDURE — 74011000250 HC RX REV CODE- 250: Performed by: EMERGENCY MEDICINE

## 2019-01-01 PROCEDURE — 97167 OT EVAL HIGH COMPLEX 60 MIN: CPT

## 2019-01-01 PROCEDURE — 74011000255 HC RX REV CODE- 255: Performed by: HOSPITALIST

## 2019-01-01 PROCEDURE — 96365 THER/PROPH/DIAG IV INF INIT: CPT

## 2019-01-01 PROCEDURE — 71045 X-RAY EXAM CHEST 1 VIEW: CPT

## 2019-01-01 PROCEDURE — 73552 X-RAY EXAM OF FEMUR 2/>: CPT

## 2019-01-01 PROCEDURE — 5A09457 ASSISTANCE WITH RESPIRATORY VENTILATION, 24-96 CONSECUTIVE HOURS, CONTINUOUS POSITIVE AIRWAY PRESSURE: ICD-10-PCS | Performed by: EMERGENCY MEDICINE

## 2019-01-01 PROCEDURE — P9047 ALBUMIN (HUMAN), 25%, 50ML: HCPCS | Performed by: PHYSICIAN ASSISTANT

## 2019-01-01 PROCEDURE — 99285 EMERGENCY DEPT VISIT HI MDM: CPT

## 2019-01-01 PROCEDURE — T4524 ADULT SIZE BRIEF/DIAPER XL: HCPCS

## 2019-01-01 PROCEDURE — 72170 X-RAY EXAM OF PELVIS: CPT

## 2019-01-01 PROCEDURE — 74230 X-RAY XM SWLNG FUNCJ C+: CPT

## 2019-01-01 PROCEDURE — 77030011251 HC DRSG HYDRCOIL S&N -A

## 2019-01-01 PROCEDURE — 87186 SC STD MICRODIL/AGAR DIL: CPT

## 2019-01-01 PROCEDURE — 97164 PT RE-EVAL EST PLAN CARE: CPT

## 2019-01-01 PROCEDURE — 87077 CULTURE AEROBIC IDENTIFY: CPT

## 2019-01-01 PROCEDURE — 3331090004 HSPC SERVICE INTENSITY ADD-ON

## 2019-01-01 PROCEDURE — 77030034849

## 2019-01-01 PROCEDURE — 96375 TX/PRO/DX INJ NEW DRUG ADDON: CPT

## 2019-01-01 PROCEDURE — A6446 CONFORM BAND S W>=3" <5"/YD: HCPCS

## 2019-01-01 PROCEDURE — 84443 ASSAY THYROID STIM HORMONE: CPT

## 2019-01-01 PROCEDURE — 97162 PT EVAL MOD COMPLEX 30 MIN: CPT

## 2019-01-01 PROCEDURE — 70450 CT HEAD/BRAIN W/O DYE: CPT

## 2019-01-01 RX ORDER — POTASSIUM CHLORIDE 1.5 G/1.77G
40 POWDER, FOR SOLUTION ORAL 2 TIMES DAILY WITH MEALS
Status: DISCONTINUED | OUTPATIENT
Start: 2019-01-01 | End: 2019-01-01

## 2019-01-01 RX ORDER — VANCOMYCIN 2 GRAM/500 ML IN 0.9 % SODIUM CHLORIDE INTRAVENOUS
2000 ONCE
Status: COMPLETED | OUTPATIENT
Start: 2019-01-01 | End: 2019-01-01

## 2019-01-01 RX ORDER — ALBUMIN HUMAN 250 G/1000ML
25 SOLUTION INTRAVENOUS ONCE
Status: COMPLETED | OUTPATIENT
Start: 2019-01-01 | End: 2019-01-01

## 2019-01-01 RX ORDER — POTASSIUM CHLORIDE 7.45 MG/ML
10 INJECTION INTRAVENOUS
Status: COMPLETED | OUTPATIENT
Start: 2019-01-01 | End: 2019-01-01

## 2019-01-01 RX ORDER — MORPHINE SULFATE 2 MG/ML
1 INJECTION, SOLUTION INTRAMUSCULAR; INTRAVENOUS
Status: DISCONTINUED | OUTPATIENT
Start: 2019-01-01 | End: 2019-01-01

## 2019-01-01 RX ORDER — DEXTROSE MONOHYDRATE 100 MG/ML
50 INJECTION, SOLUTION INTRAVENOUS CONTINUOUS
Status: DISCONTINUED | OUTPATIENT
Start: 2019-01-01 | End: 2019-01-01

## 2019-01-01 RX ORDER — FUROSEMIDE 20 MG/1
TABLET ORAL
Qty: 30 TAB | Refills: 2 | Status: SHIPPED | OUTPATIENT
Start: 2019-01-01

## 2019-01-01 RX ORDER — POTASSIUM CHLORIDE 20 MEQ/1
20 TABLET, EXTENDED RELEASE ORAL
Status: DISCONTINUED | OUTPATIENT
Start: 2019-01-01 | End: 2019-01-01

## 2019-01-01 RX ORDER — POTASSIUM CHLORIDE 29.8 MG/ML
20 INJECTION INTRAVENOUS
Status: DISCONTINUED | OUTPATIENT
Start: 2019-01-01 | End: 2019-01-01

## 2019-01-01 RX ORDER — SODIUM CHLORIDE 9 MG/ML
250 INJECTION, SOLUTION INTRAVENOUS AS NEEDED
Status: DISCONTINUED | OUTPATIENT
Start: 2019-01-01 | End: 2019-01-01

## 2019-01-01 RX ORDER — HEPARIN SODIUM 1000 [USP'U]/ML
80 INJECTION, SOLUTION INTRAVENOUS; SUBCUTANEOUS ONCE
Status: COMPLETED | OUTPATIENT
Start: 2019-01-01 | End: 2019-01-01

## 2019-01-01 RX ORDER — BUMETANIDE 0.25 MG/ML
1 INJECTION INTRAMUSCULAR; INTRAVENOUS EVERY 6 HOURS
Status: COMPLETED | OUTPATIENT
Start: 2019-01-01 | End: 2019-01-01

## 2019-01-01 RX ORDER — COLESEVELAM 180 1/1
TABLET ORAL
Qty: 180 TAB | Refills: 3 | Status: SHIPPED | OUTPATIENT
Start: 2019-01-01 | End: 2019-01-01

## 2019-01-01 RX ORDER — BUMETANIDE 0.25 MG/ML
1 INJECTION INTRAMUSCULAR; INTRAVENOUS 2 TIMES DAILY
Status: DISCONTINUED | OUTPATIENT
Start: 2019-01-01 | End: 2019-01-01

## 2019-01-01 RX ORDER — BUMETANIDE 0.25 MG/ML
INJECTION INTRAMUSCULAR; INTRAVENOUS
Status: DISPENSED
Start: 2019-01-01 | End: 2019-01-01

## 2019-01-01 RX ORDER — VANCOMYCIN/0.9 % SOD CHLORIDE 1.5G/250ML
1500 PLASTIC BAG, INJECTION (ML) INTRAVENOUS EVERY 24 HOURS
Status: DISCONTINUED | OUTPATIENT
Start: 2019-01-01 | End: 2019-01-01

## 2019-01-01 RX ORDER — HEPARIN SODIUM 1000 [USP'U]/ML
40 INJECTION, SOLUTION INTRAVENOUS; SUBCUTANEOUS ONCE
Status: COMPLETED | OUTPATIENT
Start: 2019-01-01 | End: 2019-01-01

## 2019-01-01 RX ORDER — MAGNESIUM SULFATE 100 %
4 CRYSTALS MISCELLANEOUS AS NEEDED
Status: DISCONTINUED | OUTPATIENT
Start: 2019-01-01 | End: 2019-01-01

## 2019-01-01 RX ORDER — FUROSEMIDE 10 MG/ML
40 INJECTION INTRAMUSCULAR; INTRAVENOUS
Status: COMPLETED | OUTPATIENT
Start: 2019-01-01 | End: 2019-01-01

## 2019-01-01 RX ORDER — POTASSIUM CHLORIDE 29.8 MG/ML
20 INJECTION INTRAVENOUS ONCE
Status: COMPLETED | OUTPATIENT
Start: 2019-01-01 | End: 2019-01-01

## 2019-01-01 RX ORDER — FUROSEMIDE 10 MG/ML
INJECTION INTRAMUSCULAR; INTRAVENOUS
Status: DISPENSED
Start: 2019-01-01 | End: 2019-01-01

## 2019-01-01 RX ORDER — POTASSIUM CHLORIDE 750 MG/1
10 TABLET, FILM COATED, EXTENDED RELEASE ORAL
Status: DISCONTINUED | OUTPATIENT
Start: 2019-01-01 | End: 2019-01-01

## 2019-01-01 RX ORDER — AMOXICILLIN 250 MG
2 CAPSULE ORAL
Qty: 30 TAB | Refills: 0 | Status: SHIPPED | OUTPATIENT
Start: 2019-01-01

## 2019-01-01 RX ORDER — FUROSEMIDE 10 MG/ML
40 INJECTION INTRAMUSCULAR; INTRAVENOUS 2 TIMES DAILY
Status: DISCONTINUED | OUTPATIENT
Start: 2019-01-01 | End: 2019-01-01

## 2019-01-01 RX ORDER — POTASSIUM CHLORIDE 7.45 MG/ML
INJECTION INTRAVENOUS
Status: DISPENSED
Start: 2019-01-01 | End: 2019-01-01

## 2019-01-01 RX ORDER — ALBUMIN HUMAN 250 G/1000ML
25 SOLUTION INTRAVENOUS EVERY 6 HOURS
Status: COMPLETED | OUTPATIENT
Start: 2019-01-01 | End: 2019-01-01

## 2019-01-01 RX ORDER — LABETALOL HCL 20 MG/4 ML
10 SYRINGE (ML) INTRAVENOUS
Status: COMPLETED | OUTPATIENT
Start: 2019-01-01 | End: 2019-01-01

## 2019-01-01 RX ORDER — INSULIN GLARGINE 100 [IU]/ML
7 INJECTION, SOLUTION SUBCUTANEOUS DAILY
Status: DISCONTINUED | OUTPATIENT
Start: 2019-01-01 | End: 2019-01-01

## 2019-01-01 RX ORDER — MORPHINE SULFATE 2 MG/ML
INJECTION, SOLUTION INTRAMUSCULAR; INTRAVENOUS
Status: DISCONTINUED
Start: 2019-01-01 | End: 2019-01-01

## 2019-01-01 RX ORDER — MORPHINE SULFATE 4 MG/ML
10 INJECTION INTRAVENOUS
Status: DISCONTINUED | OUTPATIENT
Start: 2019-01-01 | End: 2019-01-01

## 2019-01-01 RX ORDER — DEXTROSE MONOHYDRATE AND SODIUM CHLORIDE 5; .9 G/100ML; G/100ML
100 INJECTION, SOLUTION INTRAVENOUS CONTINUOUS
Status: DISCONTINUED | OUTPATIENT
Start: 2019-01-01 | End: 2019-01-01

## 2019-01-01 RX ORDER — LORAZEPAM 2 MG/ML
1 CONCENTRATE ORAL
Qty: 30 ML | Refills: 0 | Status: SHIPPED | OUTPATIENT
Start: 2019-01-01

## 2019-01-01 RX ORDER — PANTOPRAZOLE SODIUM 40 MG/10ML
40 INJECTION, POWDER, LYOPHILIZED, FOR SOLUTION INTRAVENOUS DAILY
Status: DISCONTINUED | OUTPATIENT
Start: 2019-01-01 | End: 2019-01-01

## 2019-01-01 RX ORDER — LEVOFLOXACIN 5 MG/ML
750 INJECTION, SOLUTION INTRAVENOUS EVERY 24 HOURS
Status: DISCONTINUED | OUTPATIENT
Start: 2019-01-01 | End: 2019-01-01

## 2019-01-01 RX ORDER — PIPERACILLIN SODIUM, TAZOBACTAM SODIUM 4; .5 G/20ML; G/20ML
INJECTION, POWDER, LYOPHILIZED, FOR SOLUTION INTRAVENOUS
Status: DISPENSED
Start: 2019-01-01 | End: 2019-01-01

## 2019-01-01 RX ORDER — METOLAZONE 5 MG/1
10 TABLET ORAL ONCE
Status: COMPLETED | OUTPATIENT
Start: 2019-01-01 | End: 2019-01-01

## 2019-01-01 RX ORDER — PANTOPRAZOLE SODIUM 40 MG/1
40 TABLET, DELAYED RELEASE ORAL
Status: DISCONTINUED | OUTPATIENT
Start: 2019-01-01 | End: 2019-01-01

## 2019-01-01 RX ORDER — SODIUM CHLORIDE, SODIUM LACTATE, POTASSIUM CHLORIDE, CALCIUM CHLORIDE 600; 310; 30; 20 MG/100ML; MG/100ML; MG/100ML; MG/100ML
500 INJECTION, SOLUTION INTRAVENOUS ONCE
Status: COMPLETED | OUTPATIENT
Start: 2019-01-01 | End: 2019-01-01

## 2019-01-01 RX ORDER — MORPHINE SULFATE 20 MG/ML
10 SOLUTION ORAL
Status: DISCONTINUED | OUTPATIENT
Start: 2019-01-01 | End: 2019-01-01 | Stop reason: HOSPADM

## 2019-01-01 RX ORDER — HEPARIN SODIUM 1000 [USP'U]/ML
40 INJECTION, SOLUTION INTRAVENOUS; SUBCUTANEOUS
Status: COMPLETED | OUTPATIENT
Start: 2019-01-01 | End: 2019-01-01

## 2019-01-01 RX ORDER — HYDRALAZINE HYDROCHLORIDE 20 MG/ML
20 INJECTION INTRAMUSCULAR; INTRAVENOUS
Status: DISCONTINUED | OUTPATIENT
Start: 2019-01-01 | End: 2019-01-01

## 2019-01-01 RX ORDER — POLYETHYLENE GLYCOL 3350 17 G/17G
17 POWDER, FOR SOLUTION ORAL DAILY
Status: DISCONTINUED | OUTPATIENT
Start: 2019-01-01 | End: 2019-01-01

## 2019-01-01 RX ORDER — SCOLOPAMINE TRANSDERMAL SYSTEM 1 MG/1
1 PATCH, EXTENDED RELEASE TRANSDERMAL
Qty: 10 PATCH | Refills: 0 | Status: SHIPPED | OUTPATIENT
Start: 2019-01-01

## 2019-01-01 RX ORDER — KETOROLAC TROMETHAMINE 30 MG/ML
15 INJECTION, SOLUTION INTRAMUSCULAR; INTRAVENOUS
Status: COMPLETED | OUTPATIENT
Start: 2019-01-01 | End: 2019-01-01

## 2019-01-01 RX ORDER — DEXTROSE MONOHYDRATE 50 MG/ML
25 INJECTION, SOLUTION INTRAVENOUS CONTINUOUS
Status: DISCONTINUED | OUTPATIENT
Start: 2019-01-01 | End: 2019-01-01

## 2019-01-01 RX ORDER — ZOLPIDEM TARTRATE 5 MG/1
5 TABLET ORAL
Status: DISCONTINUED | OUTPATIENT
Start: 2019-01-01 | End: 2019-01-01

## 2019-01-01 RX ORDER — POTASSIUM CHLORIDE 20 MEQ/1
20 TABLET, EXTENDED RELEASE ORAL 2 TIMES DAILY
Status: DISCONTINUED | OUTPATIENT
Start: 2019-01-01 | End: 2019-01-01

## 2019-01-01 RX ORDER — INSULIN GLARGINE 100 [IU]/ML
9 INJECTION, SOLUTION SUBCUTANEOUS DAILY
Status: DISCONTINUED | OUTPATIENT
Start: 2019-01-01 | End: 2019-01-01

## 2019-01-01 RX ORDER — HEPARIN SODIUM 1000 [USP'U]/ML
INJECTION, SOLUTION INTRAVENOUS; SUBCUTANEOUS
Status: COMPLETED
Start: 2019-01-01 | End: 2019-01-01

## 2019-01-01 RX ORDER — LORAZEPAM 2 MG/ML
1 CONCENTRATE ORAL
Status: DISCONTINUED | OUTPATIENT
Start: 2019-01-01 | End: 2019-01-01 | Stop reason: HOSPADM

## 2019-01-01 RX ORDER — LEVOFLOXACIN 5 MG/ML
750 INJECTION, SOLUTION INTRAVENOUS
Status: DISCONTINUED | OUTPATIENT
Start: 2019-01-01 | End: 2019-01-01

## 2019-01-01 RX ORDER — SCOLOPAMINE TRANSDERMAL SYSTEM 1 MG/1
1 PATCH, EXTENDED RELEASE TRANSDERMAL
Status: DISCONTINUED | OUTPATIENT
Start: 2019-01-01 | End: 2019-01-01 | Stop reason: HOSPADM

## 2019-01-01 RX ORDER — ACETAMINOPHEN 325 MG/1
650 TABLET ORAL
Status: DISCONTINUED | OUTPATIENT
Start: 2019-01-01 | End: 2019-01-01

## 2019-01-01 RX ORDER — DEXTROSE, SODIUM CHLORIDE, AND POTASSIUM CHLORIDE 5; .45; .15 G/100ML; G/100ML; G/100ML
100 INJECTION INTRAVENOUS CONTINUOUS
Status: DISCONTINUED | OUTPATIENT
Start: 2019-01-01 | End: 2019-01-01

## 2019-01-01 RX ORDER — HYDROCORTISONE SODIUM SUCCINATE 100 MG/2ML
100 INJECTION, POWDER, FOR SOLUTION INTRAMUSCULAR; INTRAVENOUS 3 TIMES DAILY
Status: COMPLETED | OUTPATIENT
Start: 2019-01-01 | End: 2019-01-01

## 2019-01-01 RX ORDER — MORPHINE SULFATE 2 MG/ML
1-2 INJECTION, SOLUTION INTRAMUSCULAR; INTRAVENOUS
Status: DISCONTINUED | OUTPATIENT
Start: 2019-01-01 | End: 2019-01-01

## 2019-01-01 RX ORDER — INSULIN LISPRO 100 [IU]/ML
INJECTION, SOLUTION INTRAVENOUS; SUBCUTANEOUS EVERY 6 HOURS
Status: DISCONTINUED | OUTPATIENT
Start: 2019-01-01 | End: 2019-01-01

## 2019-01-01 RX ORDER — AMLODIPINE BESYLATE 10 MG/1
10 TABLET ORAL DAILY
Status: DISCONTINUED | OUTPATIENT
Start: 2019-01-01 | End: 2019-01-01

## 2019-01-01 RX ORDER — INSULIN GLARGINE 100 [IU]/ML
12 INJECTION, SOLUTION SUBCUTANEOUS DAILY
Status: DISCONTINUED | OUTPATIENT
Start: 2019-01-01 | End: 2019-01-01

## 2019-01-01 RX ORDER — INSULIN GLARGINE 100 [IU]/ML
10 INJECTION, SOLUTION SUBCUTANEOUS DAILY
Status: DISCONTINUED | OUTPATIENT
Start: 2019-01-01 | End: 2019-01-01

## 2019-01-01 RX ORDER — DEXTROSE, SODIUM CHLORIDE, SODIUM LACTATE, POTASSIUM CHLORIDE, AND CALCIUM CHLORIDE 5; .6; .31; .03; .02 G/100ML; G/100ML; G/100ML; G/100ML; G/100ML
25 INJECTION, SOLUTION INTRAVENOUS CONTINUOUS
Status: DISCONTINUED | OUTPATIENT
Start: 2019-01-01 | End: 2019-01-01

## 2019-01-01 RX ORDER — HEPARIN SODIUM 10000 [USP'U]/100ML
18-36 INJECTION, SOLUTION INTRAVENOUS
Status: DISCONTINUED | OUTPATIENT
Start: 2019-01-01 | End: 2019-01-01

## 2019-01-01 RX ORDER — LEVOTHYROXINE SODIUM 112 UG/1
112 TABLET ORAL
Status: DISCONTINUED | OUTPATIENT
Start: 2019-01-01 | End: 2019-01-01

## 2019-01-01 RX ORDER — DEXTROSE, SODIUM CHLORIDE, SODIUM LACTATE, POTASSIUM CHLORIDE, AND CALCIUM CHLORIDE 5; .6; .31; .03; .02 G/100ML; G/100ML; G/100ML; G/100ML; G/100ML
100 INJECTION, SOLUTION INTRAVENOUS CONTINUOUS
Status: DISCONTINUED | OUTPATIENT
Start: 2019-01-01 | End: 2019-01-01

## 2019-01-01 RX ORDER — SODIUM,POTASSIUM PHOSPHATES 280-250MG
1 POWDER IN PACKET (EA) ORAL
Status: COMPLETED | OUTPATIENT
Start: 2019-01-01 | End: 2019-01-01

## 2019-01-01 RX ORDER — BUMETANIDE 0.25 MG/ML
2 INJECTION INTRAMUSCULAR; INTRAVENOUS 2 TIMES DAILY
Status: DISCONTINUED | OUTPATIENT
Start: 2019-01-01 | End: 2019-01-01

## 2019-01-01 RX ORDER — METOPROLOL TARTRATE 5 MG/5ML
5 INJECTION INTRAVENOUS EVERY 6 HOURS
Status: DISCONTINUED | OUTPATIENT
Start: 2019-01-01 | End: 2019-01-01

## 2019-01-01 RX ORDER — BUMETANIDE 0.25 MG/ML
2 INJECTION INTRAMUSCULAR; INTRAVENOUS ONCE
Status: COMPLETED | OUTPATIENT
Start: 2019-01-01 | End: 2019-01-01

## 2019-01-01 RX ORDER — METOPROLOL TARTRATE 25 MG/1
25 TABLET, FILM COATED ORAL 2 TIMES DAILY
Status: DISCONTINUED | OUTPATIENT
Start: 2019-01-01 | End: 2019-01-01

## 2019-01-01 RX ORDER — DEXTROSE MONOHYDRATE 100 MG/ML
125-150 INJECTION, SOLUTION INTRAVENOUS AS NEEDED
Status: DISCONTINUED | OUTPATIENT
Start: 2019-01-01 | End: 2019-01-01

## 2019-01-01 RX ORDER — TRAMADOL HYDROCHLORIDE 50 MG/1
50 TABLET ORAL
Status: DISCONTINUED | OUTPATIENT
Start: 2019-01-01 | End: 2019-01-01

## 2019-01-01 RX ORDER — DICYCLOMINE HYDROCHLORIDE 10 MG/1
10 CAPSULE ORAL ONCE
Status: DISCONTINUED | OUTPATIENT
Start: 2019-01-01 | End: 2019-01-01 | Stop reason: RX

## 2019-01-01 RX ORDER — POTASSIUM CHLORIDE 20 MEQ/1
40 TABLET, EXTENDED RELEASE ORAL 2 TIMES DAILY
Status: DISCONTINUED | OUTPATIENT
Start: 2019-01-01 | End: 2019-01-01

## 2019-01-01 RX ORDER — SODIUM CHLORIDE 900 MG/100ML
INJECTION INTRAVENOUS
Status: DISPENSED
Start: 2019-01-01 | End: 2019-01-01

## 2019-01-01 RX ORDER — BUMETANIDE 0.25 MG/ML
1 INJECTION INTRAMUSCULAR; INTRAVENOUS
Status: COMPLETED | OUTPATIENT
Start: 2019-01-01 | End: 2019-01-01

## 2019-01-01 RX ORDER — POTASSIUM CHLORIDE 29.8 MG/ML
20 INJECTION INTRAVENOUS
Status: COMPLETED | OUTPATIENT
Start: 2019-01-01 | End: 2019-01-01

## 2019-01-01 RX ORDER — METOPROLOL TARTRATE 25 MG/1
25 TABLET, FILM COATED ORAL EVERY 12 HOURS
Status: DISCONTINUED | OUTPATIENT
Start: 2019-01-01 | End: 2019-01-01

## 2019-01-01 RX ORDER — INSULIN GLARGINE 100 [IU]/ML
5 INJECTION, SOLUTION SUBCUTANEOUS DAILY
Status: DISCONTINUED | OUTPATIENT
Start: 2019-01-01 | End: 2019-01-01

## 2019-01-01 RX ORDER — MORPHINE SULFATE 20 MG/ML
10 SOLUTION ORAL
Qty: 30 ML | Refills: 0 | Status: SHIPPED | OUTPATIENT
Start: 2019-01-01 | End: 2019-06-20

## 2019-01-01 RX ORDER — INSULIN GLARGINE 100 [IU]/ML
2 INJECTION, SOLUTION SUBCUTANEOUS ONCE
Status: COMPLETED | OUTPATIENT
Start: 2019-01-01 | End: 2019-01-01

## 2019-01-01 RX ORDER — HEPARIN SODIUM 1000 [USP'U]/ML
40 INJECTION, SOLUTION INTRAVENOUS; SUBCUTANEOUS ONCE
Status: DISCONTINUED | OUTPATIENT
Start: 2019-01-01 | End: 2019-01-01

## 2019-01-01 RX ORDER — AMLODIPINE BESYLATE 5 MG/1
5 TABLET ORAL DAILY
Status: DISCONTINUED | OUTPATIENT
Start: 2019-01-01 | End: 2019-01-01

## 2019-01-01 RX ORDER — METOPROLOL TARTRATE 5 MG/5ML
5 INJECTION INTRAVENOUS
Status: DISCONTINUED | OUTPATIENT
Start: 2019-01-01 | End: 2019-01-01

## 2019-01-01 RX ORDER — SODIUM CHLORIDE 0.9 % (FLUSH) 0.9 %
5-10 SYRINGE (ML) INJECTION AS NEEDED
Status: DISCONTINUED | OUTPATIENT
Start: 2019-01-01 | End: 2019-01-01

## 2019-01-01 RX ORDER — METOPROLOL TARTRATE 50 MG/1
50 TABLET ORAL EVERY 12 HOURS
Status: DISCONTINUED | OUTPATIENT
Start: 2019-01-01 | End: 2019-01-01

## 2019-01-01 RX ORDER — POTASSIUM CHLORIDE 7.45 MG/ML
10 INJECTION INTRAVENOUS ONCE
Status: COMPLETED | OUTPATIENT
Start: 2019-01-01 | End: 2019-01-01

## 2019-01-01 RX ORDER — AMOXICILLIN 250 MG
2 CAPSULE ORAL
Status: DISCONTINUED | OUTPATIENT
Start: 2019-01-01 | End: 2019-01-01 | Stop reason: HOSPADM

## 2019-01-01 RX ORDER — OMEPRAZOLE 20 MG/1
CAPSULE, DELAYED RELEASE ORAL
Qty: 30 CAP | Refills: 4 | OUTPATIENT
Start: 2019-01-01 | End: 2019-01-01

## 2019-01-01 RX ORDER — ALBUMIN HUMAN 50 G/1000ML
12.5 SOLUTION INTRAVENOUS ONCE
Status: COMPLETED | OUTPATIENT
Start: 2019-01-01 | End: 2019-01-01

## 2019-01-01 RX ORDER — DICYCLOMINE HYDROCHLORIDE 10 MG/1
10 CAPSULE ORAL 3 TIMES DAILY
Status: DISCONTINUED | OUTPATIENT
Start: 2019-01-01 | End: 2019-01-01

## 2019-01-01 RX ORDER — BUMETANIDE 1 MG/1
1 TABLET ORAL 2 TIMES DAILY
Status: DISCONTINUED | OUTPATIENT
Start: 2019-01-01 | End: 2019-01-01

## 2019-01-01 RX ORDER — NYSTATIN 100000 [USP'U]/ML
500000 SUSPENSION ORAL 4 TIMES DAILY
Status: DISCONTINUED | OUTPATIENT
Start: 2019-01-01 | End: 2019-01-01

## 2019-01-01 RX ORDER — INSULIN LISPRO 100 [IU]/ML
INJECTION, SOLUTION INTRAVENOUS; SUBCUTANEOUS
Status: DISCONTINUED | OUTPATIENT
Start: 2019-01-01 | End: 2019-01-01

## 2019-01-01 RX ADMIN — INSULIN GLARGINE 12 UNITS: 100 INJECTION, SOLUTION SUBCUTANEOUS at 09:19

## 2019-01-01 RX ADMIN — SODIUM CHLORIDE 20 MG/HR: 900 INJECTION, SOLUTION INTRAVENOUS at 19:07

## 2019-01-01 RX ADMIN — BUMETANIDE 1 MG: 0.25 INJECTION INTRAMUSCULAR; INTRAVENOUS at 15:30

## 2019-01-01 RX ADMIN — HYDRALAZINE HYDROCHLORIDE 20 MG: 20 INJECTION INTRAMUSCULAR; INTRAVENOUS at 23:43

## 2019-01-01 RX ADMIN — LEVOTHYROXINE SODIUM 112 MCG: 112 TABLET ORAL at 07:10

## 2019-01-01 RX ADMIN — HEPARIN SODIUM AND DEXTROSE 15 UNITS/KG/HR: 10000; 5 INJECTION INTRAVENOUS at 14:26

## 2019-01-01 RX ADMIN — NYSTATIN 500000 UNITS: 100000 SUSPENSION ORAL at 19:13

## 2019-01-01 RX ADMIN — INSULIN LISPRO 3 UNITS: 100 INJECTION, SOLUTION INTRAVENOUS; SUBCUTANEOUS at 13:18

## 2019-01-01 RX ADMIN — DICYCLOMINE HYDROCHLORIDE 10 MG: 10 CAPSULE ORAL at 00:53

## 2019-01-01 RX ADMIN — NYSTATIN 500000 UNITS: 100000 SUSPENSION ORAL at 10:08

## 2019-01-01 RX ADMIN — Medication 16 G: at 09:31

## 2019-01-01 RX ADMIN — METOPROLOL TARTRATE 5 MG: 5 INJECTION INTRAVENOUS at 03:09

## 2019-01-01 RX ADMIN — HYDRALAZINE HYDROCHLORIDE 20 MG: 20 INJECTION INTRAMUSCULAR; INTRAVENOUS at 12:23

## 2019-01-01 RX ADMIN — HEPARIN SODIUM AND DEXTROSE 9 UNITS/KG/HR: 10000; 5 INJECTION INTRAVENOUS at 11:13

## 2019-01-01 RX ADMIN — HEPARIN SODIUM AND DEXTROSE 6 UNITS/KG/HR: 10000; 5 INJECTION INTRAVENOUS at 17:48

## 2019-01-01 RX ADMIN — POTASSIUM CHLORIDE 40 MEQ: 1.5 POWDER, FOR SOLUTION ORAL at 17:46

## 2019-01-01 RX ADMIN — BUMETANIDE 1 MG: 1 TABLET ORAL at 10:08

## 2019-01-01 RX ADMIN — HYDROCORTISONE SODIUM SUCCINATE 100 MG: 100 INJECTION, POWDER, FOR SOLUTION INTRAMUSCULAR; INTRAVENOUS at 21:11

## 2019-01-01 RX ADMIN — BUMETANIDE 1 MG: 1 TABLET ORAL at 11:02

## 2019-01-01 RX ADMIN — HEPARIN SODIUM 4540 UNITS: 1000 INJECTION, SOLUTION INTRAVENOUS; SUBCUTANEOUS at 13:10

## 2019-01-01 RX ADMIN — POTASSIUM CHLORIDE 10 MEQ: 7.46 INJECTION, SOLUTION INTRAVENOUS at 21:28

## 2019-01-01 RX ADMIN — MORPHINE SULFATE 2 MG: 2 INJECTION, SOLUTION INTRAMUSCULAR; INTRAVENOUS at 02:27

## 2019-01-01 RX ADMIN — METOPROLOL TARTRATE 5 MG: 5 INJECTION INTRAVENOUS at 15:00

## 2019-01-01 RX ADMIN — DEXTROSE MONOHYDRATE 250 ML: 10 INJECTION, SOLUTION INTRAVENOUS at 00:49

## 2019-01-01 RX ADMIN — DEXTROSE MONOHYDRATE 200 ML/HR: 10 INJECTION, SOLUTION INTRAVENOUS at 20:01

## 2019-01-01 RX ADMIN — POTASSIUM CHLORIDE 40 MEQ: 1.5 POWDER, FOR SOLUTION ORAL at 11:02

## 2019-01-01 RX ADMIN — POTASSIUM CHLORIDE 20 MEQ: 400 INJECTION, SOLUTION INTRAVENOUS at 19:51

## 2019-01-01 RX ADMIN — INSULIN GLARGINE 10 UNITS: 100 INJECTION, SOLUTION SUBCUTANEOUS at 12:58

## 2019-01-01 RX ADMIN — HEPARIN SODIUM AND DEXTROSE 6 UNITS/KG/HR: 10000; 5 INJECTION INTRAVENOUS at 07:34

## 2019-01-01 RX ADMIN — NYSTATIN 500000 UNITS: 100000 SUSPENSION ORAL at 13:06

## 2019-01-01 RX ADMIN — INSULIN LISPRO 3 UNITS: 100 INJECTION, SOLUTION INTRAVENOUS; SUBCUTANEOUS at 17:33

## 2019-01-01 RX ADMIN — LEVOTHYROXINE SODIUM 112 MCG: 112 TABLET ORAL at 06:15

## 2019-01-01 RX ADMIN — DICYCLOMINE HYDROCHLORIDE 10 MG: 10 CAPSULE ORAL at 02:30

## 2019-01-01 RX ADMIN — POTASSIUM CHLORIDE 10 MEQ: 7.46 INJECTION, SOLUTION INTRAVENOUS at 21:32

## 2019-01-01 RX ADMIN — SODIUM CHLORIDE 15 MG/HR: 900 INJECTION, SOLUTION INTRAVENOUS at 15:20

## 2019-01-01 RX ADMIN — BUMETANIDE 1 MG: 0.25 INJECTION INTRAMUSCULAR; INTRAVENOUS at 09:52

## 2019-01-01 RX ADMIN — DEXTROSE MONOHYDRATE 250 ML: 10 INJECTION, SOLUTION INTRAVENOUS at 19:07

## 2019-01-01 RX ADMIN — METOLAZONE 10 MG: 5 TABLET ORAL at 15:48

## 2019-01-01 RX ADMIN — BUMETANIDE 1 MG: 0.25 INJECTION INTRAMUSCULAR; INTRAVENOUS at 09:13

## 2019-01-01 RX ADMIN — PIPERACILLIN SODIUM,TAZOBACTAM SODIUM 3.38 G: 3; .375 INJECTION, POWDER, FOR SOLUTION INTRAVENOUS at 10:34

## 2019-01-01 RX ADMIN — METOPROLOL TARTRATE 5 MG: 5 INJECTION INTRAVENOUS at 15:14

## 2019-01-01 RX ADMIN — FUROSEMIDE 40 MG: 10 INJECTION, SOLUTION INTRAMUSCULAR; INTRAVENOUS at 02:03

## 2019-01-01 RX ADMIN — Medication 16 G: at 17:11

## 2019-01-01 RX ADMIN — PIPERACILLIN SODIUM,TAZOBACTAM SODIUM 3.38 G: 3; .375 INJECTION, POWDER, FOR SOLUTION INTRAVENOUS at 17:02

## 2019-01-01 RX ADMIN — SODIUM CHLORIDE 1250 MG: 900 INJECTION, SOLUTION INTRAVENOUS at 09:10

## 2019-01-01 RX ADMIN — DICYCLOMINE HYDROCHLORIDE 10 MG: 10 CAPSULE ORAL at 11:01

## 2019-01-01 RX ADMIN — PANTOPRAZOLE SODIUM 40 MG: 40 INJECTION, POWDER, FOR SOLUTION INTRAVENOUS at 09:11

## 2019-01-01 RX ADMIN — POTASSIUM CHLORIDE 10 MEQ: 7.46 INJECTION, SOLUTION INTRAVENOUS at 22:40

## 2019-01-01 RX ADMIN — METOPROLOL TARTRATE 25 MG: 25 TABLET ORAL at 00:53

## 2019-01-01 RX ADMIN — POTASSIUM CHLORIDE 40 MEQ: 1.5 POWDER, FOR SOLUTION ORAL at 17:33

## 2019-01-01 RX ADMIN — PIPERACILLIN SODIUM,TAZOBACTAM SODIUM 3.38 G: 3; .375 INJECTION, POWDER, FOR SOLUTION INTRAVENOUS at 02:56

## 2019-01-01 RX ADMIN — SODIUM CHLORIDE, SODIUM LACTATE, POTASSIUM CHLORIDE, CALCIUM CHLORIDE, AND DEXTROSE MONOHYDRATE 100 ML/HR: 600; 310; 30; 20; 5 INJECTION, SOLUTION INTRAVENOUS at 03:18

## 2019-01-01 RX ADMIN — NYSTATIN 500000 UNITS: 100000 SUSPENSION ORAL at 17:49

## 2019-01-01 RX ADMIN — HYDRALAZINE HYDROCHLORIDE 20 MG: 20 INJECTION INTRAMUSCULAR; INTRAVENOUS at 05:14

## 2019-01-01 RX ADMIN — LEVOTHYROXINE SODIUM 112 MCG: 112 TABLET ORAL at 09:49

## 2019-01-01 RX ADMIN — INSULIN LISPRO 6 UNITS: 100 INJECTION, SOLUTION INTRAVENOUS; SUBCUTANEOUS at 13:06

## 2019-01-01 RX ADMIN — HEPARIN SODIUM 9070 UNITS: 1000 INJECTION INTRAVENOUS; SUBCUTANEOUS at 04:04

## 2019-01-01 RX ADMIN — INSULIN LISPRO 9 UNITS: 100 INJECTION, SOLUTION INTRAVENOUS; SUBCUTANEOUS at 04:07

## 2019-01-01 RX ADMIN — LEVOTHYROXINE SODIUM 112 MCG: 112 TABLET ORAL at 05:46

## 2019-01-01 RX ADMIN — TRAMADOL HYDROCHLORIDE 50 MG: 50 TABLET, FILM COATED ORAL at 08:21

## 2019-01-01 RX ADMIN — SODIUM CHLORIDE, SODIUM LACTATE, POTASSIUM CHLORIDE, CALCIUM CHLORIDE, AND DEXTROSE MONOHYDRATE 25 ML/HR: 600; 310; 30; 20; 5 INJECTION, SOLUTION INTRAVENOUS at 06:48

## 2019-01-01 RX ADMIN — SODIUM CHLORIDE 15 MG/HR: 900 INJECTION, SOLUTION INTRAVENOUS at 17:46

## 2019-01-01 RX ADMIN — INSULIN GLARGINE 7 UNITS: 100 INJECTION, SOLUTION SUBCUTANEOUS at 09:12

## 2019-01-01 RX ADMIN — INSULIN LISPRO 6 UNITS: 100 INJECTION, SOLUTION INTRAVENOUS; SUBCUTANEOUS at 17:47

## 2019-01-01 RX ADMIN — AMLODIPINE BESYLATE 10 MG: 10 TABLET ORAL at 08:21

## 2019-01-01 RX ADMIN — AMLODIPINE BESYLATE 10 MG: 10 TABLET ORAL at 10:08

## 2019-01-01 RX ADMIN — BUMETANIDE 1 MG: 0.25 INJECTION INTRAMUSCULAR; INTRAVENOUS at 08:42

## 2019-01-01 RX ADMIN — POTASSIUM CHLORIDE 10 MEQ: 7.46 INJECTION, SOLUTION INTRAVENOUS at 08:22

## 2019-01-01 RX ADMIN — PIPERACILLIN SODIUM,TAZOBACTAM SODIUM 3.38 G: 3; .375 INJECTION, POWDER, FOR SOLUTION INTRAVENOUS at 10:26

## 2019-01-01 RX ADMIN — VANCOMYCIN HYDROCHLORIDE 1500 MG: 10 INJECTION, POWDER, LYOPHILIZED, FOR SOLUTION INTRAVENOUS at 05:00

## 2019-01-01 RX ADMIN — HYDROCORTISONE SODIUM SUCCINATE 100 MG: 100 INJECTION, POWDER, FOR SOLUTION INTRAMUSCULAR; INTRAVENOUS at 22:24

## 2019-01-01 RX ADMIN — POLYETHYLENE GLYCOL 3350 17 G: 17 POWDER, FOR SOLUTION ORAL at 09:39

## 2019-01-01 RX ADMIN — METOPROLOL TARTRATE 5 MG: 5 INJECTION INTRAVENOUS at 22:13

## 2019-01-01 RX ADMIN — HYDROCORTISONE SODIUM SUCCINATE 100 MG: 100 INJECTION, POWDER, FOR SOLUTION INTRAMUSCULAR; INTRAVENOUS at 08:42

## 2019-01-01 RX ADMIN — SODIUM CHLORIDE 20 MG/HR: 900 INJECTION, SOLUTION INTRAVENOUS at 08:21

## 2019-01-01 RX ADMIN — LEVOTHYROXINE SODIUM 112 MCG: 112 TABLET ORAL at 06:05

## 2019-01-01 RX ADMIN — METOPROLOL TARTRATE 5 MG: 5 INJECTION INTRAVENOUS at 09:19

## 2019-01-01 RX ADMIN — NYSTATIN 500000 UNITS: 100000 SUSPENSION ORAL at 12:38

## 2019-01-01 RX ADMIN — POTASSIUM CHLORIDE 10 MEQ: 7.46 INJECTION, SOLUTION INTRAVENOUS at 15:46

## 2019-01-01 RX ADMIN — NYSTATIN 500000 UNITS: 100000 SUSPENSION ORAL at 21:33

## 2019-01-01 RX ADMIN — AMLODIPINE BESYLATE 10 MG: 10 TABLET ORAL at 11:01

## 2019-01-01 RX ADMIN — MORPHINE SULFATE 10 MG: 20 SOLUTION ORAL at 16:18

## 2019-01-01 RX ADMIN — INSULIN LISPRO 6 UNITS: 100 INJECTION, SOLUTION INTRAVENOUS; SUBCUTANEOUS at 12:31

## 2019-01-01 RX ADMIN — POTASSIUM CHLORIDE 20 MEQ: 400 INJECTION, SOLUTION INTRAVENOUS at 16:42

## 2019-01-01 RX ADMIN — METOPROLOL TARTRATE 25 MG: 25 TABLET ORAL at 09:22

## 2019-01-01 RX ADMIN — PIPERACILLIN SODIUM,TAZOBACTAM SODIUM 3.38 G: 3; .375 INJECTION, POWDER, FOR SOLUTION INTRAVENOUS at 02:39

## 2019-01-01 RX ADMIN — METOPROLOL TARTRATE 5 MG: 5 INJECTION INTRAVENOUS at 02:47

## 2019-01-01 RX ADMIN — INSULIN LISPRO 6 UNITS: 100 INJECTION, SOLUTION INTRAVENOUS; SUBCUTANEOUS at 00:00

## 2019-01-01 RX ADMIN — ALBUMIN (HUMAN) 25 G: 0.25 INJECTION, SOLUTION INTRAVENOUS at 15:59

## 2019-01-01 RX ADMIN — SODIUM CHLORIDE 15 MG/HR: 900 INJECTION, SOLUTION INTRAVENOUS at 00:03

## 2019-01-01 RX ADMIN — POLYETHYLENE GLYCOL 3350 17 G: 17 POWDER, FOR SOLUTION ORAL at 09:22

## 2019-01-01 RX ADMIN — METOPROLOL TARTRATE 25 MG: 25 TABLET ORAL at 11:02

## 2019-01-01 RX ADMIN — ALBUMIN (HUMAN) 25 G: 0.25 INJECTION, SOLUTION INTRAVENOUS at 17:10

## 2019-01-01 RX ADMIN — HEPARIN SODIUM 4200 UNITS: 1000 INJECTION INTRAVENOUS; SUBCUTANEOUS at 08:07

## 2019-01-01 RX ADMIN — PANTOPRAZOLE SODIUM 40 MG: 40 INJECTION, POWDER, FOR SOLUTION INTRAVENOUS at 08:22

## 2019-01-01 RX ADMIN — POTASSIUM CHLORIDE 20 MEQ: 400 INJECTION, SOLUTION INTRAVENOUS at 10:20

## 2019-01-01 RX ADMIN — INSULIN LISPRO 3 UNITS: 100 INJECTION, SOLUTION INTRAVENOUS; SUBCUTANEOUS at 13:19

## 2019-01-01 RX ADMIN — DEXTROSE MONOHYDRATE 25 ML/HR: 5 INJECTION, SOLUTION INTRAVENOUS at 21:49

## 2019-01-01 RX ADMIN — DEXTROSE MONOHYDRATE 250 ML: 10 INJECTION, SOLUTION INTRAVENOUS at 14:47

## 2019-01-01 RX ADMIN — PANTOPRAZOLE SODIUM 40 MG: 40 INJECTION, POWDER, FOR SOLUTION INTRAVENOUS at 09:31

## 2019-01-01 RX ADMIN — BUMETANIDE 1 MG: 0.25 INJECTION INTRAMUSCULAR; INTRAVENOUS at 21:06

## 2019-01-01 RX ADMIN — POTASSIUM CHLORIDE 10 MEQ: 7.46 INJECTION, SOLUTION INTRAVENOUS at 23:22

## 2019-01-01 RX ADMIN — DEXTROSE MONOHYDRATE 200 ML/HR: 10 INJECTION, SOLUTION INTRAVENOUS at 17:37

## 2019-01-01 RX ADMIN — LEVOFLOXACIN 750 MG: 5 INJECTION, SOLUTION INTRAVENOUS at 03:15

## 2019-01-01 RX ADMIN — HYDROCORTISONE SODIUM SUCCINATE 100 MG: 100 INJECTION, POWDER, FOR SOLUTION INTRAMUSCULAR; INTRAVENOUS at 16:49

## 2019-01-01 RX ADMIN — SODIUM CHLORIDE 40 MG: 9 INJECTION INTRAMUSCULAR; INTRAVENOUS; SUBCUTANEOUS at 09:38

## 2019-01-01 RX ADMIN — INSULIN LISPRO 9 UNITS: 100 INJECTION, SOLUTION INTRAVENOUS; SUBCUTANEOUS at 12:58

## 2019-01-01 RX ADMIN — NYSTATIN 500000 UNITS: 100000 SUSPENSION ORAL at 13:19

## 2019-01-01 RX ADMIN — INSULIN GLARGINE 9 UNITS: 100 INJECTION, SOLUTION SUBCUTANEOUS at 11:03

## 2019-01-01 RX ADMIN — TRAMADOL HYDROCHLORIDE 50 MG: 50 TABLET, FILM COATED ORAL at 15:48

## 2019-01-01 RX ADMIN — METOPROLOL TARTRATE 5 MG: 5 INJECTION INTRAVENOUS at 09:31

## 2019-01-01 RX ADMIN — POTASSIUM CHLORIDE 10 MEQ: 7.46 INJECTION, SOLUTION INTRAVENOUS at 20:16

## 2019-01-01 RX ADMIN — DEXTROSE MONOHYDRATE 50 ML/HR: 70 INJECTION, SOLUTION INTRAVENOUS at 22:40

## 2019-01-01 RX ADMIN — POLYETHYLENE GLYCOL 3350 17 G: 17 POWDER, FOR SOLUTION ORAL at 09:19

## 2019-01-01 RX ADMIN — HYDRALAZINE HYDROCHLORIDE 20 MG: 20 INJECTION INTRAMUSCULAR; INTRAVENOUS at 13:31

## 2019-01-01 RX ADMIN — METOPROLOL TARTRATE 25 MG: 25 TABLET ORAL at 23:52

## 2019-01-01 RX ADMIN — PANTOPRAZOLE SODIUM 40 MG: 40 INJECTION, POWDER, FOR SOLUTION INTRAVENOUS at 09:19

## 2019-01-01 RX ADMIN — BUMETANIDE 1 MG: 1 TABLET ORAL at 17:33

## 2019-01-01 RX ADMIN — ACETAMINOPHEN 650 MG: 325 TABLET, FILM COATED ORAL at 13:19

## 2019-01-01 RX ADMIN — POTASSIUM CHLORIDE 10 MEQ: 7.46 INJECTION, SOLUTION INTRAVENOUS at 06:26

## 2019-01-01 RX ADMIN — METOPROLOL TARTRATE 5 MG: 5 INJECTION INTRAVENOUS at 22:24

## 2019-01-01 RX ADMIN — SODIUM CHLORIDE 15 MG/HR: 900 INJECTION, SOLUTION INTRAVENOUS at 04:19

## 2019-01-01 RX ADMIN — INSULIN GLARGINE 5 UNITS: 100 INJECTION, SOLUTION SUBCUTANEOUS at 09:36

## 2019-01-01 RX ADMIN — HEPARIN SODIUM 4390 UNITS: 1000 INJECTION INTRAVENOUS; SUBCUTANEOUS at 18:54

## 2019-01-01 RX ADMIN — PIPERACILLIN SODIUM,TAZOBACTAM SODIUM 3.38 G: 3; .375 INJECTION, POWDER, FOR SOLUTION INTRAVENOUS at 02:33

## 2019-01-01 RX ADMIN — SODIUM CHLORIDE: 234 INJECTION INTRAMUSCULAR; INTRAVENOUS; SUBCUTANEOUS at 12:57

## 2019-01-01 RX ADMIN — INSULIN GLARGINE 12 UNITS: 100 INJECTION, SOLUTION SUBCUTANEOUS at 08:22

## 2019-01-01 RX ADMIN — HEPARIN SODIUM 9070 UNITS: 1000 INJECTION INTRAVENOUS; SUBCUTANEOUS at 22:57

## 2019-01-01 RX ADMIN — BUMETANIDE 1 MG: 1 TABLET ORAL at 18:30

## 2019-01-01 RX ADMIN — BUMETANIDE 1 MG: 0.25 INJECTION INTRAMUSCULAR; INTRAVENOUS at 03:15

## 2019-01-01 RX ADMIN — HEPARIN SODIUM AND DEXTROSE 6 UNITS/KG/HR: 10000; 5 INJECTION INTRAVENOUS at 07:54

## 2019-01-01 RX ADMIN — HYDRALAZINE HYDROCHLORIDE 20 MG: 20 INJECTION INTRAMUSCULAR; INTRAVENOUS at 15:59

## 2019-01-01 RX ADMIN — POTASSIUM CHLORIDE 20 MEQ: 400 INJECTION, SOLUTION INTRAVENOUS at 18:53

## 2019-01-01 RX ADMIN — PIPERACILLIN SODIUM,TAZOBACTAM SODIUM 3.38 G: 3; .375 INJECTION, POWDER, FOR SOLUTION INTRAVENOUS at 09:49

## 2019-01-01 RX ADMIN — POTASSIUM & SODIUM PHOSPHATES POWDER PACK 280-160-250 MG 1 PACKET: 280-160-250 PACK at 12:53

## 2019-01-01 RX ADMIN — BUMETANIDE 1 MG: 0.25 INJECTION INTRAMUSCULAR; INTRAVENOUS at 17:01

## 2019-01-01 RX ADMIN — POTASSIUM & SODIUM PHOSPHATES POWDER PACK 280-160-250 MG 1 PACKET: 280-160-250 PACK at 10:31

## 2019-01-01 RX ADMIN — AMLODIPINE BESYLATE 10 MG: 10 TABLET ORAL at 13:20

## 2019-01-01 RX ADMIN — HEPARIN SODIUM AND DEXTROSE 15 UNITS/KG/HR: 10000; 5 INJECTION INTRAVENOUS at 03:22

## 2019-01-01 RX ADMIN — SODIUM CHLORIDE 40 MG: 9 INJECTION INTRAMUSCULAR; INTRAVENOUS; SUBCUTANEOUS at 09:10

## 2019-01-01 RX ADMIN — SODIUM CHLORIDE 20 MG/HR: 900 INJECTION, SOLUTION INTRAVENOUS at 03:13

## 2019-01-01 RX ADMIN — NYSTATIN 500000 UNITS: 100000 SUSPENSION ORAL at 18:30

## 2019-01-01 RX ADMIN — DICYCLOMINE HYDROCHLORIDE 10 MG: 10 CAPSULE ORAL at 10:10

## 2019-01-01 RX ADMIN — POTASSIUM CHLORIDE 40 MEQ: 20 TABLET, EXTENDED RELEASE ORAL at 09:19

## 2019-01-01 RX ADMIN — POTASSIUM & SODIUM PHOSPHATES POWDER PACK 280-160-250 MG 1 PACKET: 280-160-250 PACK at 11:12

## 2019-01-01 RX ADMIN — POTASSIUM CHLORIDE 10 MEQ: 7.46 INJECTION, SOLUTION INTRAVENOUS at 05:46

## 2019-01-01 RX ADMIN — HEPARIN SODIUM 4540 UNITS: 1000 INJECTION INTRAVENOUS; SUBCUTANEOUS at 13:10

## 2019-01-01 RX ADMIN — INSULIN LISPRO 3 UNITS: 100 INJECTION, SOLUTION INTRAVENOUS; SUBCUTANEOUS at 06:22

## 2019-01-01 RX ADMIN — INSULIN LISPRO 3 UNITS: 100 INJECTION, SOLUTION INTRAVENOUS; SUBCUTANEOUS at 17:46

## 2019-01-01 RX ADMIN — NYSTATIN 500000 UNITS: 100000 SUSPENSION ORAL at 09:38

## 2019-01-01 RX ADMIN — POTASSIUM & SODIUM PHOSPHATES POWDER PACK 280-160-250 MG 1 PACKET: 280-160-250 PACK at 09:32

## 2019-01-01 RX ADMIN — SODIUM CHLORIDE 15 MG/HR: 900 INJECTION, SOLUTION INTRAVENOUS at 12:55

## 2019-01-01 RX ADMIN — NYSTATIN 500000 UNITS: 100000 SUSPENSION ORAL at 22:26

## 2019-01-01 RX ADMIN — INSULIN GLARGINE 5 UNITS: 100 INJECTION, SOLUTION SUBCUTANEOUS at 09:00

## 2019-01-01 RX ADMIN — LEVOTHYROXINE SODIUM 112 MCG: 112 TABLET ORAL at 09:52

## 2019-01-01 RX ADMIN — SODIUM CHLORIDE 20 MG/HR: 900 INJECTION, SOLUTION INTRAVENOUS at 22:40

## 2019-01-01 RX ADMIN — BARIUM SULFATE 15 ML: 400 PASTE ORAL at 12:38

## 2019-01-01 RX ADMIN — METOPROLOL TARTRATE 25 MG: 25 TABLET ORAL at 10:09

## 2019-01-01 RX ADMIN — POTASSIUM CHLORIDE 10 MEQ: 7.46 INJECTION, SOLUTION INTRAVENOUS at 23:40

## 2019-01-01 RX ADMIN — PIPERACILLIN SODIUM,TAZOBACTAM SODIUM 3.38 G: 3; .375 INJECTION, POWDER, FOR SOLUTION INTRAVENOUS at 10:19

## 2019-01-01 RX ADMIN — NYSTATIN 500000 UNITS: 100000 SUSPENSION ORAL at 17:46

## 2019-01-01 RX ADMIN — PIPERACILLIN SODIUM,TAZOBACTAM SODIUM 4.5 G: 4; .5 INJECTION, POWDER, FOR SOLUTION INTRAVENOUS at 02:03

## 2019-01-01 RX ADMIN — PIPERACILLIN SODIUM,TAZOBACTAM SODIUM 3.38 G: 3; .375 INJECTION, POWDER, FOR SOLUTION INTRAVENOUS at 19:59

## 2019-01-01 RX ADMIN — BUMETANIDE 1 MG: 0.25 INJECTION INTRAMUSCULAR; INTRAVENOUS at 14:23

## 2019-01-01 RX ADMIN — NYSTATIN 500000 UNITS: 100000 SUSPENSION ORAL at 09:10

## 2019-01-01 RX ADMIN — INSULIN LISPRO 3 UNITS: 100 INJECTION, SOLUTION INTRAVENOUS; SUBCUTANEOUS at 00:31

## 2019-01-01 RX ADMIN — SODIUM CHLORIDE 40 MG: 9 INJECTION INTRAMUSCULAR; INTRAVENOUS; SUBCUTANEOUS at 09:00

## 2019-01-01 RX ADMIN — INSULIN GLARGINE 2 UNITS: 100 INJECTION, SOLUTION SUBCUTANEOUS at 12:12

## 2019-01-01 RX ADMIN — HEPARIN SODIUM 8750 UNITS: 1000 INJECTION INTRAVENOUS; SUBCUTANEOUS at 07:58

## 2019-01-01 RX ADMIN — VANCOMYCIN HYDROCHLORIDE 2000 MG: 10 INJECTION, POWDER, LYOPHILIZED, FOR SOLUTION INTRAVENOUS at 04:46

## 2019-01-01 RX ADMIN — POLYETHYLENE GLYCOL 3350 17 G: 17 POWDER, FOR SOLUTION ORAL at 11:15

## 2019-01-01 RX ADMIN — LEVOTHYROXINE SODIUM 112 MCG: 112 TABLET ORAL at 06:10

## 2019-01-01 RX ADMIN — HEPARIN SODIUM AND DEXTROSE 5 UNITS/KG/HR: 10000; 5 INJECTION INTRAVENOUS at 05:52

## 2019-01-01 RX ADMIN — HYDRALAZINE HYDROCHLORIDE 20 MG: 20 INJECTION INTRAMUSCULAR; INTRAVENOUS at 10:28

## 2019-01-01 RX ADMIN — HYDRALAZINE HYDROCHLORIDE 20 MG: 20 INJECTION INTRAMUSCULAR; INTRAVENOUS at 09:11

## 2019-01-01 RX ADMIN — HYDROCORTISONE SODIUM SUCCINATE 100 MG: 100 INJECTION, POWDER, FOR SOLUTION INTRAMUSCULAR; INTRAVENOUS at 15:45

## 2019-01-01 RX ADMIN — NYSTATIN 500000 UNITS: 100000 SUSPENSION ORAL at 11:28

## 2019-01-01 RX ADMIN — INSULIN LISPRO 6 UNITS: 100 INJECTION, SOLUTION INTRAVENOUS; SUBCUTANEOUS at 06:56

## 2019-01-01 RX ADMIN — NYSTATIN 500000 UNITS: 100000 SUSPENSION ORAL at 18:03

## 2019-01-01 RX ADMIN — PANTOPRAZOLE SODIUM 40 MG: 40 INJECTION, POWDER, FOR SOLUTION INTRAVENOUS at 08:42

## 2019-01-01 RX ADMIN — PIPERACILLIN SODIUM,TAZOBACTAM SODIUM 3.38 G: 3; .375 INJECTION, POWDER, FOR SOLUTION INTRAVENOUS at 02:38

## 2019-01-01 RX ADMIN — ALBUMIN (HUMAN) 25 G: 0.25 INJECTION, SOLUTION INTRAVENOUS at 18:26

## 2019-01-01 RX ADMIN — SODIUM CHLORIDE 15 MG/HR: 900 INJECTION, SOLUTION INTRAVENOUS at 19:52

## 2019-01-01 RX ADMIN — AMLODIPINE BESYLATE 10 MG: 10 TABLET ORAL at 09:11

## 2019-01-01 RX ADMIN — PANTOPRAZOLE SODIUM 40 MG: 40 INJECTION, POWDER, FOR SOLUTION INTRAVENOUS at 09:51

## 2019-01-01 RX ADMIN — INSULIN LISPRO 3 UNITS: 100 INJECTION, SOLUTION INTRAVENOUS; SUBCUTANEOUS at 06:15

## 2019-01-01 RX ADMIN — PIPERACILLIN SODIUM,TAZOBACTAM SODIUM 3.38 G: 3; .375 INJECTION, POWDER, FOR SOLUTION INTRAVENOUS at 18:56

## 2019-01-01 RX ADMIN — NYSTATIN 500000 UNITS: 100000 SUSPENSION ORAL at 14:46

## 2019-01-01 RX ADMIN — SODIUM CHLORIDE 40 MG: 9 INJECTION INTRAMUSCULAR; INTRAVENOUS; SUBCUTANEOUS at 10:08

## 2019-01-01 RX ADMIN — METOPROLOL TARTRATE 5 MG: 5 INJECTION INTRAVENOUS at 15:48

## 2019-01-01 RX ADMIN — DEXTROSE MONOHYDRATE 250 ML: 10 INJECTION, SOLUTION INTRAVENOUS at 08:29

## 2019-01-01 RX ADMIN — POTASSIUM CHLORIDE 40 MEQ: 1.5 POWDER, FOR SOLUTION ORAL at 07:27

## 2019-01-01 RX ADMIN — DEXTROSE MONOHYDRATE 50 ML/HR: 10 INJECTION, SOLUTION INTRAVENOUS at 00:20

## 2019-01-01 RX ADMIN — AMLODIPINE BESYLATE 5 MG: 5 TABLET ORAL at 09:19

## 2019-01-01 RX ADMIN — PANTOPRAZOLE SODIUM 40 MG: 40 INJECTION, POWDER, FOR SOLUTION INTRAVENOUS at 13:59

## 2019-01-01 RX ADMIN — POTASSIUM CHLORIDE 40 MEQ: 1.5 POWDER, FOR SOLUTION ORAL at 17:04

## 2019-01-01 RX ADMIN — SODIUM CHLORIDE 10 MG/HR: 900 INJECTION, SOLUTION INTRAVENOUS at 11:40

## 2019-01-01 RX ADMIN — HYDRALAZINE HYDROCHLORIDE 20 MG: 20 INJECTION INTRAMUSCULAR; INTRAVENOUS at 02:07

## 2019-01-01 RX ADMIN — ALBUMIN (HUMAN) 25 G: 0.25 INJECTION, SOLUTION INTRAVENOUS at 10:42

## 2019-01-01 RX ADMIN — NYSTATIN 500000 UNITS: 100000 SUSPENSION ORAL at 09:00

## 2019-01-01 RX ADMIN — POLYETHYLENE GLYCOL 3350 17 G: 17 POWDER, FOR SOLUTION ORAL at 09:54

## 2019-01-01 RX ADMIN — POLYETHYLENE GLYCOL 3350 17 G: 17 POWDER, FOR SOLUTION ORAL at 09:00

## 2019-01-01 RX ADMIN — PANTOPRAZOLE SODIUM 40 MG: 40 INJECTION, POWDER, FOR SOLUTION INTRAVENOUS at 08:20

## 2019-01-01 RX ADMIN — HYDROCORTISONE SODIUM SUCCINATE 100 MG: 100 INJECTION, POWDER, FOR SOLUTION INTRAMUSCULAR; INTRAVENOUS at 08:22

## 2019-01-01 RX ADMIN — HYDROCORTISONE SODIUM SUCCINATE 100 MG: 100 INJECTION, POWDER, FOR SOLUTION INTRAMUSCULAR; INTRAVENOUS at 23:04

## 2019-01-01 RX ADMIN — LEVOTHYROXINE SODIUM 112 MCG: 112 TABLET ORAL at 07:27

## 2019-01-01 RX ADMIN — DICYCLOMINE HYDROCHLORIDE 10 MG: 10 CAPSULE ORAL at 17:04

## 2019-01-01 RX ADMIN — ALBUMIN (HUMAN) 25 G: 0.25 INJECTION, SOLUTION INTRAVENOUS at 14:23

## 2019-01-01 RX ADMIN — METOPROLOL TARTRATE 5 MG: 5 INJECTION INTRAVENOUS at 09:12

## 2019-01-01 RX ADMIN — PIPERACILLIN SODIUM,TAZOBACTAM SODIUM 3.38 G: 3; .375 INJECTION, POWDER, FOR SOLUTION INTRAVENOUS at 17:53

## 2019-01-01 RX ADMIN — HEPARIN SODIUM 9070 UNITS: 1000 INJECTION INTRAVENOUS; SUBCUTANEOUS at 03:02

## 2019-01-01 RX ADMIN — AMLODIPINE BESYLATE 5 MG: 5 TABLET ORAL at 09:38

## 2019-01-01 RX ADMIN — PIPERACILLIN SODIUM,TAZOBACTAM SODIUM 3.38 G: 3; .375 INJECTION, POWDER, FOR SOLUTION INTRAVENOUS at 11:16

## 2019-01-01 RX ADMIN — POLYETHYLENE GLYCOL 3350 17 G: 17 POWDER, FOR SOLUTION ORAL at 10:09

## 2019-01-01 RX ADMIN — SODIUM CHLORIDE 15 MG/HR: 900 INJECTION, SOLUTION INTRAVENOUS at 07:34

## 2019-01-01 RX ADMIN — POTASSIUM CHLORIDE 10 MEQ: 7.46 INJECTION, SOLUTION INTRAVENOUS at 22:18

## 2019-01-01 RX ADMIN — Medication 10 ML: at 22:06

## 2019-01-01 RX ADMIN — NITROGLYCERIN 1 INCH: 20 OINTMENT TOPICAL at 03:00

## 2019-01-01 RX ADMIN — POTASSIUM CHLORIDE 10 MEQ: 7.46 INJECTION, SOLUTION INTRAVENOUS at 11:15

## 2019-01-01 RX ADMIN — NYSTATIN 500000 UNITS: 100000 SUSPENSION ORAL at 23:48

## 2019-01-01 RX ADMIN — INSULIN GLARGINE 10 UNITS: 100 INJECTION, SOLUTION SUBCUTANEOUS at 09:36

## 2019-01-01 RX ADMIN — POTASSIUM CHLORIDE 40 MEQ: 1.5 POWDER, FOR SOLUTION ORAL at 09:39

## 2019-01-01 RX ADMIN — BARIUM SULFATE 30 ML: 0.81 POWDER, FOR SUSPENSION ORAL at 12:38

## 2019-01-01 RX ADMIN — HEPARIN SODIUM AND DEXTROSE 18 UNITS/KG/HR: 10000; 5 INJECTION INTRAVENOUS at 03:04

## 2019-01-01 RX ADMIN — KETOROLAC TROMETHAMINE 15 MG: 30 INJECTION, SOLUTION INTRAMUSCULAR at 02:40

## 2019-01-01 RX ADMIN — HYDRALAZINE HYDROCHLORIDE 20 MG: 20 INJECTION INTRAMUSCULAR; INTRAVENOUS at 15:42

## 2019-01-01 RX ADMIN — DEXTROSE MONOHYDRATE 250 ML: 10 INJECTION, SOLUTION INTRAVENOUS at 15:30

## 2019-01-01 RX ADMIN — INSULIN GLARGINE 9 UNITS: 100 INJECTION, SOLUTION SUBCUTANEOUS at 10:11

## 2019-01-01 RX ADMIN — HEPARIN SODIUM 4380 UNITS: 1000 INJECTION, SOLUTION INTRAVENOUS; SUBCUTANEOUS at 16:48

## 2019-01-01 RX ADMIN — VANCOMYCIN HYDROCHLORIDE 1500 MG: 10 INJECTION, POWDER, LYOPHILIZED, FOR SOLUTION INTRAVENOUS at 05:25

## 2019-01-01 RX ADMIN — HYDROCORTISONE SODIUM SUCCINATE 100 MG: 100 INJECTION, POWDER, FOR SOLUTION INTRAMUSCULAR; INTRAVENOUS at 09:12

## 2019-01-01 RX ADMIN — SODIUM CHLORIDE 15 MG/HR: 900 INJECTION, SOLUTION INTRAVENOUS at 01:48

## 2019-01-01 RX ADMIN — INSULIN LISPRO 3 UNITS: 100 INJECTION, SOLUTION INTRAVENOUS; SUBCUTANEOUS at 05:34

## 2019-01-01 RX ADMIN — SODIUM CHLORIDE 15 MG/HR: 900 INJECTION, SOLUTION INTRAVENOUS at 06:21

## 2019-01-01 RX ADMIN — TRAMADOL HYDROCHLORIDE 50 MG: 50 TABLET, FILM COATED ORAL at 10:25

## 2019-01-01 RX ADMIN — NYSTATIN 500000 UNITS: 100000 SUSPENSION ORAL at 08:21

## 2019-01-01 RX ADMIN — BUMETANIDE 1 MG: 0.25 INJECTION INTRAMUSCULAR; INTRAVENOUS at 21:02

## 2019-01-01 RX ADMIN — DEXTROSE MONOHYDRATE AND SODIUM CHLORIDE 100 ML/HR: 5; .9 INJECTION, SOLUTION INTRAVENOUS at 11:33

## 2019-01-01 RX ADMIN — METOPROLOL TARTRATE 5 MG: 5 INJECTION INTRAVENOUS at 08:20

## 2019-01-01 RX ADMIN — FUROSEMIDE 40 MG: 10 INJECTION, SOLUTION INTRAMUSCULAR; INTRAVENOUS at 14:00

## 2019-01-01 RX ADMIN — HEPARIN SODIUM 9160 UNITS: 1000 INJECTION INTRAVENOUS; SUBCUTANEOUS at 06:06

## 2019-01-01 RX ADMIN — HYDROCORTISONE SODIUM SUCCINATE 100 MG: 100 INJECTION, POWDER, FOR SOLUTION INTRAMUSCULAR; INTRAVENOUS at 15:47

## 2019-01-01 RX ADMIN — INSULIN LISPRO 6 UNITS: 100 INJECTION, SOLUTION INTRAVENOUS; SUBCUTANEOUS at 23:48

## 2019-01-01 RX ADMIN — SODIUM CHLORIDE 40 MG: 9 INJECTION INTRAMUSCULAR; INTRAVENOUS; SUBCUTANEOUS at 11:28

## 2019-01-01 RX ADMIN — POTASSIUM CHLORIDE 10 MEQ: 7.46 INJECTION, SOLUTION INTRAVENOUS at 07:27

## 2019-01-01 RX ADMIN — PIPERACILLIN SODIUM,TAZOBACTAM SODIUM 3.38 G: 3; .375 INJECTION, POWDER, FOR SOLUTION INTRAVENOUS at 03:10

## 2019-01-01 RX ADMIN — NYSTATIN 500000 UNITS: 100000 SUSPENSION ORAL at 12:23

## 2019-01-01 RX ADMIN — INSULIN LISPRO 6 UNITS: 100 INJECTION, SOLUTION INTRAVENOUS; SUBCUTANEOUS at 19:13

## 2019-01-01 RX ADMIN — BUMETANIDE 2 MG: 0.25 INJECTION INTRAMUSCULAR; INTRAVENOUS at 17:53

## 2019-01-01 RX ADMIN — DEXTROSE MONOHYDRATE 25 ML/HR: 5 INJECTION, SOLUTION INTRAVENOUS at 08:32

## 2019-01-01 RX ADMIN — AMLODIPINE BESYLATE 5 MG: 5 TABLET ORAL at 09:00

## 2019-01-01 RX ADMIN — BUMETANIDE 1 MG: 1 TABLET ORAL at 09:11

## 2019-01-01 RX ADMIN — ALBUMIN (HUMAN) 12.5 G: 12.5 INJECTION, SOLUTION INTRAVENOUS at 22:09

## 2019-01-01 RX ADMIN — BARIUM SULFATE 15 ML: 400 SUSPENSION ORAL at 12:38

## 2019-01-01 RX ADMIN — POTASSIUM CHLORIDE 40 MEQ: 1.5 POWDER, FOR SOLUTION ORAL at 18:30

## 2019-01-01 RX ADMIN — PIPERACILLIN SODIUM,TAZOBACTAM SODIUM 3.38 G: 3; .375 INJECTION, POWDER, FOR SOLUTION INTRAVENOUS at 18:02

## 2019-01-01 RX ADMIN — HYDRALAZINE HYDROCHLORIDE 20 MG: 20 INJECTION INTRAMUSCULAR; INTRAVENOUS at 22:06

## 2019-01-01 RX ADMIN — AMLODIPINE BESYLATE 10 MG: 10 TABLET ORAL at 09:31

## 2019-01-01 RX ADMIN — BUMETANIDE 1 MG: 1 TABLET ORAL at 19:14

## 2019-01-01 RX ADMIN — NYSTATIN 500000 UNITS: 100000 SUSPENSION ORAL at 17:33

## 2019-01-01 RX ADMIN — INSULIN LISPRO 9 UNITS: 100 INJECTION, SOLUTION INTRAVENOUS; SUBCUTANEOUS at 12:57

## 2019-01-01 RX ADMIN — INSULIN LISPRO 6 UNITS: 100 INJECTION, SOLUTION INTRAVENOUS; SUBCUTANEOUS at 00:55

## 2019-01-01 RX ADMIN — BUMETANIDE 1 MG: 0.25 INJECTION INTRAMUSCULAR; INTRAVENOUS at 17:10

## 2019-01-01 RX ADMIN — PIPERACILLIN SODIUM,TAZOBACTAM SODIUM 3.38 G: 3; .375 INJECTION, POWDER, FOR SOLUTION INTRAVENOUS at 09:29

## 2019-01-01 RX ADMIN — DEXTROSE MONOHYDRATE 100 ML/HR: 10 INJECTION, SOLUTION INTRAVENOUS at 16:00

## 2019-01-01 RX ADMIN — LEVOTHYROXINE SODIUM 112 MCG: 112 TABLET ORAL at 06:22

## 2019-01-01 RX ADMIN — POTASSIUM CHLORIDE 40 MEQ: 1.5 POWDER, FOR SOLUTION ORAL at 09:00

## 2019-01-01 RX ADMIN — POTASSIUM CHLORIDE 40 MEQ: 1.5 POWDER, FOR SOLUTION ORAL at 10:08

## 2019-01-01 RX ADMIN — PIPERACILLIN SODIUM,TAZOBACTAM SODIUM 3.38 G: 3; .375 INJECTION, POWDER, FOR SOLUTION INTRAVENOUS at 18:20

## 2019-01-01 RX ADMIN — LABETALOL 20 MG/4 ML (5 MG/ML) INTRAVENOUS SYRINGE 10 MG: at 03:35

## 2019-01-01 RX ADMIN — SODIUM CHLORIDE 20 MG/HR: 900 INJECTION, SOLUTION INTRAVENOUS at 13:51

## 2019-01-01 RX ADMIN — HEPARIN SODIUM AND DEXTROSE 6 UNITS/KG/HR: 10000; 5 INJECTION INTRAVENOUS at 18:42

## 2019-01-01 RX ADMIN — POTASSIUM CHLORIDE 10 MEQ: 7.46 INJECTION, SOLUTION INTRAVENOUS at 20:22

## 2019-01-01 RX ADMIN — METOPROLOL TARTRATE 5 MG: 5 INJECTION INTRAVENOUS at 02:33

## 2019-01-01 RX ADMIN — PIPERACILLIN SODIUM,TAZOBACTAM SODIUM 3.38 G: 3; .375 INJECTION, POWDER, FOR SOLUTION INTRAVENOUS at 09:15

## 2019-01-01 RX ADMIN — LEVOTHYROXINE SODIUM 112 MCG: 112 TABLET ORAL at 06:00

## 2019-01-01 RX ADMIN — INSULIN LISPRO 6 UNITS: 100 INJECTION, SOLUTION INTRAVENOUS; SUBCUTANEOUS at 12:38

## 2019-01-01 RX ADMIN — INSULIN LISPRO 6 UNITS: 100 INJECTION, SOLUTION INTRAVENOUS; SUBCUTANEOUS at 17:01

## 2019-01-01 RX ADMIN — PIPERACILLIN SODIUM,TAZOBACTAM SODIUM 3.38 G: 3; .375 INJECTION, POWDER, FOR SOLUTION INTRAVENOUS at 02:47

## 2019-01-01 RX ADMIN — DEXTROSE MONOHYDRATE 25 ML/HR: 10 INJECTION, SOLUTION INTRAVENOUS at 20:38

## 2019-01-01 RX ADMIN — INSULIN GLARGINE 12 UNITS: 100 INJECTION, SOLUTION SUBCUTANEOUS at 08:37

## 2019-01-01 RX ADMIN — SODIUM CHLORIDE, SODIUM LACTATE, POTASSIUM CHLORIDE, AND CALCIUM CHLORIDE 500 ML: 600; 310; 30; 20 INJECTION, SOLUTION INTRAVENOUS at 06:15

## 2019-01-01 RX ADMIN — PIPERACILLIN SODIUM,TAZOBACTAM SODIUM 3.38 G: 3; .375 INJECTION, POWDER, FOR SOLUTION INTRAVENOUS at 03:11

## 2019-01-01 RX ADMIN — SODIUM CHLORIDE 15 MG/HR: 900 INJECTION, SOLUTION INTRAVENOUS at 21:58

## 2019-01-01 RX ADMIN — NYSTATIN 500000 UNITS: 100000 SUSPENSION ORAL at 09:19

## 2019-01-01 RX ADMIN — DEXTROSE MONOHYDRATE 25 ML/HR: 5 INJECTION, SOLUTION INTRAVENOUS at 00:02

## 2019-01-01 RX ADMIN — PIPERACILLIN SODIUM,TAZOBACTAM SODIUM 3.38 G: 3; .375 INJECTION, POWDER, FOR SOLUTION INTRAVENOUS at 19:52

## 2019-01-01 RX ADMIN — POTASSIUM CHLORIDE 40 MEQ: 20 TABLET, EXTENDED RELEASE ORAL at 18:00

## 2019-05-09 PROBLEM — E66.01 SEVERE OBESITY (HCC): Status: ACTIVE | Noted: 2019-01-01

## 2019-05-09 NOTE — PROGRESS NOTES
ROOM # 4 [facility] presents today for Chief Complaint Patient presents with  
 Other 6 month f/u [facility] preferred language for health care discussion is english/other. Is someone accompanying this pt? no 
 
Is the patient using any DME equipment during [address] rollator Depression Screening: 
3 most recent [facility] [place] [facility] Screens 9/20/2018 3/23/2018 8/4/2017 2/23/2017 6/17/2016 2/26/2016 3/11/2015 PHQ Not Done - - - Active Diagnosis of Depression or Bipolar Disorder - - - Little interest or pleasure in doing things Not at all Not at all Several days - Not at all Not at all Several days Feeling down, depressed, irritable, or hopeless Not at all Not at all Several days - Not at all Not at all Not at all Total Score PHQ 2 0 0 2 - 0 0 1 Trouble falling or staying asleep, or sleeping too much - - - - - - - Feeling tired or having little energy - - - - - - - Poor appetite, weight loss, or overeating - - - - - - - Feeling bad about yourself - or that you are a failure or have let yourself or your family down - - - - - - - Trouble concentrating on things such as school, work, reading, or watching TV - - - - - - - Moving or speaking so slowly that other people could have noticed; or the opposite being so fidgety that others notice - - - - - - - Thoughts of being better off dead, or hurting yourself in some way - - - - - - - How difficult have these problems made it for you to do your work, take care of your home and get along with others - - - - - - - Learning Assessment: 
Learning Assessment 11/6/2013 PRIMARY LEARNER Patient PRIMARY LANGUAGE ENGLISH  
LEARNER PREFERENCE PRIMARY READING  
  DEMONSTRATION  
  VIDEOS  
  PICTURES  
  LISTENING  
ANSWERED BY patient RELATIONSHIP SELF Abuse Screening: 
Abuse Screening Questionnaire 8/4/2017 Do you ever feel afraid of your partner? Melecio Bell Are you in a relationship with someone who physically or mentally threatens you?  Melecio Bell  
 Is it safe for you to go home? Izabela Rodríguez Fall Risk Fall Risk Assessment, last 12 mths 9/20/2018 3/23/2018 8/4/2017 2/23/2017 6/17/2016 2/26/2016 3/11/2015 Able to walk? Yes Yes Yes Yes Yes Yes Yes Fall in past 12 months? No No No No No No Yes Fall with injury? - - - - - - Yes Number of falls in past 12 months - - - - - - 5 Fall Risk Score - - - - - - 6 Health Maintenance reviewed and discussed per provider. Yes Green Lipoma is due for Health Maintenance Due Topic Date Due  Shingrix Vaccine Age 50> (1 of 2) 07/15/1988  
 HEMOGLOBIN A1C Q6M  03/20/2019  
 FOOT EXAM Q1  03/23/2019 Please order/place referral if appropriate. Advance Directive: 1. Do you have an advance directive in place? Patient Reply: yes 2. If not, would you like material regarding how to put one in place? Patient Reply: no 
 
Coordination of Care: 1. Have you been to the ER, urgent care clinic since your last visit? Hospitalized since your last visit? no 
 
2. Have you seen or consulted any other health care providers outside of the [address] [place] [address] since your last visit? Include any pap smears or colon screening. Dr. Esha Garcia

## 2019-05-09 NOTE — PROGRESS NOTES
HISTORY OF PRESENT ILLNESS Wendie Richardson is a [de-identified] y.o. female. Visit Vitals BP (!) 216/77 (BP 1 Location: Right arm, BP Patient Position: At rest) Pulse 62 Temp 95.6 °F (35.3 °C) (Oral) Resp 20 Ht 5' 5\" (1.651 m) Wt 235 lb (106.6 kg) SpO2 96% BMI 39.11 kg/m² Says over [place] she had a series of nausea and vomiting spells. Went away at [employer]. Still has occasional episodes which tend to be relieved by prilosec. Her voice will get a little hoarse when this happens She thinks she has some allergies as well She has gained a lot of weight--but she says she doesn't eat much Wt up 28# Her thyroid has been borderline so this needs to be rechecked Had been drinking 3 bottles of wine a day. Has cut way back and drinks more water Review of Systems Constitutional: Positive for malaise/fatigue. Negative for chills and fever. Respiratory: Positive for shortness of breath. Cardiovascular: Positive for leg swelling. Negative for chest pain. Musculoskeletal:  
     Using walker today Skin:  
     Oozing on right lower leg Physical Exam  
Constitutional: She is oriented to person, place, and time. She appears well-developed and well-nourished. No distress. Neck: No JVD present. Cardiovascular: Normal rate and regular rhythm. Pulmonary/Chest: Effort normal and breath sounds normal. She has no rales. Musculoskeletal: She exhibits edema. Neurological: She is alert and oriented to person, place, and time. Diabetic foot exam:  
 
Left Foot: 
 Visual Exam: edema. Hammertoe and bunion Pulse DP: 1+ (weak) Filament test: reduced sensation Right Foot: 
 Visual Exam: edema Pulse DP: 1+ (weak) Filament test: reduced sensation Skin: Skin is warm and dry. She is not diaphoretic. Skin breakdown with ulceration of shin. No purulence noted. No odor Psychiatric: She has a normal mood and affect. Nursing note and vitals reviewed. ASSESSMENT and PLAN 
  ICD-10-CM ICD-9-CM 1. Type 2 diabetes mellitus with mild nonproliferative retinopathy without macular edema, without long-term current use of insulin, unspecified laterality ([facility].) K86.6995 264.73 METABOLIC PANEL, COMPREHENSIVE  
  362.04 HEMOGLOBIN A1C W/O EAG  
   MICROALBUMIN, UR, RAND W/ MICROALB/CREAT RATIO  
    DIABETES FOOT EXAM  
2. Type 2 diabetes with nephropathy (HCC) F92.29 926.41 METABOLIC PANEL, COMPREHENSIVE  
  583.81 HEMOGLOBIN A1C W/O EAG  
   MICROALBUMIN, UR, RAND W/ MICROALB/CREAT RATIO  
    DIABETES FOOT EXAM  
3. Essential hypertension H68 226.6 METABOLIC PANEL, COMPREHENSIVE  
   ECHO ADULT COMPLETE  
   XR CHEST PA LAT 4. Hypercholesterolemia E78.00 272.0 LIPID PANEL 5. hypothyroid E07.9 246.9 TSH AND FREE T4  
6. Severe obesity ([facility] E66.01 278.01   
7. Weight gain R63.5 783.1 TSH AND FREE T4  
8. Pedal edema R60.0 782.3 furosemide (LASIX) 20 mg tablet ECHO ADULT COMPLETE 9. Dyspnea, unspecified type R06.00 786.09 XR CHEST PA LAT Will add back some lasix for edema Elevate feet BP very high and pt states she takes her meds regularly But her heart is not pounding. Pulse is not bounding. Will check echocardiogram 
 
Update lab today--will do at the hospital 
Echo and CXR ordered. Will do at the hospital 
 
Keep leg wound clean and dry as much as possible. Keep legs elevated for the swelling Discussed BMI/weight, lifestyle, diet and exercise. Discussed effect on blood pressure, blood sugar, and joints especially Focus on limiting white carbs, portion control, and moving more.  
 
F/u 5 months for MWV, DM, HTN

## 2019-05-09 NOTE — PATIENT INSTRUCTIONS
WHEN THEY SCHEDULE YOU FOR THE ECHOCARDIOGRAM, YOU CAN ALSO GET YOUR CHEST XRAY DONE AT [facility]

## 2019-06-03 PROBLEM — L03.90 CELLULITIS: Status: ACTIVE | Noted: 2019-01-01

## 2019-06-03 PROBLEM — I73.9 PAD (PERIPHERAL ARTERY DISEASE) (HCC): Status: ACTIVE | Noted: 2019-01-01

## 2019-06-03 PROBLEM — J96.00 ACUTE RESPIRATORY FAILURE (HCC): Status: ACTIVE | Noted: 2019-01-01

## 2019-06-03 PROBLEM — E11.9 DIABETES (HCC): Status: ACTIVE | Noted: 2019-01-01

## 2019-06-03 PROBLEM — E16.2 HYPOGLYCEMIA: Status: ACTIVE | Noted: 2019-01-01

## 2019-06-03 PROBLEM — A41.9 SEPSIS (HCC): Status: ACTIVE | Noted: 2019-01-01

## 2019-06-03 NOTE — ROUTINE PROCESS
0730 Bedside report given to [address] Garret Castrejon (oncoming nurse) per Julian Harris RN (offgoing nurse) utilizing SBAR, MAR, Kardex, I&O, results review, and EKG interpretation with rate and rhythm.

## 2019-06-03 NOTE — PROCEDURES
[facility]  [facility] With Ul[facility]ound Guidance    Indication: Inadequate venous access    Risks, benefits, alternatives explained and consent obtained. Time out performed. Patient positioned in Trendelenburg. Central line Bundle:  Full sterile barrier precautions used. 7-Step Sterility Protocol followed. (cap, mask sterile gown, sterile gloves, large sterile sheet, hand hygiene, 2% chlorhexidine for cutaneous antisepsis)  5 mL 1% Lidocaine placed at insertion site. Using ultrasound guidance,   Right internal jugular vein cannulated x 1 attempt(s) utilizing the modified Seldinger technique. Position of guidewire confirmed in vein using ultrasound prior to dilating. Guidewire was removed. Central venous catheter was placed without difficulty. no immediate complications encountered. Good blood return on all 3 ports. Catheter secured & Biopatch applied. Sterile Tegaderm placed.       CXR pending    Caitlyn Chaudhari NP-BC     Pulmonary, Critical Care Medicine  [facility]        7:30 PM

## 2019-06-03 NOTE — CONSULTS
Pulmonary consultation    History  77-year-old woman found down by her son with altered mental status. She was hypoglycemic. Both lower extremities were erythematous with poor arterial pulses. She was started on BiPAP for respiratory distress. No family member is present to provide additional history    Review of systems  Cannot be completed    Past medical history  Morbid obesity  Diabetes mellitus  Hypertension  Hypercholesterolemia  Hypothyroidism  Gastroesophageal reflux disease  Lewy body dementia  Fibromyalgia  Rosacea  Cellulitis  Peripheral arterial disease: Moderate stenosis right common femoral artery; occluded right posterior tibial artery; occluded left distal popliteal, posterior tibial and anterior tibial arteries    Past surgical history  Cholecystectomy  Right shoulder surgery  Knee arthroscopic surgery  Carpal tunnel release    Allergies   Allergen Reactions    Latex Rash    Other Plant, Animal, Environmental Other (comments)     Surgical Steel, Related to ankle fracture and it caused bursitis and was removed. ELASTIC    Acetaminophen Other (comments)     Abdominal pain    Adhesive Hives     Pt also states she has burning and stinging    Morphine Other (comments)     Agitated, nasty    Statins-Hmg-Coa Reductase Inhibitors Other (comments)     Leg cramps    Sulfa (Sulfonamide Antibiotics) Rash    Tramadol Nausea Only     No current facility-administered medications on file prior to encounter.       Current Outpatient Medications on File Prior to Encounter   Medication Sig Dispense Refill    furosemide (LASIX) 20 mg tablet Take one tablet by mouth daily as needed for swelling  Indications: visible water retention 30 Tab 2    colesevelam (WELCHOL) 625 mg tablet TAKE THREE TABLETS BY MOUTH TWICE A DAY WITH MEALS INDICATIONS HETEROZYGOUS FAMILIAL HYPERCHOLESTEROLEMIA 180 Tab 3    glipiZIDE SR (GLUCOTROL XL) 5 mg CR tablet TAKE TWO TABLETS BY MOUTH EVERY MORNING AND TAKE ONE TABLET BY MOUTH EVERY EVENING WITH MEALS 270 Tab 3    omeprazole (PRILOSEC) 20 mg capsule Take 1 Cap by mouth daily. 90 Cap 3    metoprolol succinate (TOPROL-XL) 50 mg XL tablet Take 1 Tab by mouth daily. 90 Tab 5    levothyroxine (SYNTHROID) 112 mcg tablet Take 1 Tab by mouth Daily (before breakfast). 90 Tab 5    ammonium lactate (AMLACTIN) 12 % topical cream Apply  to affected area two (2) times a day. rub in to affected area well 140 g 5    ESTRACE 0.01 % (0.1 mg/gram) vaginal cream INSERT VAGINALLY 2 GRAMS DAILY 42.5 g 10    ORACEA 40 mg capsule daily.  MARYLOU ASPIRIN PO Take  by mouth daily. Indications: 1-2 tabs daily      Ibuprofen-diphenhydrAMINE (ADVIL PM) 200-38 mg tab Take 2 Tabs by mouth nightly.  etodolac (LODINE) 400 mg tablet TAKE ONE TABLET BY MOUTH DAILY (GENERIC LODINE) 30 Tab 4    naproxen-diphenhydramine 220-25 mg tab Take  by mouth. Social history  Former smoker    Family history  Unavailable    Exam  Difficult to interact with because of BiPAP  Tmin 93.2. HR min 45. Hypertensive. Respiratory rate in teens. Saturating in upper 90s on BiPAP  Diffuse pitting edema  Conjugate gaze. Sclera anicteric. Tolerating BiPAP  Coarse breath sounds bilaterally  Heart tones difficult to hear due to respiratory sounds, but rate appears regular. Abdomen soft and nondistended  Bilateral lower extremities are edematous. The right lower extremity is warm while the left one is slightly cool. Some of the erythema appears to be chronic bilaterally. Neither leg appears to be particularly tender to palpation. Both legs have significant pitting edema, however. There is a weeping wound on the right tibia. There are no palpable pulses on the left leg. WBC 15.2, hemoglobin 11.9 and platelets 145. Sodium 130, creatinine 1.40. Albumin 3.1. NT proBNP 12,804.   Echocardiogram pending    VB     Blood cultures negative to date    Chest x-ray with retrocardiac consolidation and left pleural effusion. Assessment  Altered mental status  Bilateral lower extremity cellulitis  Bilateral lower extremity peripheral arterial disease  Left lower extremity ischemia  Diffuse edema and volume overload  Persistent hypoglycemia  Hypervolemia hyponatremia    The patient's mental status changes appear to be driven by a combination of acute respiratory failure, lower extremity infection, and hypoglycemia. I suspect that she inadvertently took a greater dose of glipizide. If her lower extremities cannot be bypassed, she may require amputation--if not now, in the near future. Plan  Antibiotics  Vascular surgery consultation  Diuresis    The patient is critically ill with organ failure. Given her baseline poor health and the significant nature of her current illnesses, she is at high risk for death.   Total critical care time without physician overlap or procedure time included: 75 minutes

## 2019-06-03 NOTE — PROGRESS NOTES
Respiratory Therapy Assessment Care Plan    Patient:  Green Lipoma [de-identified] y.o. female 6/3/2019 9:13 AM    Hypoglycemia [E16.2]  PAD (peripheral artery disease) ([facility] [address] [I73.9]  Diabetes ([facility] [E11.9]      Chest X-RAY:   Results from Hospital Encounter encounter on 06/03/19   XR PELV 1 OR 2 V    Impression IMPRESSION:  Rotated projection of the pelvis with degenerative changes as  described. No superimposed acute radiographic abnormality. XR FEMUR RT 2 VS    Impression IMPRESSION:    Degenerative changes involving the right femur as described without radiographic  evidence of acute abnormality. XR CHEST PORT    Impression IMPRESSION:    Small left pleural effusion and adjacent left basilar atelectasis/infiltrate.               Vital Signs:   Visit Vitals  /78   Pulse 79   Temp 98.4 °F (36.9 °C)   Resp 17   Ht 5' 5\" (1.651 m)   Wt 113.4 kg (250 lb)   SpO2 97%   BMI 41.60 kg/m²           Indications for treatment: Hypercarbia      Plan of care: Bipap, as needed         Goal: Improve gas exchange

## 2019-06-03 NOTE — PROGRESS NOTES
-0800-Received patient on Bipap 16/8, FIO2-50%. O2 sats-96% HR-64, RR-17. Pt. Is responsive. Respiratory will continue to monitor. -[facility]    -1540-Pt. Noted unresponsive secondary to blood sugar( unreadable) ABG attempted without success.   Will obtain [facility]

## 2019-06-03 NOTE — H&P
History and Physical    Patient: Kerline Garcia               Sex: female          DOA: 6/3/2019       YOB: 1940      Age:  [de-identified] y.o.        LOS:  LOS: 0 days        HPI:     Kerline Garcia is a [de-identified] y.o. female who was found down by her son. She had weakness and altered mentals status. She also had SOB. She has a known history of Diabetes. In the ER she was also found to have hypoglycemia. She is noted to have decreased pulses bilaterally. She also was found to have significant respiratory distress and was started on BiPap. Vascular surgery was called because of decreased perfusion to both feet. She has severe erythema involving both feet as well as her abdomen and below her breast.  There is significant concern for sepsis along with her acute respiratory failure. She is admitted for ongoing management. Past Medical History:   Diagnosis Date    AC joint arthropathy     right    Acne rosacea     Bursitis of shoulder     rotator cuff    Burst blood vessel in eye, left 02/2018    ruptured blood vessel left     Diabetes (HCC)     Diabetic retinopathy (HCC)     severe, non-proliferative    Discoid lupus     Duodenal ulcer     Facet joint disease     cervical spine, injections    Fibromyalgia     Hiatal hernia     Hypercholesterolemia     Hypertension     hypothyroid     Impingement syndrome of shoulder     left    Lumbar scoliosis     Nonexudative age-related macular degeneration     severe    Open-angle glaucoma     severe    Rectocele        Social History:   Tobacco use:  Patient does not use tobacco   Alcohol use:  Patient does not use alcohol   Patient lives alone    Family History:   Multiple family members with HTN    Review of Systems    Constitutional:  Weakness and altered mental status as above  HEENT:  No headache or visual changes  Cardiovascular:  No chest pain or diaphoresis  Respiratory:  No coughing, wheezing, or shortness of breath.   GI:  No nausea or vomitting. No diarrhea  :  No hematuria or dysuria  Skin:  Erythema involving both lower extremities. Decreased pulses both legs  Neuro:  No seizures or syncope  Hematological:  No bruising or bleeding  Endocrine:  Patient has known diabetes, no thyroid disease    Physical Exam:      Visit Vitals  BP (!) 204/62   Pulse 79   Temp 97.9 °F (36.6 °C)   Resp 16   Ht 5' 5\" (1.651 m)   Wt 113.4 kg (250 lb)   SpO2 94%   BMI 41.60 kg/m²       Physical Exam:    Gen:  Patient is confused and has decreased mental status. Tolerating BiPap well. HEENT:  Normal cephalic atraumatic, extra-occular movements are intact. Neck:  Supple, No JVD  Lungs:  Clear bilaterally, no wheeze, no rales, normal effort  Heart:  Regular Rate and Rhythm, normal S1 and S2, no edema  Abdomen:  Soft, non tender, normal bowel sounds, no guarding. Extremities:  Decreased pulses both feet.     Neurological:  Awake and alert, CN's are intact, normal strength throughout extremities  Skin:  Severe erythema involving both legs, also abdomen and below left breast.  Mental Status:  Normal thought process, does not appear anxious    Laboratory Studies:    BMP:   Lab Results   Component Value Date/Time     (L) 06/03/2019 12:58 AM    K 4.8 06/03/2019 12:58 AM    CL 99 (L) 06/03/2019 12:58 AM    CO2 26 06/03/2019 12:58 AM    AGAP 5 06/03/2019 12:58 AM     (H) 06/03/2019 12:58 AM    BUN 27 (H) 06/03/2019 12:58 AM    CREA 1.40 (H) 06/03/2019 12:58 AM    GFRAA 44 (L) 06/03/2019 12:58 AM    GFRNA 36 (L) 06/03/2019 12:58 AM     CBC:   Lab Results   Component Value Date/Time    WBC 15.2 (H) 06/03/2019 12:58 AM    HGB 11.9 (L) 06/03/2019 12:58 AM    HCT 38.4 06/03/2019 12:58 AM     06/03/2019 12:58 AM       Assessment/Plan     Principal Problem:    Sepsis ([facility] [address] (6/3/2019)    Active Problems:    Hypoglycemia (6/3/2019)      PAD (peripheral artery disease) ([facility] (6/3/2019)      Acute respiratory failure ([facility] (6/3/2019)      Cellulitis (6/3/2019)      Essential hypertension (9/3/2015)      Type 2 diabetes with nephropathy ([facility] [address] (3/23/2018)      Diabetes ([facility] (6/3/2019)      hypothyroid ()      Severe obesity ([facility] (5/9/2019)        PLAN:    Broad spectrum antibiotics  Critical Care consult  Continue with BiPap  Vascular surgery consulted from ER, patient started on heparin  BP control  BS control  Follow renal function  DVT prophylaxis  Patient is critically ill with high risk of deterioration.

## 2019-06-03 NOTE — CONSULTS
[facility]  Pulmonary, Critical Care, and Sleep Medicine    Name: Andra Hernández MRN: [id number]   : 1940 Hospital: [facility]   Date: 6/3/2019        Critical Care Consult      IMPRESSION:   · Acute Respiratory Failure  · Acute CHF Exac  · Severe BLE Peripheral Artery Disease with Arterial Occlusion- Complete occlusion of LLE, near occlusion on R ICA.   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 ([facility]) E11.3299    Type 2 diabetes with nephropathy ([facility]) E11.21    Severe obesity ([facility]) E66.01    Diabetes ([facility]) E11.9    Hypoglycemia E16.2    PAD (peripheral artery disease) ([facility]) I73.9    Sepsis ([facility]) A41.9    Acute respiratory failure ([facility]) J96.00    Cellulitis L03.90        RECOMMENDATIONS:   · Resp:   -Pt on BiPap for resp failure and chf exac  -Will utilize diuretics  -Pulmonary Hygiene  -Daily CXR  -Daily ABG's  · I/D:    -Afebrile; aleukocytosis;    -LA obtained and was WNL   -ABX : Vanc/Zosyn  · Hem/Onc:    -Daily CBC; H/H, and plts are stable  -On heparin gtt  · CVS:    -Near R ICA occlusion, Complete LLE occlusion  -Vascular following, plan for OR  for now  -Monitor Hemodynamics, stable for now    -Will diurese with Lasix BID for now  · Metabolic:    -Daily BMP   -monitor e-lytes; replace PRN  · Renal:    -Trend Renal indices;   -Jackson catheter / Diuresis (Bumex)  · Endocrine:   -Severe hypoglycemia  -C-Peptide to see if hypoglycemia is exogenous or endogenous  -250mL of D10% bolus was administered   -Now receiveing D10% @ 200ml/hr  -Will consider switching to D20  -Q1 hr Blood sugar checks x 4 hrs then Q2 x 8 times.  · GI:   -SUP- Protonix  - Zofran PRN for N/V   -Known hernia  · Musc/Skin:   -Vasculitis/ Cellulitis-like presentation of bilateral lower extremities  · Neuro:   -PRN pain medications  · Fluids: D10@ 200/hr for now  · Code Status:     Best practice:  · Sepsis Bundle per Hospital Protocol  · Glycemic control; avoid Hypoglycemia  · IHI ICU Bundles:  ·  Lozoya Bundle Followed    · Mech Vent patients/ Pulmonary pts:   · VAP bundle, Aim to keep peak plateau pressure 89-49PE H2O  · Aspiration Precautions - HOB >30'  · Daily sedation holiday as indicated  · SBT as tolerated/appropriate  · Stress ulcer prophylaxis. Protonix   · DVT prophylaxis. Heparin gtt  · Need for Lines, lozoya assessed. · Restraints need. · Palliative care evaluation. Subjective/History:       Patient is a [de-identified] y.o. female  With PMHx of HTN, Severe PVD, DM, Colitis, and Thyroid Disease presented to [facility] ICU for acute metabolic encephalopathy. Patient reportedly was found unresponsive by family members. She was brought in to [facility] ED, and was found to have severe hypoglycemia, in addition to BLE swelling with feet discoloration. Patient required BiPap for CHF exacerbation, and is on abx. She will be consulted by ICU team for sepsis, respiratory failure, and severe hypoglycemia.       Past Medical History:   Diagnosis Date    AC joint arthropathy     right    Acne rosacea     Bursitis of shoulder     rotator cuff    Burst blood vessel in eye, left 02/2018    ruptured blood vessel left     Diabetes (HCC)     Diabetic retinopathy (HCC)     severe, non-proliferative    Discoid lupus     Duodenal ulcer     Facet joint disease     cervical spine, injections    Fibromyalgia     Hiatal hernia     Hypercholesterolemia     Hypertension     hypothyroid     Impingement syndrome of shoulder     left    Lumbar scoliosis     Nonexudative age-related macular degeneration     severe    Open-angle glaucoma     severe    Rectocele         Past Surgical History:   Procedure Laterality Date    COLONOSCOPY  2007    polyps, Dr. Mo Almaraz HX CHOLECYSTECTOMY  2007    HX KNEE ARTHROSCOPY      HX SHOULDER ARTHROSCOPY      right        Prior to Admission medications    Medication Sig Start Date End Date Taking? Authorizing Provider   furosemide (LASIX) 20 mg tablet Take one tablet by mouth daily as needed for swelling  Indications: visible water retention 5/9/19   Steve Mccormack MD   [facility]) 625 mg tablet TAKE THREE TABLETS BY MOUTH TWICE A DAY WITH MEALS INDICATIONS HETEROZYGOUS FAMILIAL HYPERCHOLESTEROLEMIA 4/8/19   Steve Mccormack MD   glipiZIDE SR (GLUCOTROL XL) 5 mg CR tablet TAKE TWO TABLETS BY MOUTH EVERY MORNING AND TAKE ONE TABLET BY MOUTH EVERY EVENING WITH MEALS 11/25/18   Steve Mccormack MD   omeprazole (PRILOSEC) 20 mg capsule Take 1 Cap by mouth daily. 11/25/18   Steve Mccormack MD   metoprolol succinate (TOPROL-XL) 50 mg XL tablet Take 1 Tab by mouth daily. 9/20/18   Steve Mccormack MD   levothyroxine (SYNTHROID) 112 mcg tablet Take 1 Tab by mouth Daily (before breakfast). 9/20/18   Steve Mccormack MD   ammonium lactate (AMLACTIN) 12 % topical cream Apply  to affected area two (2) times a day. rub in to affected area well 9/20/18   Steve Mccormack MD   ESTRACE 0.01 % (0.1 mg/gram) vaginal cream INSERT VAGINALLY 2 GRAMS DAILY 7/18/18   Steve Mccormack MD   ORACEA 40 mg capsule daily. 2/13/18   Provider, Historical   MARYLOU ASPIRIN PO Take  by mouth daily. Indications: 1-2 tabs daily    Provider, Historical   Ibuprofen-diphenhydrAMINE (ADVIL PM) 200-38 mg tab Take 2 Tabs by mouth nightly. Provider, Historical   etodolac (LODINE) 400 mg tablet TAKE ONE TABLET BY MOUTH DAILY (GENERIC LODINE) 9/21/16   Steve Mccormack MD   naproxen-diphenhydramine 220-25 mg tab Take  by mouth.     Provider, Historical       Current Facility-Administered Medications   Medication Dose Route Frequency    dextrose 10 % infusion        heparin 25,000 units in D5W 250 ml infusion  18-36 Units/kg/hr IntraVENous TITRATE    levothyroxine (SYNTHROID) tablet 112 mcg  112 mcg Oral ACB    piperacillin-tazobactam (ZOSYN) 3.375 g in 0.9% sodium chloride (MBP/ADV) 100 mL MBP  3.375 g IntraVENous Q8H    [START ON 2019] vancomycin (VANCOCIN) 1500 mg in  ml infusion  1,500 mg IntraVENous Q24H    [START ON 2019] VANCOMYCIN INFORMATION NOTE   Other ONCE    VANCOMYCIN INFORMATION NOTE 1 Each  1 Each Other Rx Dosing/Monitoring    furosemide (LASIX) injection 40 mg  40 mg IntraVENous BID    pantoprazole (PROTONIX) injection 40 mg  40 mg IntraVENous DAILY       Allergies   Allergen Reactions    Latex Rash    Other Plant, Animal, Environmental Other (comments)     Surgical Steel, Related to ankle fracture and it caused bursitis and was removed. ELASTIC    Acetaminophen Other (comments)     Abdominal pain    Adhesive Hives     Pt also states she has burning and stinging    Morphine Other (comments)     Agitated, nasty    Statins-Hmg-Coa Reductase Inhibitors Other (comments)     Leg cramps    Sulfa (Sulfonamide Antibiotics) Rash    Tramadol Nausea Only        Social History     Tobacco Use    Smoking status: Never Smoker    Smokeless tobacco: Never Used   Substance Use Topics    Alcohol use: Yes     Comment: Rarely        Family History   Problem Relation Age of Onset    No Known Problems Mother     No Known Problems Father           Review of Systems:  A comprehensive review of systems was negative.     Objective:   Vital Signs:    Visit Vitals  /83   Pulse 67   Temp 98.4 °F (36.9 °C)   Resp 20   Ht 5' 5\" (1.651 m)   Wt 113.4 kg (250 lb)   SpO2 100%   BMI 41.60 kg/m²       O2 Device: BIPAP       Temp (24hrs), Av.8 °F (35.4 °C), Min:93.2 °F (34 °C), Max:98.4 °F (36.9 °C)       Intake/Output:   Last shift:      No intake/output data recorded. Last 3 shifts: 06/01 1901 - 06/03 0700  In: 641.1 [I.V.:641.1]  Out: 50 [Urine:50]    Intake/Output Summary (Last 24 hours) at 6/3/2019 1520  Last data filed at 6/3/2019 0700  Gross per 24 hour   Intake 641.08 ml   Output 50 ml   Net 591.08 ml       Ventilator Settings:  Mode Rate Tidal Volume Pressure FiO2 PEEP            45 %(decreased at this time. O2 Sats 100% prior)       Peak airway pressure:      Minute ventilation: 6.5 l/min      ARDS network Guidelines:   Lung protective strategy and Plateau  Pressure goal < 30 cm H2O goals  Oxygenation Goals PaO2 55-80 mm Hg or SaO2 88-95%  PH goal 7.30-7.45    VAP bundle:  Reviewed. [place] tube to suction at 20-30 cm Hg. Maintain Raven tube with 5-10ml air every 4 hours  Routine oral care every 4 hours  Elevation of head > 45 degree  Daily sedation holiday and SBT evaluation starting at 6.00am.    Physical Exam:     General:  Somnolent, becomes obtunded with hypoglycemia   Head:  Normocephalic, without obvious abnormality, atraumatic. Eyes:  R eye periorbital edema with ptosis   Nose: Nares normal. Septum midline. Mucosa normal. No drainage or sinus tenderness. Throat: Yellow teeth, dry mucuous membrane   Neck: Supple, symmetrical, trachea midline, no adenopathy, no carotid bruit and no JVD. Lungs:   Symmetrical chest rise; good AE bilat; CTAB; no wheezes/rhonchi/rales noted. Heart:  RRR, S1, S2 normal, no m/r/g   Abdomen:   Soft, abd hernia with slight distention. Bowel sounds normal. No masses,  No organomegaly. Extremities: Extremities normal, atraumatic, no cyanosis or edema. Pulses: 2+ and symmetric all extremities. Skin: BLE toes cool to touch with purple discoloration. BLE ant tib cellulitis. RLE ant tib small multiple superficial ulcerations with slough at base. Serosanguinous drainage noted. RLE lateral/mid thigh erythematous demarcated area, warm to touch. Palpable 2+ BLE femoral pulses. Nonpalpable BLE DP/PT pulses. NTTP BLE. Palpable 2+ BUE radial pulses   Neurologic: Grossly nonfocal     Devices:  · ETT: None  · OGT: None  · Lines: None  · Drains: None  · Jackson: yes    Data:     Recent Results (from the past 24 hour(s))   GLUCOSE, POC    Collection Time: 06/03/19 12:39 AM   Result Value Ref Range    Glucose (POC) 57 (L) 70 - 110 mg/dL   CULTURE, BLOOD    Collection Time: 06/03/19 12:52 AM   Result Value Ref Range    Special Requests: NO SPECIAL REQUESTS      Culture result: NO GROWTH AFTER 6 HOURS     METABOLIC PANEL, COMPREHENSIVE    Collection Time: 06/03/19 12:58 AM   Result Value Ref Range    Sodium 130 (L) 136 - 145 mmol/L    Potassium 4.8 3.5 - 5.5 mmol/L    Chloride 99 (L) 100 - 108 mmol/L    CO2 26 21 - 32 mmol/L    Anion gap 5 3.0 - 18 mmol/L    Glucose 289 (H) 74 - 99 mg/dL    BUN 27 (H) 7.0 - 18 MG/DL    Creatinine 1.40 (H) 0.6 - 1.3 MG/DL    BUN/Creatinine ratio 19 12 - 20      GFR est AA 44 (L) >60 ml/min/1.73m2    GFR est non-AA 36 (L) >60 ml/min/1.73m2    Calcium 8.5 8.5 - 10.1 MG/DL    Bilirubin, total 0.8 0.2 - 1.0 MG/DL    ALT (SGPT) 19 13 - 56 U/L    AST (SGOT) 34 15 - 37 U/L    Alk. phosphatase 83 45 - 117 U/L    Protein, total 7.3 6.4 - 8.2 g/dL    Albumin 3.1 (L) 3.4 - 5.0 g/dL    Globulin 4.2 (H) 2.0 - 4.0 g/dL    A-G Ratio 0.7 (L) 0.8 - 1.7     CBC WITH AUTOMATED DIFF    Collection Time: 06/03/19 12:58 AM   Result Value Ref Range    WBC 15.2 (H) 4.6 - 13.2 K/uL    RBC 4.24 4.20 - 5.30 M/uL    HGB 11.9 (L) 12.0 - 16.0 g/dL    HCT 38.4 35.0 - 45.0 %    MCV 90.6 74.0 - 97.0 FL    MCH 28.1 24.0 - 34.0 PG    MCHC 31.0 31.0 - 37.0 g/dL    RDW 19.3 (H) 11.6 - 14.5 %    PLATELET 380 118 - 144 K/uL    MPV 9.6 9.2 - 11.8 FL    NEUTROPHILS 84 (H) 40 - 73 %    LYMPHOCYTES 8 (L) 21 - 52 %    MONOCYTES 8 3 - 10 %    EOSINOPHILS 0 0 - 5 %    BASOPHILS 0 0 - 2 %    ABS. NEUTROPHILS 12.8 (H) 1.8 - 8.0 K/UL    ABS. LYMPHOCYTES 1.2 0.9 - 3.6 K/UL    ABS. MONOCYTES 1.2 0.05 - 1.2 K/UL    ABS. EOSINOPHILS 0.0 0.0 - 0.4 K/UL    ABS. BASOPHILS 0.0 0.0 - 0.1 K/UL    DF AUTOMATED     TSH 3RD GENERATION    Collection Time: 06/03/19 12:58 AM   Result Value Ref Range    TSH 4.59 (H) 0.36 - 3.74 uIU/mL   T4, FREE    Collection Time: 06/03/19 12:58 AM   Result Value Ref Range    T4, Free 1.5 0.7 - 1.5 NG/DL   T3, FREE    Collection Time: 06/03/19 12:58 AM   Result Value Ref Range    Triiodothyronine (T3), free 1.0 (L) 2.18 - 3.98 PG/ML   NT-PRO BNP    Collection Time: 06/03/19 12:58 AM   Result Value Ref Range    NT pro-BNP 12,804 (H) 0 - 1,800 PG/ML   PTT    Collection Time: 06/03/19 12:58 AM   Result Value Ref Range    aPTT 46.1 (H) 23.0 - 36.4 SEC   CARDIAC PANEL,(CK, CKMB & TROPONIN)    Collection Time: 06/03/19 12:58 AM   Result Value Ref Range     (H) 26 - 192 U/L    CK - MB 7.8 (H) <3.6 ng/ml    CK-MB Index 2.8 0.0 - 4.0 %    Troponin-I, QT 0.03 0.0 - 0.045 NG/ML   POC LACTIC ACID    Collection Time: 06/03/19 12:59 AM   Result Value Ref Range    Lactic Acid (POC) 1.59 0.40 - 2.00 mmol/L   POC CHEM8    Collection Time: 06/03/19  1:00 AM   Result Value Ref Range    CO2, POC 24 19 - 24 MMOL/L    Glucose,  (H) 74 - 106 MG/DL    BUN, POC 28 (H) 7 - 18 MG/DL    Creatinine, POC 1.3 0.6 - 1.3 MG/DL    GFRAA, POC 48 (L) >60 ml/min/1.73m2    GFRNA, POC 39 (L) >60 ml/min/1.73m2    Sodium,  (L) 136 - 145 MMOL/L    Potassium, POC 4.7 3.5 - 5.5 MMOL/L    Calcium, ionized (POC) 1.31 1.12 - 1.32 mmol/L    Chloride, POC 98 (L) 100 - 108 MMOL/L    Anion gap, POC 16 10 - 20      Hematocrit, POC 43 36 - 49 %    Hemoglobin, POC 14.6 12 - 16 G/DL   POC VENOUS BLOOD GAS    Collection Time: 06/03/19  1:18 AM   Result Value Ref Range    Device: NASAL CANNULA      Flow rate (POC) 4.0 L/M    pH, venous (POC) 7.132 (LL) 7.32 - 7.42      pCO2, venous (POC) 70.6 (H) 41 - 51 MMHG    pO2, venous (POC) 18 (L) 25 - 40 mmHg    HCO3, venous (POC) 23.6 23.0 - 28.0 MMOL/L    sO2, venous (POC) 17 (L) 65 - 88 %    Base deficit, venous (POC) 6 mmol/L    Allens test (POC) N/A      Total resp.  rate 20      Site OTHER      Specimen type (POC) VENOUS BLOOD      Performed by Nathan Chiang    EKG, 12 LEAD, INITIAL    Collection Time: 06/03/19  1:57 AM   Result Value Ref Range    Ventricular Rate 59 BPM    Atrial Rate 63 BPM    QRS Duration 116 ms    Q-T Interval 444 ms    QTC Calculation (Bezet) 439 ms    Calculated R Axis -12 degrees    Calculated T Axis -36 degrees    Diagnosis       Atrial fibrillation with slow ventricular response  Right bundle branch block  Abnormal ECG  When compared with ECG of 16-OCT-2012 16:02,  Atrial fibrillation has replaced Sinus rhythm  Baseline artifact present  Confirmed by Farnaz Andersen ([id number]) on 6/3/2019 11:42:44 AM     GLUCOSE, POC    Collection Time: 06/03/19  2:46 AM   Result Value Ref Range    Glucose (POC) 84 70 - 110 mg/dL   GLUCOSE, POC    Collection Time: 06/03/19  4:25 AM   Result Value Ref Range    Glucose (POC) 72 70 - 110 mg/dL   URINALYSIS W/ RFLX MICROSCOPIC    Collection Time: 06/03/19  6:50 AM   Result Value Ref Range    Color DARK YELLOW      Appearance TURBID      Specific gravity 1.019 1.005 - 1.030      pH (UA) 5.0 5.0 - 8.0      Protein 100 (A) NEG mg/dL    Glucose NEGATIVE  NEG mg/dL    Ketone NEGATIVE  NEG mg/dL    Bilirubin NEGATIVE  NEG      Blood LARGE (A) NEG      Urobilinogen 0.2 0.2 - 1.0 EU/dL    Nitrites NEGATIVE  NEG      Leukocyte Esterase MODERATE (A) NEG     URINE MICROSCOPIC ONLY    Collection Time: 06/03/19  6:50 AM   Result Value Ref Range    WBC 11 to 20 0 - 4 /hpf    RBC TOO NUMEROUS TO COUNT 0 - 5 /hpf    Epithelial cells 2+ 0 - 5 /lpf    Bacteria 4+ (A) NEG /hpf    Mucus 2+ (A) NEG /lpf    Amorphous Crystals 1+ (A) NEG   TROPONIN I    Collection Time: 06/03/19  8:19 AM   Result Value Ref Range    Troponin-I, QT 0.07 (H) 0.0 - 0.045 NG/ML   PTT    Collection Time: 06/03/19  9:20 AM   Result Value Ref Range    aPTT >180.0 (HH) 23.0 - 36.4 SEC   DUPLEX LOWER EXT ARTERY BILAT    Collection Time: 06/03/19 9:56 AM   Result Value Ref Range    Right CFA dist sys .3 cm/s    Right super femoral dist sys PSV 67.8 cm/s    Right super femoral mid sys PSV 92.3 cm/s    Right super femoral prox sys .8 cm/s    Right popliteal dist sys PSV 81.6 cm/s    Right popliteal mid sys PSV 69.5 cm/s    Right popliteal prox sys PSV 57.4 cm/s    Right Dist ERIKA Velocity 38.7 cm/s    Right Mid ERIKA Velocity 54.1 cm/s    Right Prox ERIKA Velocity 88.1 cm/s    Right Dist PTA PSV 0.0 cm/s    Right mid PTA sys PSV 0.0 cm/s    Right Prox PTA PSV 0.0 cm/s    Left CFA dist sys .2 cm/s    Left super femoral dist sys PSV 69.8 cm/s    Left super femoral mid sys PSV 58.9 cm/s    Left super femoral prox sys .5 cm/s    Left popliteal prox sys .8 cm/s    Right SFA Prox Cristi Ratio 0.5     Right SFA Mid Cristi Ratio 0.7     Right SFA Dist Cristi Ratio 0.73     Right Pop A Prox Cristi Ratio 0.85     Right Pop A Mid Cristi Ratio 1.21     Right Pop A Dist Cristi Ratio 1.17     Left SFA Prox Cristi Ratio 0.80     Left SFA Mid Cristi Ratio 0.56     Left SFA Dist Cristi Ratio 1.19     Left Pop A Prox Cristi Ratio 1.44     Left Pop A Mid Cristi Ratio 0.65     Left Prox PFA A PSV 46.1 cm/s    Right Prox PFA A .1 cm/s    Left popliteal mid sys PSV 65.1 cm/s   DUPLEX CAROTID BILATERAL    Collection Time: 06/03/19 10:55 AM   Result Value Ref Range    Right cca dist sys 74.2 cm/s    Right CCA dist dunaway 6.4 cm/s    RIGHT COMMON CAROTID ARTERY MID S 65.10 cm/s    RIGHT COMMON CAROTID ARTERY MID D 7.70 cm/s    Right CCA prox sys 58.6 cm/s    Right CCA prox dunaway 9.0 cm/s    Right eca sys 285.7 cm/s    RIGHT EXTERNAL CAROTID ARTERY D 0.00 cm/s    Right ICA dist sys 30.3 cm/s    Right ICA dist dunaway 6.2 cm/s    Right ICA mid sys 21.1 cm/s    Right ICA mid dunaway 0.0 cm/s    Right vertebral sys 75.5 cm/s    RIGHT VERTEBRAL ARTERY D 15.50 cm/s    Right ICA/CCA sys 0.4     Left CCA dist sys 125.8 cm/s    Left CCA dist dunaway 23.6 cm/s    LEFT COMMON CAROTID ARTERY MID S 87.50 cm/s    LEFT COMMON CAROTID ARTERY MID D 23.60 cm/s    Left CCA prox sys 125.8 cm/s    Left CCA prox dunaway 27.2 cm/s    Left ICA dist sys 157.7 cm/s    Left ICA dist dunaway 59.3 cm/s    Left ICA mid sys 183.6 cm/s    Left ICA mid dunaway 49.0 cm/s    Left ICA prox sys 186.2 cm/s    Left ICA prox dunaway 51.5 cm/s    Left vertebral sys 95.5 cm/s    LEFT VERTEBRAL ARTERY D 17.80 cm/s    Left ICA/CCA sys 1.50     Right subclavian sys 213.0 cm/s    Left ECA sys 118.9 cm/s    Left subclavian sys 246.2 cm/s    LEFT SUBCLAVIAN ARTERY D 0.0 cm/s    LEFT EXTERNAL CAROTID ARTERY D 0.0 cm/s    RIGHT SUBCLAVIAN ARTERY D 0.0 cm/s   ECHO ADULT COMPLETE    Collection Time: 06/03/19  2:06 PM   Result Value Ref Range    PASP 46.0 mmHg   GLUCOSE, POC    Collection Time: 06/03/19  3:08 PM   Result Value Ref Range    Glucose (POC) <30.0 (LL) 70 - 110 mg/dL           No results for input(s): FIO2I, IFO2, HCO3I, IHCO3, HCOPOC, PCO2I, PCOPOC, IPHI, PHI, PHPOC, PO2I, PO2POC in the last 72 hours. No lab exists for component: IPOC2    Telemetry:normal sinus rhythm    Imaging:  I have personally reviewed the patients radiographs and have reviewed the reports:    CXR [date]:    CXR Results  (Last 48 hours)               06/03/19 0051  XR CHEST PORT Final result    Impression:  IMPRESSION:       Small left pleural effusion and adjacent left basilar atelectasis/infiltrate. Narrative:  EXAM: CHEST RADIOGRAPH, SINGLE VIEW       CLINICAL INDICATION/HISTORY: Sepsis, fever       COMPARISON: None. TECHNIQUE: Portable frontal view of the chest was obtained.        _______________       FINDINGS:       SUPPORT DEVICES: None. HEART AND MEDIASTINUM: Cardiomediastinal silhouette appears within normal   limits. Normal caliber thoracic aorta. No central vascular congestion. LUNGS AND PLEURAL SPACES: Patchy opacity is seen throughout retrocardiac left   lower lobe silhouetting adjacent left hemidiaphragm.  Left mid and upper lung   zones and right lung remain well aerated. No definite right pleural effusion. No pneumothorax. BONY THORAX AND SOFT TISSUES: No acute osseous abnormality. _______________                   CT HEAD/CHEST/ABD/PELVIS [date]: Total of  60   min critical care time spent at bedside during the course of care providing evaluation,management and care decisions and ordering appropriate treatment related to critical care problems exclusive of procedures. The reason for providing this level of medical care for this critically ill patient was due a critical illness that impaired one or more vital organ systems such that there was a high probability of imminent or life threatening deterioration in the patients condition. This care involved high complexity decision making to assess, manipulate, and support vital system functions, to treat this degree vital organ system failure and to prevent further life threatening deterioration of the patients condition.         Caitlyn Chaudhari NP-BC     Pulmonary, Critical Care Medicine  [facility]

## 2019-06-03 NOTE — PROGRESS NOTES
Full note in am.  Came from Dr. Ryan Captain overnight. ICU team contacted for consult by him. Vascular consulted and recommending hep drip and imaging. F/u trop, get echo, d5 fluids.

## 2019-06-03 NOTE — ED PROVIDER NOTES
Milly Garay is a [de-identified] y.o. Female with history of non-insulin-dependent diabetes,  chronic edema, who was noted to be unresponsive per son and called EMS. Upon arrival EMS noted patient was very cool and not responsive with a blood sugar of 23. Patient had a intraosseous line placed and given D50. Sugar did come up. Patient did become more responsive. Patient states she has been taking her medication as directed. She is on insulin. She denies any recent vomiting. She is recently seen her doctor and may and was started on Lasix for increased swelling her lower legs. Patient has chronic seborrheic dermatitis. She denies any recent productive cough or fever or vomiting. She denies any change in appetite. Per son the patient has fallen a couple times in the last 2 to 3 weeks. She has not been seen for this yet. Son states the bruising on her right thigh has been present for a few days. Son also states her feet have looked very blue and discolored for the last few days as well. The history is provided by the patient, the EMS personnel, medical records and a relative.         Past Medical History:   Diagnosis Date    AC joint arthropathy     right    Acne rosacea     Bursitis of shoulder     rotator cuff    Burst blood vessel in eye, left 02/2018    ruptured blood vessel left     Diabetes (HCC)     Diabetic retinopathy (HCC)     severe, non-proliferative    Discoid lupus     Duodenal ulcer     Facet joint disease     cervical spine, injections    Fibromyalgia     Hiatal hernia     Hypercholesterolemia     Hypertension     hypothyroid     Impingement syndrome of shoulder     left    Lumbar scoliosis     Nonexudative age-related macular degeneration     severe    Open-angle glaucoma     severe    Rectocele        Past Surgical History:   Procedure Laterality Date    COLONOSCOPY  2007    polyps, Dr. Kaitlynn Joaquin HX CHOLECYSTECTOMY  2007    HX KNEE ARTHROSCOPY      HX SHOULDER ARTHROSCOPY      right         Family History:   Problem Relation Age of Onset    No Known Problems Mother     No Known Problems Father        Social History     Socioeconomic History    Marital status:      Spouse name: Not on file    Number of children: Not on file    Years of education: Not on file    Highest education level: Not on file   Occupational History    Not on file   Social Needs    Financial resource strain: Not on file    Food insecurity:     Worry: Not on file     Inability: Not on file    Transportation needs:     Medical: Not on file     Non-medical: Not on file   Tobacco Use    Smoking status: Never Smoker    Smokeless tobacco: Never Used   Substance and Sexual Activity    Alcohol use: Yes     Comment: Rarely    Drug use: No    Sexual activity: Never   Lifestyle    Physical activity:     Days per week: Not on file     Minutes per session: Not on file    Stress: Not on file   Relationships    Social connections:     Talks on phone: Not on file     Gets together: Not on file     Attends Jainism service: Not on file     Active member of club or organization: Not on file     Attends meetings of clubs or organizations: Not on file     Relationship status: Not on file    Intimate partner violence:     Fear of current or ex partner: Not on file     Emotionally abused: Not on file     Physically abused: Not on file     Forced sexual activity: Not on file   Other Topics Concern    Not on file   Social History Narrative    Not on file         ALLERGIES: Latex; Other plant, animal, environmental; Acetaminophen; Adhesive; Morphine; Statins-hmg-coa reductase inhibitors; Sulfa (sulfonamide antibiotics); and Tramadol    Review of Systems   Constitutional: Positive for chills. HENT: Negative for trouble swallowing. Eyes: Negative for visual disturbance. Respiratory: Negative for shortness of breath. Cardiovascular: Positive for leg swelling. Negative for chest pain. Gastrointestinal: Negative for abdominal pain. Genitourinary: Negative for difficulty urinating. Musculoskeletal: Positive for gait problem. Skin: Positive for rash. Allergic/Immunologic: Positive for food allergies. Neurological: Positive for syncope. Psychiatric/Behavioral: Positive for confusion. Vitals:    06/03/19 0230 06/03/19 0245 06/03/19 0300 06/03/19 0313   BP: (!) 186/124 158/61 174/43    Pulse: (!) 54 (!) 46 (!) 59 (!) 45   Resp: 19 12 12   Temp: 95.5 °F (35.3 °C) 95.8 °F (35.4 °C)  96.1 °F (35.6 °C)   SpO2: 95% 98%  94%   Weight:       Height:                Physical Exam   Constitutional: She is oriented to person, place, and time. She appears well-developed and well-nourished. She appears toxic. She has a sickly appearance. She appears ill. She appears distressed. HENT:   Head: Atraumatic. Right Ear: External ear normal.   Left Ear: External ear normal.   Nose: Nose normal.   Mouth/Throat: Uvula is midline, oropharynx is clear and moist and mucous membranes are normal. No oropharyngeal exudate. There is some periorbital edema with no erythema. Eyes: Conjunctivae are normal. No scleral icterus. Neck: Neck supple. Cardiovascular: Normal rate, regular rhythm and intact distal pulses. Murmur heard. Pulses:       Radial pulses are 1+ on the right side, and 1+ on the left side. Femoral pulses are 2+ on the right side, and 2+ on the left side. Dorsalis pedis pulses are 1+ on the right side, and 0 on the left side. Posterior tibial pulses are 1+ on the right side, and 0 on the left side. Pulmonary/Chest: Tachypnea noted. She is in respiratory distress. She has rhonchi. Abdominal: Soft. There is no tenderness. Musculoskeletal: She exhibits edema. Legs:  There is bilateral lower extremity edema with some skin breakdown in the lower legs. There is slight skin ulcerations. There is no drainage or spreading erythema. All the patient's extremities are cool to the touch but she is able to move everything. Neurological: She is alert and oriented to person, place, and time. No cranial nerve deficit (3-12). Gait normal.   Skin: Skin is warm and dry. She is not diaphoretic. Psychiatric: Her behavior is normal.   Nursing note and vitals reviewed.        MDM       Procedures    Vitals:  Patient Vitals for the past 12 hrs:   Temp Pulse Resp BP SpO2   06/03/19 0313 96.1 °F (35.6 °C) (!) 45 12  94 %   06/03/19 0300  (!) 59  174/43    06/03/19 0245 95.8 °F (35.4 °C) (!) 46 12 158/61 98 %   06/03/19 0230 95.5 °F (35.3 °C) (!) 54 19 (!) 186/124 95 %   06/03/19 0215 95.3 °F (35.2 °C) (!) 55 14 (!) 215/107 96 %   06/03/19 0200 95.1 °F (35.1 °C) (!) 51 16 (!) 171/108 99 %   06/03/19 0149 (!) 94.8 °F (34.9 °C) (!) 48 13  98 %   06/03/19 0133 (!) 93.2 °F (34 °C)       06/03/19 0132     100 %   06/03/19 0130 (!) 93.4 °F (34.1 °C) 78 18 (!) 177/110 90 %   06/03/19 0115  77 19 (!) 172/114 99 %   06/03/19 0047  75 17  100 %   06/03/19 0041  62 20 (!) 172/108 97 %         Medications ordered:   Medications   sodium chloride (NS) flush 5-10 mL (has no administration in time range)   piperacillin-tazobactam (ZOSYN) 4.5 g in 0.9% sodium chloride (MBP/ADV) 100 mL MBP (4.5 g IntraVENous New Bag 6/3/19 0203)     Followed by   piperacillin-tazobactam (ZOSYN) 4.5 g in 0.9% sodium chloride (MBP/ADV) 100 mL MBP (has no administration in time range)   levoFLOXacin (LEVAQUIN) 750 mg in D5W IVPB (750 mg IntraVENous New Bag 6/3/19 0315)   vancomycin (VANCOCIN) 2000 mg in  ml infusion (has no administration in time range)   furosemide (LASIX) 10 mg/mL injection (has no administration in time range)   piperacillin-tazobactam (ZOSYN) 4.5 gram injection (has no administration in time range)   0.9% sodium chloride (MBP/ADV) infusion (has no administration in time range)   heparin 25,000 units in D5W 250 ml infusion (18 Units/kg/hr × 113.4 kg IntraVENous New Bag 6/3/19 0304)   dextrose 10 % infusion 250 mL (0 mL IntraVENous IV Completed 6/3/19 0103)   furosemide (LASIX) injection 40 mg (40 mg IntraVENous Given 6/3/19 0203)   heparin (porcine) 1,000 unit/mL injection 9,070 Units (9,070 Units IntraVENous Given 6/3/19 0302)   nitroglycerin (NITROBID) 2 % ointment 1 Inch (1 Inch Topical Given 6/3/19 0300)         Lab findings:  Recent Results (from the past 12 hour(s))   GLUCOSE, POC    Collection Time: 06/03/19 12:39 AM   Result Value Ref Range    Glucose (POC) 57 (L) 70 - 560 mg/dL   METABOLIC PANEL, COMPREHENSIVE    Collection Time: 06/03/19 12:58 AM   Result Value Ref Range    Sodium 130 (L) 136 - 145 mmol/L    Potassium 4.8 3.5 - 5.5 mmol/L    Chloride 99 (L) 100 - 108 mmol/L    CO2 26 21 - 32 mmol/L    Anion gap 5 3.0 - 18 mmol/L    Glucose 289 (H) 74 - 99 mg/dL    BUN 27 (H) 7.0 - 18 MG/DL    Creatinine 1.40 (H) 0.6 - 1.3 MG/DL    BUN/Creatinine ratio 19 12 - 20      GFR est AA 44 (L) >60 ml/min/1.73m2    GFR est non-AA 36 (L) >60 ml/min/1.73m2    Calcium 8.5 8.5 - 10.1 MG/DL    Bilirubin, total 0.8 0.2 - 1.0 MG/DL    ALT (SGPT) 19 13 - 56 U/L    AST (SGOT) 34 15 - 37 U/L    Alk. phosphatase 83 45 - 117 U/L    Protein, total 7.3 6.4 - 8.2 g/dL    Albumin 3.1 (L) 3.4 - 5.0 g/dL    Globulin 4.2 (H) 2.0 - 4.0 g/dL    A-G Ratio 0.7 (L) 0.8 - 1.7     CBC WITH AUTOMATED DIFF    Collection Time: 06/03/19 12:58 AM   Result Value Ref Range    WBC 15.2 (H) 4.6 - 13.2 K/uL    RBC 4.24 4.20 - 5.30 M/uL    HGB 11.9 (L) 12.0 - 16.0 g/dL    HCT 38.4 35.0 - 45.0 %    MCV 90.6 74.0 - 97.0 FL    MCH 28.1 24.0 - 34.0 PG    MCHC 31.0 31.0 - 37.0 g/dL    RDW 19.3 (H) 11.6 - 14.5 %    PLATELET 353 537 - 545 K/uL    MPV 9.6 9.2 - 11.8 FL    NEUTROPHILS 84 (H) 40 - 73 %    LYMPHOCYTES 8 (L) 21 - 52 %    MONOCYTES 8 3 - 10 %    EOSINOPHILS 0 0 - 5 %    BASOPHILS 0 0 - 2 %    ABS. NEUTROPHILS 12.8 (H) 1.8 - 8.0 K/UL    ABS. LYMPHOCYTES 1.2 0.9 - 3.6 K/UL    ABS. MONOCYTES 1.2 0.05 - 1.2 K/UL    ABS. EOSINOPHILS 0.0 0.0 - 0.4 K/UL    ABS.  BASOPHILS 0.0 0.0 - 0.1 K/UL    DF AUTOMATED     TSH 3RD GENERATION    Collection Time: 06/03/19 12:58 AM   Result Value Ref Range    TSH 4.59 (H) 0.36 - 3.74 uIU/mL   T4, FREE    Collection Time: 06/03/19 12:58 AM   Result Value Ref Range    T4, Free 1.5 0.7 - 1.5 NG/DL   T3, FREE    Collection Time: 06/03/19 12:58 AM   Result Value Ref Range    Triiodothyronine (T3), free 1.0 (L) 2.18 - 3.98 PG/ML   NT-PRO BNP    Collection Time: 06/03/19 12:58 AM   Result Value Ref Range    NT pro-BNP 12,804 (H) 0 - 1,800 PG/ML   PTT    Collection Time: 06/03/19 12:58 AM   Result Value Ref Range    aPTT 46.1 (H) 23.0 - 36.4 SEC   CARDIAC PANEL,(CK, CKMB & TROPONIN)    Collection Time: 06/03/19 12:58 AM   Result Value Ref Range     (H) 26 - 192 U/L    CK - MB 7.8 (H) <3.6 ng/ml    CK-MB Index 2.8 0.0 - 4.0 %    Troponin-I, QT 0.03 0.0 - 0.045 NG/ML   POC LACTIC ACID    Collection Time: 06/03/19 12:59 AM   Result Value Ref Range    Lactic Acid (POC) 1.59 0.40 - 2.00 mmol/L   POC CHEM8    Collection Time: 06/03/19  1:00 AM   Result Value Ref Range    CO2, POC 24 19 - 24 MMOL/L    Glucose,  (H) 74 - 106 MG/DL    BUN, POC 28 (H) 7 - 18 MG/DL    Creatinine, POC 1.3 0.6 - 1.3 MG/DL    GFRAA, POC 48 (L) >60 ml/min/1.73m2    GFRNA, POC 39 (L) >60 ml/min/1.73m2    Sodium,  (L) 136 - 145 MMOL/L    Potassium, POC 4.7 3.5 - 5.5 MMOL/L    Calcium, ionized (POC) 1.31 1.12 - 1.32 mmol/L    Chloride, POC 98 (L) 100 - 108 MMOL/L    Anion gap, POC 16 10 - 20      Hematocrit, POC 43 36 - 49 %    Hemoglobin, POC 14.6 12 - 16 G/DL   POC VENOUS BLOOD GAS    Collection Time: 06/03/19  1:18 AM   Result Value Ref Range    Device: NASAL CANNULA      Flow rate (POC) 4.0 L/M    pH, venous (POC) 7.132 (LL) 7.32 - 7.42      pCO2, venous (POC) 70.6 (H) 41 - 51 MMHG    pO2, venous (POC) 18 (L) 25 - 40 mmHg    HCO3, venous (POC) 23.6 23.0 - 28.0 MMOL/L    sO2, venous (POC) 17 (L) 65 - 88 %    Base deficit, venous (POC) 6 mmol/L    Allens test (POC) N/A      Total resp. rate 20      Site OTHER      Specimen type (POC) VENOUS BLOOD      Performed by Hira Boo    EKG, 12 LEAD, INITIAL    Collection Time: 06/03/19  1:57 AM   Result Value Ref Range    Ventricular Rate 59 BPM    Atrial Rate 63 BPM    QRS Duration 116 ms    Q-T Interval 444 ms    QTC Calculation (Bezet) 439 ms    Calculated R Axis -12 degrees    Calculated T Axis -36 degrees    Diagnosis       Atrial fibrillation with slow ventricular response with a competing   junctional pacemaker  Incomplete right bundle branch block  Septal infarct , age undetermined  ST & T wave abnormality, consider inferior ischemia or digitalis effect  ST & T wave abnormality, consider anterior ischemia or digitalis effect  Abnormal ECG  When compared with ECG of 16-OCT-2012 16:02,  Atrial fibrillation has replaced Sinus rhythm  Incomplete right bundle branch block has replaced Right bundle branch block  Septal infarct is now present     GLUCOSE, POC    Collection Time: 06/03/19  2:46 AM   Result Value Ref Range    Glucose (POC) 84 70 - 110 mg/dL       EKG interpretation by ED Physician:  Atrial fibrillation with slow ventricular response. There is ST and T wave abnormalities. There is incomplete right bundle branch block. Rate 59     atrial fibrillation has replaced previous sinus rhythm    Cardiac monitor: Bradycardic rate with irregular rhythm occasional ectopy  Pulse ox: 98% on BiPAP    X-Ray, CT or other radiology findings or impressions:  XR CHEST PORT    (Results Pending)   XR FEMUR RT 2 VS    (Results Pending)   XR PELV 1 OR 2 V    (Results Pending)   Chest x-ray: Cardiomegaly with slight interstitial edema. No acute fx noted on my interp  Progress notes, Consult notes or additional Procedure notes:   Patient with significantly poor perfusion in her lower legs.   There is no palpable pulse in her left foot. The foot and lower leg is cold. We will go ahead and start heparin. There is also concern for sepsis as well. Patient is already in a fluid overload status thus was not given additional fluids. Patient was started on broad spectrum antibiotics pending cultures. Also noted to be CO2 retaining with acute CHF this patient was placed on BiPAP, given Lasix and given Nitropaste. D/w Dr Sharon Brambila on call for hospitalist who will admit  D/w Margie on call for vascular who will consult; I discussed with him I do not feel this an acute occlusion according to son foot has same appearance for last few days    ED Critical Care Note    System at risk for life threatening failure: Neuro, cardiac, pulmonary, renal, vascular  Associated problems: CHF, hypercapnia, vascular occlusion, infection    Critical Care services provided: Bedside management of acute CHF, hypercapnia, arterial occlusion of the lower leg, possible infection, documentation, consultation, bedside reassessment  Excluded procedures (time not included in critical care): EKG interpretation    Total Critical Care Time (in minutes) 94          Reevaluation of patient:   stable    Disposition:  Diagnosis:   1. Acute metabolic encephalopathy due to hypoglycemia    2. Hypothermia, initial encounter    3. Acute congestive heart failure, unspecified heart failure type ([address]    4. Acute respiratory failure with hypercapnia (HCC)    5. Arterial occlusion, lower extremity (HCC)        Disposition: admit      Follow-up Information    None           Patient's Medications   Start Taking    No medications on file   Continue Taking    AMMONIUM LACTATE (AMLACTIN) 12 % TOPICAL CREAM    Apply  to affected area two (2) times a day. rub in to affected area well    MARYLOU ASPIRIN PO    Take  by mouth daily.  Indications: 1-2 tabs daily    COLESEVELAM (WELCHOL) 625 MG TABLET    TAKE THREE TABLETS BY MOUTH TWICE A DAY WITH MEALS INDICATIONS HETEROZYGOUS FAMILIAL HYPERCHOLESTEROLEMIA    ESTRACE 0.01 % (0.1 MG/GRAM) VAGINAL CREAM    INSERT VAGINALLY 2 GRAMS DAILY    ETODOLAC (LODINE) 400 MG TABLET    TAKE ONE TABLET BY MOUTH DAILY (GENERIC LODINE)    FUROSEMIDE (LASIX) 20 MG TABLET    Take one tablet by mouth daily as needed for swelling  Indications: visible water retention    GLIPIZIDE SR (GLUCOTROL XL) 5 MG CR TABLET    TAKE TWO TABLETS BY MOUTH EVERY MORNING AND TAKE ONE TABLET BY MOUTH EVERY EVENING WITH MEALS    IBUPROFEN-DIPHENHYDRAMINE (ADVIL PM) 200-38 MG TAB    Take 2 Tabs by mouth nightly. LEVOTHYROXINE (SYNTHROID) 112 MCG TABLET    Take 1 Tab by mouth Daily (before breakfast). METOPROLOL SUCCINATE (TOPROL-XL) 50 MG XL TABLET    Take 1 Tab by mouth daily. NAPROXEN-DIPHENHYDRAMINE 220-25 MG TAB    Take  by mouth. OMEPRAZOLE (PRILOSEC) 20 MG CAPSULE    Take 1 Cap by mouth daily. ORACEA 40 MG CAPSULE    daily.    These Medications have changed    No medications on file   Stop Taking    No medications on file

## 2019-06-03 NOTE — PROGRESS NOTES
Problem: Falls - Risk of  Goal: *Absence of Falls  Description  Document Tania Braswell Fall Risk and appropriate interventions in the flowsheet. Outcome: Progressing Towards Goal     Problem: Pressure Injury - Risk of  Goal: *Prevention of pressure injury  Description  Document Tate Scale and appropriate interventions in the flowsheet. Outcome: Progressing Towards Goal     Problem: Hypertension  Goal: *Blood pressure within specified parameters  Outcome: Progressing Towards Goal  Goal: *Fluid volume balance  Outcome: Progressing Towards Goal  Goal: *Labs within defined limits  Outcome: Progressing Towards Goal     Problem: Diabetes Self-Management  Goal: *Disease process and treatment process  Description  Define diabetes and identify own type of diabetes; list 3 options for treating diabetes. Outcome: Progressing Towards Goal  Goal: *Incorporating nutritional management into lifestyle  Description  Describe effect of type, amount and timing of food on blood glucose; list 3 methods for planning meals. Outcome: Progressing Towards Goal  Goal: *Incorporating physical activity into lifestyle  Description  State effect of exercise on blood glucose levels. Outcome: Progressing Towards Goal  Goal: *Developing strategies to promote health/change behavior  Description  Define the ABC's of diabetes; identify appropriate screenings, schedule and personal plan for screenings. Outcome: Progressing Towards Goal  Goal: *Using medications safely  Description  State effect of diabetes medications on diabetes; name diabetes medication taking, action and side effects. Outcome: Progressing Towards Goal  Goal: *Monitoring blood glucose, interpreting and using results  Description  Identify recommended blood glucose targets  and personal targets.   Outcome: Progressing Towards Goal  Goal: *Prevention, detection, treatment of acute complications  Description  List symptoms of hyper- and hypoglycemia; describe how to treat low blood sugar and actions for lowering  high blood glucose level. Outcome: Progressing Towards Goal  Goal: *Prevention, detection and treatment of chronic complications  Description  Define the natural course of diabetes and describe the relationship of blood glucose levels to long term complications of diabetes.   Outcome: Progressing Towards Goal  Goal: *Developing strategies to address psychosocial issues  Description  Describe feelings about living with diabetes; identify support needed and support network  Outcome: Progressing Towards Goal  Goal: *Insulin pump training  Outcome: Progressing Towards Goal  Goal: *Sick day guidelines  Outcome: Progressing Towards Goal  Goal: *Patient Specific Goal (EDIT GOAL, INSERT TEXT)  Outcome: Progressing Towards Goal     Problem: Pain  Goal: *Control of Pain  Outcome: Progressing Towards Goal  Goal: *PALLIATIVE CARE:  Alleviation of Pain  Outcome: Progressing Towards Goal     Problem: Tissue Perfusion - Cardiopulmonary, Altered  Goal: *Optimize tissue perfusion  Outcome: Progressing Towards Goal  Goal: *Absence of hypoxia  Outcome: Progressing Towards Goal     Problem: Fluid Volume - Risk of, Imbalanced  Goal: *Balanced intake and output  Outcome: Progressing Towards Goal     Problem: Patient Education: Go to Patient Education Activity  Goal: Patient/Family Education  Outcome: Progressing Towards Goal     Problem: Heart Failure: Day 1  Goal: Off Pathway (Use only if patient is Off Pathway)  Outcome: Progressing Towards Goal  Goal: Activity/Safety  Outcome: Progressing Towards Goal  Goal: Consults, if ordered  Outcome: Progressing Towards Goal  Goal: Diagnostic Test/Procedures  Outcome: Progressing Towards Goal  Goal: Nutrition/Diet  Outcome: Progressing Towards Goal  Goal: Discharge Planning  Outcome: Progressing Towards Goal  Goal: Medications  Outcome: Progressing Towards Goal  Goal: Respiratory  Outcome: Progressing Towards Goal  Goal: Treatments/Interventions/Procedures  Outcome: Progressing Towards Goal  Goal: Psychosocial  Outcome: Progressing Towards Goal  Goal: *Oxygen saturation within defined limits  Outcome: Progressing Towards Goal  Goal: *Hemodynamically stable  Outcome: Progressing Towards Goal  Goal: *Optimal pain control at patient's stated goal  Outcome: Progressing Towards Goal  Goal: *Anxiety reduced or absent  Outcome: Progressing Towards Goal

## 2019-06-03 NOTE — PROGRESS NOTES
Attempted multiple time to visit with this patient but each time patient was found to be unable to communicate as she continued to rest peacefully on the C-pap machine. No family were seen at either of the attempted visits. Deangelo Eugene offered prayer and left Spiritual Care brochure. Chaplains will continue to follow and will provide pastoral care on an as needed/requested basis.     [facility]   Spiritual Care   [phone]

## 2019-06-03 NOTE — CONSULTS
[facility]  [address] [facility], [facility]  Dr. Isabel Núñez, Dr. Celeste Escobar [place]  [phone] FAX# [phone]    Consult    Patient: Jennifer Cortez MRN: [id number]  SSN: [id number]    YOB: 1940  Age: [de-identified] y.o. Sex: female      Subjective:      Jennifer Cortez is a [de-identified] y.o. female h/o DM who is being seen for BLE PVD. Admitted 6/3/19 for Acute metabolic encephalopathy, hypoglycemia, hypothermia, Acute CHF, Arterial occlusion, and Acute resp failure. Patient poor historian due to AMS and BiPAP. Per ER notes, patient found down/unresponsive by family and was found to have severe hypoglycemia. Per notes, patient with multiple falls over last 2-3 weeks with worsening R thigh and BLE feet discoloration onset 2 days ago. Currently on heparin drip. Currently on Levaquin, Zosyn, and Vancomycin. Patient seen without family at bedside.      Past Medical History:   Diagnosis Date    AC joint arthropathy     right    Acne rosacea     Bursitis of shoulder     rotator cuff    Burst blood vessel in eye, left 02/2018    ruptured blood vessel left     Diabetes (HCC)     Diabetic retinopathy (HCC)     severe, non-proliferative    Discoid lupus     Duodenal ulcer     Facet joint disease     cervical spine, injections    Fibromyalgia     Hiatal hernia     Hypercholesterolemia     Hypertension     hypothyroid     Impingement syndrome of shoulder     left    Lumbar scoliosis     Nonexudative age-related macular degeneration     severe    Open-angle glaucoma     severe    Rectocele      Past Surgical History:   Procedure Laterality Date    COLONOSCOPY  2007    polyps, Dr. Jose Villafuerte    [address] [facility]  unk    HX Phineas Ply HX CHOLECYSTECTOMY  2007    HX KNEE ARTHROSCOPY      HX SHOULDER ARTHROSCOPY      right      Family History   Problem Relation Age of Onset    No Known Problems Mother  No Known Problems Father      Social History     Tobacco Use    Smoking status: Never Smoker    Smokeless tobacco: Never Used   Substance Use Topics    Alcohol use: Yes     Comment: Rarely      Current Facility-Administered Medications   Medication Dose Route Frequency Provider Last Rate Last Dose    sodium chloride (NS) flush 5-10 mL  5-10 mL IntraVENous PRN Adan Arrington MD        levoFLOXacin (LEVAQUIN) 750 mg in D5W IVPB  750 mg IntraVENous Q24H Adan Arrington MD   Stopped at 06/03/19 0445    furosemide (LASIX) 10 mg/mL injection     Adan Arrington MD   Stopped at 06/03/19 0211    piperacillin-tazobactam (ZOSYN) 4.5 gram injection     Adan Arrington MD   Stopped at 06/03/19 0211    0.9% sodium chloride (MBP/ADV) infusion     Adan Arrington MD   Stopped at 06/03/19 0211    heparin 25,000 units in D5W 250 ml infusion  18-36 Units/kg/hr IntraVENous TITRATE Adan Arrington MD 20.4 mL/hr at 06/03/19 0747 18 Units/kg/hr at 06/03/19 0747    levothyroxine (SYNTHROID) tablet 112 mcg  112 mcg Oral ACB Adan Arrington MD        zolpidem (AMBIEN) tablet 5 mg  5 mg Oral QHS PRN Adan Arrington MD        dextrose 5% - 0.45% NaCl with KCl 20 mEq/L infusion  100 mL/hr IntraVENous CONTINUOUS Adan Arrington MD        piperacillin-tazobactam (ZOSYN) 3.375 g in 0.9% sodium chloride (MBP/ADV) 100 mL MBP  3.375 g IntraVENous Q8H Cintia Prather MD        [START ON 6/4/2019] vancomycin (VANCOCIN) 1500 mg in  ml infusion  1,500 mg IntraVENous Q24H Cintia Prather MD        [START ON 6/6/2019] VANCOMYCIN INFORMATION NOTE   Other ONCE Cintia Prather MD        VANCOMYCIN INFORMATION NOTE 1 Each  1 Each Other Rx Dosing/Monitoring Cintia Prather MD            Allergies   Allergen Reactions    Latex Rash    Other Plant, Animal, Environmental Other (comments)     Surgical Steel, Related to ankle fracture and it caused bursitis and was removed.  ELASTIC    Acetaminophen Other (comments)     Abdominal pain    Adhesive Hives     Pt also states she has burning and stinging    Morphine Other (comments)     Agitated, nasty    Statins-Hmg-Coa Reductase Inhibitors Other (comments)     Leg cramps    Sulfa (Sulfonamide Antibiotics) Rash    Tramadol Nausea Only     Review of Systems:  Review of systems not obtained due to patient factors. Objective:     Vitals:    06/03/19 0455 06/03/19 0550 06/03/19 0608 06/03/19 0805   BP: (!) 204/62  181/78    Pulse: 79  79    Resp: 16  17    Temp: 97.9 °F (36.6 °C)  98.4 °F (36.9 °C)    SpO2:  94% 93% 97%   Weight:       Height:          Physical Exam:  GENERAL: Opens eyes to touch, cooperative, moderate distress/ on BiPAP, appears stated age  EYE: OD ptosis. PERRL, non-icteric  NECK: supple, symmetric. No JVD  LUNG: On BiPAP. clear to auscultation bilaterally  HEART: regular rate and rhythm  ABDOMEN: soft, slight distension LLQ. Bowel sounds normal.   EXTREMITIES: BLE toes cool to touch with purple discoloration. BLE ant tib cellulitis. RLE ant tib small multiple superficial ulcerations with slough at base. Serosanguinous drainage noted. RLE lateral/mid thigh erythematous demarcated area, warm to touch. Palpable 2+ BLE femoral pulses. Nonpalpable BLE DP/PT pulses. NTTP BLE. Palpable 2+ BUE radial pulses. NEUROLOGIC: Deferred    Labs:        CBC WITH AUTOMATED DIFF    Collection Time: 06/03/19 12:58 AM   Result Value Ref Range    WBC 15.2 (H) 4.6 - 13.2 K/uL    RBC 4.24 4.20 - 5.30 M/uL    HGB 11.9 (L) 12.0 - 16.0 g/dL    HCT 38.4 35.0 - 45.0 %    MCV 90.6 74.0 - 97.0 FL    MCH 28.1 24.0 - 34.0 PG    MCHC 31.0 31.0 - 37.0 g/dL    RDW 19.3 (H) 11.6 - 14.5 %    PLATELET 427 017 - 424 K/uL    MPV 9.6 9.2 - 11.8 FL    NEUTROPHILS 84 (H) 40 - 73 %    LYMPHOCYTES 8 (L) 21 - 52 %    MONOCYTES 8 3 - 10 %    EOSINOPHILS 0 0 - 5 %    BASOPHILS 0 0 - 2 %    ABS. NEUTROPHILS 12.8 (H) 1.8 - 8.0 K/UL    ABS.  LYMPHOCYTES 1.2 0.9 - 3.6 K/UL ABS. MONOCYTES 1.2 0.05 - 1.2 K/UL    ABS. EOSINOPHILS 0.0 0.0 - 0.4 K/UL    ABS. BASOPHILS 0.0 0.0 - 0.1 K/UL    DF AUTOMATED       BMP:   Lab Results   Component Value Date/Time     (L) 06/03/2019 12:58 AM    K 4.8 06/03/2019 12:58 AM    CL 99 (L) 06/03/2019 12:58 AM    CO2 26 06/03/2019 12:58 AM    AGAP 5 06/03/2019 12:58 AM     (H) 06/03/2019 12:58 AM    BUN 27 (H) 06/03/2019 12:58 AM    CREA 1.40 (H) 06/03/2019 12:58 AM    GFRAA 44 (L) 06/03/2019 12:58 AM    GFRNA 36 (L) 06/03/2019 12:58 AM      CULTURE BLOOD 6/3/19: Pending    Assessment:     Hospital Problems  Date Reviewed: 5/9/2019          Codes Class Noted POA    Diabetes ([facility] ICD-10-CM: E11.9  ICD-9-CM: 250.00  6/3/2019 Unknown        Hypoglycemia ICD-10-CM: E16.2  ICD-9-CM: 251.2  6/3/2019 Unknown        PAD (peripheral artery disease) ([facility] ICD-10-CM: I73.9  ICD-9-CM: 443.9  6/3/2019 Unknown        * (Principal) Sepsis ([facility] ICD-10-CM: A41.9  ICD-9-CM: 038.9, 995.91  6/3/2019 Yes        Acute respiratory failure ([facility] ICD-10-CM: J96.00  ICD-9-CM: 518.81  6/3/2019 Unknown        Cellulitis ICD-10-CM: L03.90  ICD-9-CM: 682.9  6/3/2019 Yes        Severe obesity ([facility] ICD-10-CM: E66.01  ICD-9-CM: 278.01  5/9/2019 Yes        Type 2 diabetes with nephropathy (HCC) (Chronic) ICD-10-CM: E11.21  ICD-9-CM: 250.40, 583.81  3/23/2018 Yes        Essential hypertension (Chronic) ICD-10-CM: I10  ICD-9-CM: 401.9  9/3/2015 Yes        hypothyroid (Chronic) ICD-10-CM: E07.9  ICD-9-CM: 246. 9  Unknown Yes            BLE PVD with BLE cellulitis  Acute Resp failure  Sepsis  AMS  Hypoglycemia - resolved  HTN  DM  Plan:     Carotid Duplex  BLE Arterial Duplex  Continue heparin drip for now  Continue Abx  Neurovascular checks  Local wound care  Will FU once imaging obtained     Signed By: NESHA Rose     Alta 3, 2019

## 2019-06-03 NOTE — PROGRESS NOTES
Patient arrived from ER on 6 liters nasal cannula with ET tech/RN/RT and placed back on Bipap machine. Patient tolerating settings well at this time. Will continue to monitor pt status.

## 2019-06-03 NOTE — CDMP QUERY
Patient admitted with Acute Metabolic Encephalopathy sec to hypoglycemia , Noted documentation of Sepsis in H/P  Please document in progress notes clinical indicators (such as the ones below) to support this diagnosis or if Sepsis may have been ruled out after study. 1. At least 2 SIRS Criteria (Systemic Inflammatory Response Syndrome) (CMS)  -Temp > 100.9 or < 96.8  -HR > 90  -RR > 20   -WBC > 12,000 or < 4,000 or > 10% bands    2. Documented suspected or confirmed source of infection. (CMS)     3. Documented Organ Dysfunction by any ONE of the following. (CMS)         the CMS guidelines  -AMS (from baseline if known)  -SBP<90 or MAP<65  -Documentation of respiratory failure and use of either invasive mechanical ventilation (intubation) or non-invasive mechanical ventilation (CPAP/BIPAP)    -Creat. > 2.0 (with no history of Chronic Kidney Disease)  -Bilirubin > 2 mg / dl (with no history of liver disease)  -PLT < 100,000 (with no history of thrombocytopenia)  -INR > 1.5 or aPTT > 60 sec (for patients not on Warfarin therapy)  -Lactic Acid > 2 mmol/ L    The medical record reflects the following:     Risk Factors: Age, Dm, Pvd,      Clinical Indicators:  T 93,4, p 62, rr 20, blood cultures + for gram Positive Cocci,   urine  + 4 bacteria, Moderate Esterase, lactic acid  1.59, Cellulitis      Treatment: Ivf, Abx,     Merry Fish RN/CDI  [phone]

## 2019-06-03 NOTE — PROGRESS NOTES
1495 Assumed car of pt after bedside report from ER nurse. Connected to monitor, NBP cuff, and pulse ox. Monitor denotes NSR with BBB and inverted T wave. Pt on BIPAP at prescribed settings. AAO x 3. BURGESS x 4 purposefully but weak due to severe edema in BLE. Abd distended, soft, obese. Jackson ctheter in place and draining solis urine. BLE skin issues as documented on Skin MAN. Lt foot with no palpable or doppler pulse. Rt foot with faint doppler pulse. Pt on Heparin drip as ordered. IVF not infusing at present regarding BNP [id number] result.

## 2019-06-03 NOTE — PROGRESS NOTES
Problem: Falls - Risk of  Goal: *Absence of Falls  Description  Document Rolando Garcia Fall Risk and appropriate interventions in the flowsheet. Outcome: Progressing Towards Goal     Problem: Pressure Injury - Risk of  Goal: *Prevention of pressure injury  Description  Document Tate Scale and appropriate interventions in the flowsheet. Outcome: Progressing Towards Goal     Problem: Hypertension  Goal: *Blood pressure within specified parameters  Outcome: Progressing Towards Goal  Goal: *Fluid volume balance  Outcome: Progressing Towards Goal  Goal: *Labs within defined limits  Outcome: Progressing Towards Goal     Problem: Diabetes Self-Management  Goal: *Disease process and treatment process  Description  Define diabetes and identify own type of diabetes; list 3 options for treating diabetes. Outcome: Progressing Towards Goal  Goal: *Incorporating nutritional management into lifestyle  Description  Describe effect of type, amount and timing of food on blood glucose; list 3 methods for planning meals. Outcome: Progressing Towards Goal  Goal: *Incorporating physical activity into lifestyle  Description  State effect of exercise on blood glucose levels. Outcome: Progressing Towards Goal  Goal: *Developing strategies to promote health/change behavior  Description  Define the ABC's of diabetes; identify appropriate screenings, schedule and personal plan for screenings. Outcome: Progressing Towards Goal  Goal: *Using medications safely  Description  State effect of diabetes medications on diabetes; name diabetes medication taking, action and side effects. Outcome: Progressing Towards Goal  Goal: *Monitoring blood glucose, interpreting and using results  Description  Identify recommended blood glucose targets  and personal targets.   Outcome: Progressing Towards Goal  Goal: *Prevention, detection, treatment of acute complications  Description  List symptoms of hyper- and hypoglycemia; describe how to treat low blood sugar and actions for lowering  high blood glucose level. Outcome: Progressing Towards Goal  Goal: *Prevention, detection and treatment of chronic complications  Description  Define the natural course of diabetes and describe the relationship of blood glucose levels to long term complications of diabetes.   Outcome: Progressing Towards Goal  Goal: *Developing strategies to address psychosocial issues  Description  Describe feelings about living with diabetes; identify support needed and support network  Outcome: Progressing Towards Goal  Goal: *Insulin pump training  Outcome: Progressing Towards Goal  Goal: *Sick day guidelines  Outcome: Progressing Towards Goal  Goal: *Patient Specific Goal (EDIT GOAL, INSERT TEXT)  Outcome: Progressing Towards Goal     Problem: Pain  Goal: *Control of Pain  Outcome: Progressing Towards Goal  Goal: *PALLIATIVE CARE:  Alleviation of Pain  Outcome: Progressing Towards Goal     Problem: Tissue Perfusion - Cardiopulmonary, Altered  Goal: *Optimize tissue perfusion  Outcome: Progressing Towards Goal  Goal: *Absence of hypoxia  Outcome: Progressing Towards Goal     Problem: Fluid Volume - Risk of, Imbalanced  Goal: *Balanced intake and output  Outcome: Progressing Towards Goal     Problem: Patient Education: Go to Patient Education Activity  Goal: Patient/Family Education  Outcome: Progressing Towards Goal     Problem: Heart Failure: Day 1  Goal: Off Pathway (Use only if patient is Off Pathway)  Outcome: Progressing Towards Goal  Goal: Activity/Safety  Outcome: Progressing Towards Goal  Goal: Consults, if ordered  Outcome: Progressing Towards Goal  Goal: Diagnostic Test/Procedures  Outcome: Progressing Towards Goal  Goal: Nutrition/Diet  Outcome: Progressing Towards Goal  Goal: Discharge Planning  Outcome: Progressing Towards Goal  Goal: Medications  Outcome: Progressing Towards Goal  Goal: Respiratory  Outcome: Progressing Towards Goal  Goal: Treatments/Interventions/Procedures  Outcome: Progressing Towards Goal  Goal: Psychosocial  Outcome: Progressing Towards Goal  Goal: *Oxygen saturation within defined limits  Outcome: Progressing Towards Goal  Goal: *Hemodynamically stable  Outcome: Progressing Towards Goal  Goal: *Optimal pain control at patient's stated goal  Outcome: Progressing Towards Goal  Goal: *Anxiety reduced or absent  Outcome: Progressing Towards Goal

## 2019-06-03 NOTE — PROGRESS NOTES
Called for ABG, unable to obtain sample and VBG done results showed C02 elevated. Bipap ordered and patient placed on 16/8 and 50%.

## 2019-06-04 PROBLEM — I50.43 ACUTE ON CHRONIC COMBINED SYSTOLIC AND DIASTOLIC HEART FAILURE (HCC): Status: ACTIVE | Noted: 2019-01-01

## 2019-06-04 NOTE — PROGRESS NOTES
[facility]  [address], [address] [facility]   Dr. Cirilo Concepcion, Dr. Fabio Leija, Dr. Taco Pham  [phone] FAX# [phone]    Progress Note    Patient: Radha Rosenthal MRN: [id number]  SSN: [id number]    YOB: 1940  Age: [de-identified] y.o. Sex: female      Admit Date: 6/3/2019    LOS: 1 day     Subjective:     BLE PVD with BLE cellulitis, RLE thigh/R breast erythema/ cellulitis, and Severe near occlusion DANIEL stenosis. On BiPAP. Patient poor historian. Currently on Vancomycin and Zosyn. Currently on Heparin drip. Objective:     Vitals:    06/04/19 0736 06/04/19 0745 06/04/19 0800 06/04/19 0815   BP:   160/53    Pulse:  72 83 72   Resp:  13 15 12   Temp:  98.5 °F (36.9 °C) 98.7 °F (37.1 °C) 98.8 °F (37.1 °C)   SpO2: 99% 100% 100% 100%   Weight:       Height:            Intake and Output:  Current Shift: 06/04 0701 - 06/04 1900  In: -   Out: 100 [Urine:100]  Last three shifts: 06/02 1901 - 06/04 0700  In: 3670.6 [I.V.:3670.6]  Out: 1355 [Urine:1355]    Physical Exam:   GENERAL: Opens eyes to touch, barley responds with voice, cooperative, moderate distress/ on BiPAP  HEAD: Facial edema  EYE: OD ptosis. PERRL, non-icteric  NECK: supple, symmetric. No JVD  CHEST: R breast erythema  LUNG: On BiPAP. clear to auscultation bilaterally  HEART: regular rate and rhythm  ABDOMEN: soft, slight distension LLQ. Bowel sounds normal.   EXTREMITIES: BLE toes cool to touch with hyperemia. BLE ant tib cellulitis. RLE ant tib small multiple superficial ulcerations with slough at base. Serosanguinous drainage noted. RLE lateral/mid thigh erythematous demarcated maculopapular rash, warm to touch. Palpable 2+ BLE femoral pulses. Nonpalpable BLE DP/PT pulses. NTTP BLE. Palpable 2+ BUE radial pulses.    NEUROLOGIC: Deferred    Lab/Data Review:  BMP:   Lab Results   Component Value Date/Time     (L) 06/04/2019 05:00 AM    K 4.0 06/04/2019 05:00 AM     06/04/2019 05:00 AM    CO2 24 06/04/2019 05:00 AM    AGAP 7 06/04/2019 05:00 AM     (H) 06/04/2019 05:00 AM    BUN 28 (H) 06/04/2019 05:00 AM    CREA 1.45 (H) 06/04/2019 05:00 AM    GFRAA 42 (L) 06/04/2019 05:00 AM    GFRNA 35 (L) 06/04/2019 05:00 AM     CBC:   Lab Results   Component Value Date/Time    WBC 11.2 06/04/2019 05:00 AM    HGB 9.9 (L) 06/04/2019 05:00 AM    HCT 31.1 (L) 06/04/2019 05:00 AM     06/04/2019 05:00 AM      CULTURE BLOOD 6/3/19:   Suann Toni STAIN      Preliminary   GRAM POSITIVE COCCI IN PAIRS IN CLUSTERS               GRAM STAIN      Preliminary   AEROBIC BOTTLE GRAM POSITIVE COCCI IN PAIRS IN CLUSTERS                            DUPLEX CAROTID BILATERAL 6/3/19: Limited/technically difficult study  1. Near right ICA occlusion  2. Left 50-69%   Right PSV Right EDV Left PSV Left EDV   CCA Prox 58.6 cm/s       9 cm/s       125.8 cm/s       27.2 cm/s         CCA Mid 65.1 cm/s       7.7 cm/s       87.5 cm/s       23.6 cm/s         CCA Dist 74.2 cm/s       6.4 cm/s       125.8 cm/s       23.6 cm/s         ICA Prox         186.2 cm/s       51.5 cm/s         ICA Mid 21.1 cm/s       0 cm/s       183.6 cm/s       49 cm/s         ICA Dist 30.3 cm/s       6.2 cm/s       157.7 cm/s       59.3 cm/s         ICA/CCA Ratio 0.4         1.5           .7 cm/s       0 cm/s       118.9 cm/s       0 cm/s         Vertebral 75.5 cm/s       15.5 cm/s       95.5 cm/s       17.8 cm/s         Subclavian 213 cm/s       0 cm/s       246.2 cm/s       0 cm/s           DUPLEX BLE ARTERIAL 6/3/19: Limited/technically difficult study  1. Significant bilateral peripheral arterial disease in the lower extremities. 2. Moderate stenosis in the right common femoral artery. 3. Absent right posterior tibial artery consistent with occlusion. 4. Absent left distal popliteal, posterior tibial and anterior tibial arteries consistent with occlusion.     Assessment:     Principal Problem:    Sepsis ([address] (6/3/2019)    Active Problems:    hypothyroid ()      Essential hypertension (9/3/2015)      Type 2 diabetes with nephropathy ([address] (3/23/2018)      Severe obesity ([address] (5/9/2019)      Diabetes ([address] (6/3/2019)      Hypoglycemia (6/3/2019)      PAD (peripheral artery disease) ([address] (6/3/2019)      Acute respiratory failure ([address] (6/3/2019)      Cellulitis (6/3/2019)      BLE PVD with BLE cellulitis  RLE thigh/R breast erythema/ cellulitis   Severe near occlusion DANIEL stenosis  Moderate LICA Stenosis  Syncope  Acute Resp failure  Sepsis  AMS  Hypoglycemia - resolved  HTN  DM  Obesity   Plan:     No emergent vascular intervention needed at this time - patient unstable for intervention at this time, (+) Bcx   Would need Aortogram with BLE r/o attn LLE and possible intervention - will evaluate daily to see if candidate   Continue heparin drip for now  Continue Abx  Neurovascular checks  Local wound care  Will need CTA Head/Neck to evaluate DANIEL patency      Signed By: NESHA Odonnell     June 4, 2019

## 2019-06-04 NOTE — PROGRESS NOTES
Respiratory Therapy Assessment Care Plan    Patient:  Sabrina Bermudez [de-identified] y.o. female 6/4/2019 8:49 AM    Hypoglycemia [E16.2]  PAD (peripheral artery disease) ([facility] [I73.9]  Diabetes ([facility] [E11.9]      Chest X-RAY:   Results from Hospital Encounter encounter on 06/03/19   XR CHEST PORT    Impression IMPRESSION:    New right internal jugular line projects in standard position. No conspicuous  pneumothorax. Unchanged left basilar lung opacity -- atelectasis, aspiration, infection, or a  combination, with possible left basilar effusion. _______________               XR PELV 1 OR 2 V    Impression IMPRESSION:  Rotated projection of the pelvis with degenerative changes as  described. No superimposed acute radiographic abnormality. XR FEMUR RT 2 VS    Impression IMPRESSION:    Degenerative changes involving the right femur as described without radiographic  evidence of acute abnormality.                Vital Signs:   Visit Vitals  /53   Pulse 72   Temp 98.8 °F (37.1 °C)   Resp 12   Ht 5' 5\" (1.651 m)   Wt 110.4 kg (243 lb 6.2 oz)   SpO2 100%   BMI 40.50 kg/m²           Indications for treatment: CHF exerbation      Plan of care: Bipap as needed        Goal: Improve gas exchange

## 2019-06-04 NOTE — PROGRESS NOTES
0730 Received report from off going RN at the bedside inclusive of SBAR, lines drains drips and plan. Patient with new line placed to R IJ triple lumen at [phone] [address] [facility] Report given to Savannah RN at the bedside covering SBAR, lines drains drips and plan.

## 2019-06-04 NOTE — PROGRESS NOTES
[address] [place] [address] [facility]  ICU Progress Note      Name: Lara Moss   : 1940   MRN: [id number]   Date: 2019 11:48 AM     [x]I have reviewed the flowsheet and previous days notes. Events overnight reviewed and discussed with nursing staff. Vital signs and records reviewed. Subjective:  19:    - Yesterday (6/3) patient's blood sugar dropped and was undetectable; She was given multiple D10 bolus and was started on D20 with CVL  - Overnight patient was able to gradually titrate off D20% and is maintaining sugars for now  -Continue checking BS q2hrs then switch to q4 at 1600 then q6 at midnight (Resume q1hr checks if patient shows evidence of a decline)  - Pt still hyponatremic, will Continue diuretics   -Vascular Surgery choosing to hold off on any interventions for this patient despite total arterial occlusion seen on LLE and near occlusion on R ICA. -Questionable code status due to [address] indicating circumstantial DNR/DNI status. Will conclude final decision with son who was officially made POA on Advanced Directive. [x]The patient is unable to give any meaningful history or review of systems because the patient is:  []Intubated []Sedated   []Unresponsive      [x]The patient is critically ill on      []Mechanical ventilation []Pressors   [x]BiPAP []                 ROS:A comprehensive review of systems was negative.     Medication Review:  · Pressors - None  · Sedation - None  · Antibiotics - Vanc/ Zosyn  · Pain - None  · GI/ DVT - Protonix/ Heparin gtt  · Others (other gtts)      Vital Signs:    Visit Vitals  /72   Pulse 96   Temp 98.6 °F (37 °C)   Resp 15   Ht 5' 5\" (1.651 m)   Wt 110.4 kg (243 lb 6.2 oz)   SpO2 98%   BMI 40.50 kg/m²       O2 Device: BIPAP       Temp (24hrs), Av.8 °F (37.1 °C), Min:97.8 °F (36.6 °C), Max:99.2 °F (37.3 °C)       Intake/Output:   Last shift:       07 -  1900  In: 100 [I.V.:100]  Out: 100 [Urine:100]  Last 3 shifts: 06/02 1901 - 06/04 0700  In: 3670.6 [I.V.:3670.6]  Out: 1355 [Urine:1355]    Intake/Output Summary (Last 24 hours) at 6/4/2019 1148  Last data filed at 6/4/2019 0915  Gross per 24 hour   Intake 2929.6 ml   Output 1285 ml   Net 1644.6 ml       Ventilator Settings:        Ventilator Volumes  Vt Spont (ml): 481 ml  Ve Observed (l/min): 5.9 l/min       Physical Exam:    General:  Somnolent, becomes obtunded with hypoglycemia   Head:  Normocephalic, without obvious abnormality, atraumatic. Eyes:  R eye periorbital edema with ptosis   Nose: Nares normal. Septum midline. Mucosa normal. No drainage or sinus tenderness. Throat: Yellow teeth, dry mucuous membrane   Neck: Supple, symmetrical, trachea midline, no adenopathy, no carotid bruit and no JVD. Lungs:   Symmetrical chest rise; good AE bilat; CTAB; no wheezes/rhonchi/rales noted. Heart:  RRR, S1, S2 normal, no m/r/g   Abdomen:   Soft, abd hernia with slight distention. Bowel sounds normal. No masses,  No organomegaly. Extremities: Extremities normal, atraumatic, no cyanosis or edema. Pulses: 2+ and symmetric all extremities. Skin: BLE toes cool to touch with purple discoloration. BLE ant tib cellulitis. RLE ant tib small multiple superficial ulcerations with slough at base. Serosanguinous drainage noted. RLE lateral/mid thigh erythematous demarcated area, warm to touch. Palpable 2+ BLE femoral pulses. Nonpalpable BLE DP/PT pulses.  NTTP BLE.  Palpable 2+ BUE radial pulses   Neurologic: Grossly nonfocal            DATA:     Current Facility-Administered Medications   Medication Dose Route Frequency    dextrose 5% and 0.9% NaCl infusion  100 mL/hr IntraVENous CONTINUOUS    sodium chloride (NS) flush 5-10 mL  5-10 mL IntraVENous PRN    heparin 25,000 units in D5W 250 ml infusion  18-36 Units/kg/hr IntraVENous TITRATE    levothyroxine (SYNTHROID) tablet 112 mcg  112 mcg Oral ACB    zolpidem (AMBIEN) tablet 5 mg  5 mg Oral QHS PRN    piperacillin-tazobactam (ZOSYN) 3.375 g in 0.9% sodium chloride (MBP/ADV) 100 mL MBP  3.375 g IntraVENous Q8H    vancomycin (VANCOCIN) 1500 mg in  ml infusion  1,500 mg IntraVENous Q24H    [START ON 6/6/2019] VANCOMYCIN INFORMATION NOTE   Other ONCE    VANCOMYCIN INFORMATION NOTE 1 Each  1 Each Other Rx Dosing/Monitoring    glucose chewable tablet 16 g  4 Tab Oral PRN    glucagon (GLUCAGEN) injection 1 mg  1 mg IntraMUSCular PRN    pantoprazole (PROTONIX) injection 40 mg  40 mg IntraVENous DAILY    dextrose 20% in water infusion  50 mL/hr IntraVENous CONTINUOUS    hydrocortisone Sod Succ (PF) (SOLU-CORTEF) injection 100 mg  100 mg IntraVENous TID         Labs: Results:       Chemistry Recent Labs     06/04/19  0500 06/03/19  1620 06/03/19  0058   * 71* 289*   * 133* 130*   K 4.0 4.2 4.8    103 99*   CO2 24 23 26   BUN 28* 29* 27*   CREA 1.45* 1.43* 1.40*   CA 8.1* 8.2* 8.5   AGAP 7 7 5   BUCR 19 20 19   AP  --  69 83   TP  --  6.2* 7.3   ALB  --  2.7* 3.1*   GLOB  --  3.5 4.2*   AGRAT  --  0.8 0.7*      CBC w/Diff Recent Labs     06/04/19  0500 06/03/19  0058   WBC 11.2 15.2*   RBC 3.58* 4.24   HGB 9.9* 11.9*   HCT 31.1* 38.4    162   GRANS 92* 84*   LYMPH 5* 8*   EOS 0 0      Coagulation Recent Labs     06/04/19  0915 06/04/19  0045   APTT CALLED TO AND CORRECTLY REPEATED BY: 48.6*       Liver Enzymes Recent Labs     06/03/19  1620   TP 6.2*   ALB 2.7*   AP 69   SGOT 82*      ABG Lab Results   Component Value Date/Time    FIO2I 45 06/03/2019 03:51 PM      Microbiology Recent Labs     06/03/19  0650 06/03/19  0058 06/03/19  0052   CULT NO GROWTH AFTER 21 HOURS CULTURE IN PROGRESS,FURTHER UPDATES TO FOLLOW Sent to [facility] for ID/Susceptibility if indicated NO GROWTH 1 DAY          Telemetry: []Sinus []A-flutter []Paced    []A-fib []Multiple PVCs                  Imaging:    CXR [date]:  CXR Results  (Last 48 hours)               06/03/19 0051  XR CHEST PORT Final result Impression:  IMPRESSION:       Small left pleural effusion and adjacent left basilar atelectasis/infiltrate. Narrative:  EXAM: CHEST RADIOGRAPH, SINGLE VIEW       CLINICAL INDICATION/HISTORY: Sepsis, fever       COMPARISON: None. TECHNIQUE: Portable frontal view of the chest was obtained.        _______________       FINDINGS:       SUPPORT DEVICES: None. HEART AND MEDIASTINUM: Cardiomediastinal silhouette appears within normal   limits. Normal caliber thoracic aorta. No central vascular congestion. LUNGS AND PLEURAL SPACES: Patchy opacity is seen throughout retrocardiac left   lower lobe silhouetting adjacent left hemidiaphragm. Left mid and upper lung   zones and right lung remain well aerated. No definite right pleural effusion. No pneumothorax. BONY THORAX AND SOFT TISSUES: No acute osseous abnormality. _______________                 CT HEAD/CHEST/ABD/PELVIS [date]:  [x]I have personally reviewed the patients radiographs  [x]Radiographs reviewed with radiologist           IMPRESSION:   · Acute mixed Hypoxic/Hypercarbic Respiratory Failure- Initial pH. 7.132 pCO2. 70.6 pO2.18 HCO3. 23.6  · Acute CHF Exac  · Severe BLE Peripheral Artery Disease with Arterial Occlusion- Complete occlusion of LLE, near occlusion on R ICA.   · PVD vs Cellulitis vs Erysipelas  · Severe Hypoglycemia  · Acute Metabolic Encephalopathy- likely due to above     Patient Active Problem List   Diagnosis Code    Edema R60.9    Cough R05    Hypercholesterolemia E78.00    Fibromyalgia M79.7    hypothyroid E07.9    Dermatitis L30.9    Essential hypertension I10    Type 2 diabetes mellitus with mild nonproliferative diabetic retinopathy without macular edema ([facility]) E11.3299    Type 2 diabetes with nephropathy ([facility]) E11.21    Severe obesity ([facility]) E66.01    Hypoglycemia E16.2    PAD (peripheral artery disease) ([facility]) I73.9    Sepsis ([facility]) A41.9    Acute respiratory failure ([facility]) J96.00    Cellulitis L03.90        RECOMMENDATIONS:   · Resp:   -Pt on BiPap for resp failure and chf exac  -Will utilize diuretics  -Continue Pulmonary Hygiene w/ steroids  -Daily CXR  -Daily ABG's  · I/D:    -Afebrile; aleukocytosis;    -LA obtained and was WNL   -ABX : Vanc/Zosyn  · Hem/Onc:    -Daily CBC; H/H, and plts are stable  -On heparin gtt with very low threshold requiring frequent pauses of Heparin gtt. · CVS:    -Near R ICA occlusion, Complete LLE occlusion  -Vascular following, no plans for intervention from vascular due to the chronic aspect of the occlusions per Vascular  -Monitor Hemodynamics, stable for now   -Will diurese with Bumex BID for now   · Metabolic:    -Daily BMP   -monitor e-lytes; replace PRN  · Renal:    -Trend Renal indices;   -Lozoya catheter / Diuresis (Bumex)  · Endocrine:   -Severe hypoglycemia  -C-Peptide to see if hypoglycemia is exogenous or endogenous (likely exogenous)  -On D10 NS @ 50/hr.   -Q2 hr Blood sugar checks till 1600 then q4 hrs checks till midnight then q6hrs. If BS declines, will revert to q1hr until stable. · GI:   -SUP- Protonix  - Zofran PRN for N/V   -Known hernia  · Musc/Skin:   -Vasculitis/ Cellulitis-like presentation of bilateral lower extremities  · Neuro:   -PRN pain medications  · Fluids: D10 NS @50/hr  · Code Status: Questionable Full code. Will follow up with son for official status. Best Practices/ Safety Bundles:  · Sepsis Bundle per Hospital Protocol  · CAUTI Bundle  · Electrolyte Replacement Bundle  · Glycemic control goal <180; avoid Hypoglycemia  · IHI ICU Bundles:  ·  Central Line Bundle Followed     · [facility] Vent patients:   · VAP bundle, Aim to keep peak plateau pressure 04-44KV H2O  · Aspiration Precautions - HOB >30'  · Daily sedation holiday as indicated  · SBT as tolerated/appropriate  · Titrate FiO2 for SpO2 >94%  · Aggressive Pulmonary Hygeiene  · Stress ulcer prophylaxis. Protonix   · DVT prophylaxis.  Heparin gtt  · Need for Lines, lozoya assessed. · Restraints need. · Palliative care evaluation.       The patient is: [x] acutely ill Risk of deterioration: [] moderate    [] critically ill  [x] high     [x]See my orders for details    My assessment/plan was discussed with:  [x]Nursing []PT/OT    [x]Respiratory therapy [x]Dr. Vivek Campos   [x]Family []       Critical Care Time: 60 mins      Caitlyn Chaudhari NP-BC     Pulmonary, Critical Care Medicine  [facility]

## 2019-06-04 NOTE — PROGRESS NOTES
2030,received pt on BIPAP 16/8 45% head elevated resting with eyes closed will monitor though the night.

## 2019-06-04 NOTE — PROGRESS NOTES
PCCM Update:    Intensivist NP spoke with patient's son Garner Aase) about patient's code status. Son was made official POA based on Advanced Directive. After NP described the Full Code process to Tristan Myles has decided to make his mother a DNR/DNI code status.      Hospitalist notified      Critical Care time: 20 mins        Caitlyn Chaudhari NP-BC     Pulmonary, [address] [place] [address],[address] [facility]

## 2019-06-04 NOTE — PROGRESS NOTES
-0730-Received patient and Bipap 16/8, FIO2-45% O2 sats-100% HR-77, RR-14. Pt. Is awake. Respiratory will continue to monitor. -[facility]    -1530-Pt. Remains on Bipap with above settings. O2 sats-100% HR-73, RR-14. No respiratory distress noted. -[address]

## 2019-06-04 NOTE — PROGRESS NOTES
Physical Exam                     []Hide copied text    []Hover for details      Physical Exam   Skin:           New IJ placed on the Right triple lumen.

## 2019-06-04 NOTE — CONSULTS
Pulmonary progress note    History  39-year-old woman with diabetes and peripheral arterial disease was found down by her son with altered mental status. She was hypoglycemic. Both lower extremities were erythematous with poor arterial pulses. She was started on BiPAP for respiratory hypercapnia. She had been vomiting and may have inadvertently overdosed on glipizide. The patient is minimally interactive but overall appears somnolent. She is wearing BiPAP. Exam  Afebrile. HR 65-75. -180. RR in low teens    Diffuse pitting edema  Conjugate gaze. Tolerating BiPAP. Coarse breath sounds bilaterally  Irregular rhythm  Abdomen soft and nondistended  Bilateral lower extremities are edematous. Overall minimal change in the lower extremity edema and erythema over the last 24 hours suggesting a chronic condition. Neither leg appears to be particularly tender to palpation. Both legs have significant pitting edema, however. There are no palpable pulses on the left leg. WBC 15.2 improved to 11.2, hemoglobin decreased from 11.9 to 9.9 and platelets 748. Sodium 131, BUN 28 and creatinine 1.45    Blood cultures are showing gram-positive cocci in pairs and clusters    Assessment  Altered mental status  Hypercapnia still requiring BiPAP  Gram-positive cocci bacteremia with identification pending  Bilateral lower extremity cellulitis? Bilateral lower extremity peripheral arterial disease  Lower extremity ischemia likely chronic  Diffuse edema and volume overload  Persistent hypoglycemia likely reflecting inadvertent glipizide overdose  Hypervolemia hyponatremia still warranting diuresis  Anemia of unclear etiology. Likely component of volume resuscitation  Generalized weakness.   I suspect that she will require prolonged rehabilitation or nursing home care at discharge    The patient's mental status changes appear to be driven by a combination of acute respiratory failure, gram-positive bacteremia, and hypoglycemia. Plan  Antibiotics. Tailor once bacteria identified  Supplemental glucose  Vascular surgery consultation favors simple anticoagulation  Diuresis  Increase activity    The patient is critically ill with organ failure. Given her baseline poor health and the significant nature of her current illnesses, she is at high risk for death.   Total critical care time without physician overlap or procedure time included: 50 minutes

## 2019-06-04 NOTE — MANAGEMENT PLAN
Discharge/Transition Planning    Problem: Discharge Planning  Goal: *Discharge to safe environment  Outcome: Progressing Towards Goal     Reason for Admission:  Acute Resp Failure; CHF; BLE PVD with BLE cellulitis              RRAT Score:     28             Resources/supports as identified by patient/family:   Son                Top Challenges facing patient (as identified by patient/family and CM): Finances/Medication cost?      Not concern              Transportation? Son drives pt; difficulty getting to car and in/out              Support system or lack thereof? Son                     Living arrangements? With son             Self-care/ADLs/Cognition? Not caring for self well, hiding ailments and wounds, not taking medications as prescribed          Current Advanced Directive/Advance Care Plan: On File and wants to be DNR per paperwork and PCP notes                          Plan for utilizing home health:    After SNF                      Likelihood of readmission: High Risk                 Transition of Care Plan:           0900: Pt on Bipap and could not be interviewed. Called son, Irma Villafeurte and left voicemail to call present CM. Reviewed chart. Pt with [employer] for insurance. Seeing Dr Michel Henry for PCP and last office visit note on 5/9/19. Has Advanced Directive on File stating she would like to be DNR. DME per PCP: rolling walker. Has ongoing weeping wound on rle. 1015: Son called CM back. Verified demographics as correct. Pt with [employer]. Pt lives with son in 2 story home and she has 1st floor bedroom. Son states she will rarely leave house and has not been caring for self well and will hide ailments. Frequent falls. Not taking Lasix cause it makes her urinate frequently and lower extremities swollen and weeping fluid to point he cannot get shoes on mom. DME: There is ramp in back of home but she has to travel some distance to car. Rollator; walker; cane. Transition Plan: SNF. Received permission to match to [facility] and SNF list left in room and he will pick top choices . Did verify that pt wants to be a DNR and this was passed to clinical team      Patient has designated __son______________________ to participate in his/her discharge plan and to receive any needed information. Name: Leanne Mckeon as pt  Phone [phone] [employer]  PCP Verified by CM: Yes(Dr Crowell)  Last Visit to PCP: 05/09/19  Mode of Transport at Discharge:  Other (see comment)  Transition of Care Consult (CM Consult): Discharge Planning  Physical Therapy Consult: Yes  Occupational Therapy Consult: Yes  Current Support Network: Relative's Home(son)  Confirm Follow Up Transport: Family  Plan discussed with Pt/Family/Caregiver: Yes  Discharge Location  Discharge Placement: Skilled nursing facility

## 2019-06-04 NOTE — PROGRESS NOTES
PT screen received and chart reviewed. Per chart review, admitting diagnosis and relevant prior level of function indicate patient appropriate for skilled PT/OT evaluation. Please order PT/OT evaluation as appropriate to better determine benefit from skilled PT/OT treatment and formal discharge recommendations. PT/OT evaluation is indicated.      Thank you,     Delroy Paul, PT, DPT  Office Extension: [phone]

## 2019-06-04 NOTE — PROGRESS NOTES
Internal Medicine Progress Note    Patient's Name: Dank Mary  Admit Date: 6/3/2019  Length of Stay: 1      Assessment/Plan     Principal Problem:    Sepsis ([facility] (6/3/2019)    Active Problems:    Acute respiratory failure ([address] (6/3/2019)      Type 2 diabetes with nephropathy ([address] (3/23/2018)      PAD (peripheral artery disease) ([address] (6/3/2019)      Acute on chronic combined systolic and diastolic heart failure ([facility] [address] (6/4/2019)      hypothyroid ()      Essential hypertension (9/3/2015)      Severe obesity ([facility] (5/9/2019)      Hypoglycemia (6/3/2019)      Cellulitis (6/3/2019)    Sepsis  - possible 2/2 BLE cellulitis, PNA?  - PCCM consulted - appreciate  - IV abx: vanc/zosyn  - leukocytosis resolved    Acute Resp Failure  - on bipap  - tx CHF    Diabetes  - initially hypoglycemic  - now maintaining blood sugars  - monitor BG    Acute on chronic CHF  - BNP 957[address], left pleural effusion  - diuresis  - monitor BMP  - strict I&Os    PAD with arterial occlusion  - VS consulted - appreciate  - Hep gtt  - no emergent vascular intervention - pt unstable  - will eventually need aortogram with BLE r/o with possible intervention per VS    EMMETT  - monitor BMP    - Cont acceptable home medications for chronic conditions   - DVT protocol    I have personally reviewed all pertinent labs and films that have officially resulted over the last 24 hours. I have personally checked for all pending labs that are awaiting final results. Interval History     Per H&P, \"Nette Sotomayor is a [de-identified] y.o. female who was found down by her son. She had weakness and altered mental status. She also had SOB. She has a known history of Diabetes. In the ER she was also found to have hypoglycemia. She is noted to have decreased pulses bilaterally. She also was found to have significant respiratory distress and was started on BiPap. Vascular surgery was called because of decreased perfusion to both feet.   She has severe erythema involving both feet as well as her abdomen and below her breast.  There is significant concern for sepsis along with her acute respiratory failure. She is admitted for ongoing management. \"    Hypoglycemia initially managed with D10 bolus and D20 infusion. Vascular surgery recommended heparin gtt and imaging. BLE duplex - b/l PAD, mod stenosis in R CF artery, absent R PT artery consistent with occlusion, absent L distal pop/PT/AT arteries consistent with occlusion. 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 No emergent surgical intervention per VS until patient stabilizes. Patient also found to be in heart failure, bumex started. Subjective     Pt s/e @ bedside. No major events overnight. Pt somnolent, on bipap.     Objective     Visit Vitals  /53   Pulse 72   Temp 98.8 °F (37.1 °C)   Resp 12   Ht 5' 5\" (1.651 m)   Wt 110.4 kg (243 lb 6.2 oz)   SpO2 100%   BMI 40.50 kg/m²       Physical Exam:  General Appearance: somnolent, on bipap  HENT: normocephalic/atraumatic, dry mucus membranes  Neck: No JVD, supple  Lungs: CTA, on bipap  CV: RRR, no m/r/g  Abdomen: soft, non-tender, normal bowel sounds  Extremities: no cyanosis, no peripheral edema  Neuro: No focal deficits  Skin: BLE cellulitis    Intake and Output:  Current Shift:  06/04 0701 - 06/04 1900  In: -   Out: 100 [Urine:100]  Last three shifts:  06/02 1901 - 06/04 0700  In: 3670.6 [I.V.:3670.6]  Out: 1355 [Urine:1355]    Lab/Data Reviewed:  BMP:   Lab Results   Component Value Date/Time     (L) 06/04/2019 05:00 AM    K 4.0 06/04/2019 05:00 AM     06/04/2019 05:00 AM    CO2 24 06/04/2019 05:00 AM    AGAP 7 06/04/2019 05:00 AM     (H) 06/04/2019 05:00 AM    BUN 28 (H) 06/04/2019 05:00 AM    CREA 1.45 (H) 06/04/2019 05:00 AM    GFRAA 42 (L) 06/04/2019 05:00 AM    GFRNA 35 (L) 06/04/2019 05:00 AM     CBC:   Lab Results   Component Value Date/Time    WBC 11.2 06/04/2019 05:00 AM    HGB 9.9 (L) 06/04/2019 05:00 AM    HCT 31.1 (L) 06/04/2019 05:00 AM  06/04/2019 05:00 AM     ABG:   Lab Results   Component Value Date/Time    FIO2I 45 06/03/2019 03:51 PM       Imaging Reviewed:  [facility] Chest Port    Addendum Date: 6/4/2019    Addendum: Note: The final attending interpretation is without significant discrepancy relative to preliminary resident report. Result Date: 6/4/2019  EXAM: Portable semiupright chest radiograph. INDICATION: \"New R IJ CVL. \" COMPARISON: [facility] [facility], 2019. _______________ FINDINGS:     DEVICES:  Right internal jugular line projects in standard position with tip projecting in the SVC. LUNGS:           --Expansion:  Adequate. --Consolidation:  Left basilar /retrocardiac consolidation. --Pulmonary edema:  None detected. --Other:  Non-applicable. PLEURAL SPACE:          --Pleural effusion:  Possible left pleural effusion. --Pneumothorax:  None detected. _______________     IMPRESSION: New right internal jugular line projects in standard position. No conspicuous pneumothorax. Unchanged left basilar lung opacity -- atelectasis, aspiration, infection, or a combination, with possible left basilar effusion.  _______________       Medications Reviewed:  Current Facility-Administered Medications   Medication Dose Route Frequency    sodium chloride 0.9 % in dextrose 10% 1,040 mL infusion   IntraVENous CONTINUOUS    bumetanide (BUMEX) injection 1 mg  1 mg IntraVENous BID    [START ON 6/5/2019] VANCOMYCIN INFORMATION NOTE   Other ONCE    sodium chloride (NS) flush 5-10 mL  5-10 mL IntraVENous PRN    heparin 25,000 units in D5W 250 ml infusion  18-36 Units/kg/hr IntraVENous TITRATE    levothyroxine (SYNTHROID) tablet 112 mcg  112 mcg Oral ACB    piperacillin-tazobactam (ZOSYN) 3.375 g in 0.9% sodium chloride (MBP/ADV) 100 mL MBP  3.375 g IntraVENous Q8H    vancomycin (VANCOCIN) 1500 mg in  ml infusion  1,500 mg IntraVENous Q24H    VANCOMYCIN INFORMATION NOTE 1 Each  1 Each Other Rx Dosing/Monitoring    glucose chewable tablet 16 g  4 Tab Oral PRN    glucagon (GLUCAGEN) injection 1 mg  1 mg IntraMUSCular PRN    pantoprazole (PROTONIX) injection 40 mg  40 mg IntraVENous DAILY    hydrocortisone Sod Succ (PF) (SOLU-CORTEF) injection 100 mg  100 mg IntraVENous TID         [place] [id number]  Office:  [phone]  Pager: [phone]

## 2019-06-04 NOTE — ROUTINE PROCESS
Bedside and Verbal shift change report given to Richard (oncoming nurse) by Evelin Ware (offgoing nurse). Report included the following information SBAR, Kardex, ED Summary, Procedure Summary, Intake/Output, MAR, Accordion, Recent Results, Med Rec Status, Cardiac Rhythm AFIB w/ R BBB, Alarm Parameters  and Quality Measures.

## 2019-06-05 NOTE — PROGRESS NOTES
0700: Received report from [employer]. Assumed care of patient in bed in low locked position with call bell and phone in reach. Dual skin and med verify. 0930: Consult Dr. Monse Ndiaye on patient blood pressure. PRN hydralazine 20 mg frequency changed to q 4 hrs to decrease blood pressure as needed. 1000: Interdisciplinary rounds: PT/OT for assessment, transition patient of bi-pap and trial nasal cannula .

## 2019-06-05 NOTE — PROGRESS NOTES
Internal Medicine Progress Note    Patient's Name: Sabrina Bermudez  Admit Date: 6/3/2019  Length of Stay: 2      Assessment/Plan     Principal Problem:    Sepsis ([facility] [address] (6/3/2019)    Active Problems:    Acute respiratory failure ([address] (6/3/2019)      Type 2 diabetes with nephropathy ([facility] [address] (3/23/2018)      PAD (peripheral artery disease) ([facility] [address] (6/3/2019)      Acute on chronic combined systolic and diastolic heart failure ([facility] [address] (6/4/2019)      hypothyroid ()      Essential hypertension (9/3/2015)      Severe obesity ([facility] [address] (5/9/2019)      Hypoglycemia (6/3/2019)      Cellulitis (6/3/2019)    Sepsis  - possible 2/2 BLE cellulitis, PNA?  - PCCM consulted - appreciate  - IV abx: vanc/zosyn  - leukocytosis resolved  - wound care for BLE  - BCx positive for coag neg staph    Acute Resp Failure  - on bipap, transitioning to NC today  - tx CHF    Diabetes  - initially hypoglycemic  - now maintaining blood sugars  - monitor BG    Acute on chronic CHF  - BNP 11696, left pleural effusion  - diuresis  - monitor BMP  - echo done, results below  - strict I&Os    PAD with arterial occlusion  - VS consulted - appreciate  -  BLE duplex - b/l PAD, mod stenosis in R CF artery, absent R PT artery consistent with occlusion, absent L distal pop/PT/AT arteries consistent with occlusion. 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  - Hep gtt  - no emergent vascular intervention - pt unstable  - will eventually need aortogram with BLE r/o with possible intervention per VS    EMMETT  - monitor BMP  - creatinine rising    HTN  - monitor     - Cont acceptable home medications for chronic conditions   - DVT protocol    I have personally reviewed all pertinent labs and films that have officially resulted over the last 24 hours. I have personally checked for all pending labs that are awaiting final results. Interval History     Per H&P, \"Nette Napier is a [de-identified] y.o. female who was found down by her son. She had weakness and altered mental status.   She also had SOB. She has a known history of Diabetes. In the ER she was also found to have hypoglycemia. She is noted to have decreased pulses bilaterally. She also was found to have significant respiratory distress and was started on BiPap. Vascular surgery was called because of decreased perfusion to both feet. She has severe erythema involving both feet as well as her abdomen and below her breast.  There is significant concern for sepsis along with her acute respiratory failure. She is admitted for ongoing management. \"    Hypoglycemia initially managed with D10 bolus and D20 infusion. Vascular surgery recommended heparin gtt and imaging. BLE duplex - b/l PAD, mod stenosis in R CF artery, absent R PT artery consistent with occlusion, absent L distal pop/PT/AT arteries consistent with occlusion. 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 No emergent surgical intervention per VS until patient stabilizes. Patient also found to be in heart failure, bumex started. BCx positive for coag neg staph in 1/2 bottles. Subjective     Pt s/e @ bedside. No major events overnight. Pt more alert today. Can likely transfer to floor tomorrow.     Objective     Visit Vitals  /61   Pulse 92   Temp 98.6 °F (37 °C)   Resp 16   Ht 5' 5\" (1.651 m)   Wt 109.6 kg (241 lb 10 oz)   SpO2 100%   BMI 40.21 kg/m²       Physical Exam:  General Appearance: somnolent, but more alert than yesterday  HENT: normocephalic/atraumatic, dry mucus membranes  Neck: No JVD, supple  Lungs: CTA, on bipap  CV: RRR, no m/r/g  Abdomen: soft, non-tender, normal bowel sounds  Extremities: no cyanosis, no peripheral edema  Neuro: No focal deficits  Skin: BLE cellulitis with anterior lower leg skin sloughing    Intake and Output:  Current Shift:  06/05 0701 - 06/05 1900  In: 140.8 [I.V.:140.8]  Out: 310 [Urine:310]  Last three shifts:  06/03 1901 - 06/05 0700  In: 3192 [I.V.:3192]  Out: 2070 [Urine:2070]    Lab/Data Reviewed:  BMP:   Lab Results   Component Value Date/Time     (L) 06/05/2019 02:10 AM    K 4.0 06/05/2019 02:10 AM     06/05/2019 02:10 AM    CO2 20 (L) 06/05/2019 02:10 AM    AGAP 12 06/05/2019 02:10 AM     (H) 06/05/2019 02:10 AM    BUN 33 (H) 06/05/2019 02:10 AM    CREA 1.55 (H) 06/05/2019 02:10 AM    GFRAA 39 (L) 06/05/2019 02:10 AM    GFRNA 32 (L) 06/05/2019 02:10 AM     CBC:   Lab Results   Component Value Date/Time    WBC 13.9 (H) 06/05/2019 02:10 AM    HGB 10.0 (L) 06/05/2019 02:10 AM    HCT 31.2 (L) 06/05/2019 02:10 AM     06/05/2019 02:10 AM       Imaging Reviewed:  No results found. Echo 6/3/19  Interpretation Summary     · Left Ventricle: Estimated left ventricular ejection fraction is 51 - 55%. Visually measured ejection fraction. Moderate (grade 2) left ventricular diastolic dysfunction. · Left Atrium: Mildly dilated left atrium. · Right Ventricle: Mildly dilated right ventricle. Borderline low systolic function. · Right Atrium: Mildly dilated right atrium. · Mitral Valve: Mitral valve non-specific thickening. Trace mitral valve regurgitation. · Tricuspid Valve: Moderate tricuspid valve regurgitation is present. · Pulmonary Artery: Mild to moderate pulmonary hypertension. · Pericardium: There is a large left pleural effusion. Echo Findings     Left Ventricle Normal cavity size and systolic function (ejection fraction normal). Upper normal wall thickness. The estimated ejection fraction is 51 - 55%. Visually measured ejection fraction. There is moderate (grade 2) left ventricular diastolic dysfunction. Left Atrium Mildly dilated left atrium. Right Ventricle Mildly dilated right ventricle. Borderline low systolic function. Right Atrium Mildly dilated right atrium. Interatrial Septum Saline contrast not used. Aortic Valve Aortic valve not well visualized. Mitral Valve No stenosis. Mitral valve non-specific thickening. w/o reduced excursion. Trace regurgitation.    Tricuspid Valve Normal valve structure and no stenosis. Moderate tricuspid valve regurgitation. Pulmonary arterial systolic pressure is 54.2 mmHg. Mild to moderate pulmonary hypertension. Pulmonic Valve Pulmonic valve normal doppler findings. Aorta Normal aortic root, ascending aortic, and aortic arch. Pulmonary Artery Pulmonary arteries not well visualized. IVC/Hepatic Veins Inferior vena cava not well visualized. Pericardium Normal pericardium and no evidence of pericardial effusion. There is a large left pleural effusion.        Medications Reviewed:  Current Facility-Administered Medications   Medication Dose Route Frequency    hydrALAZINE (APRESOLINE) 20 mg/mL injection 20 mg  20 mg IntraVENous Q4H PRN    [START ON 6/6/2019] vancomycin (VANCOCIN) 1,250 mg in 0.9% sodium chloride 250 mL IVPB  1,250 mg IntraVENous Q24H    [START ON 6/8/2019] VANCOMYCIN INFORMATION NOTE   Other ONCE    insulin glargine (LANTUS) injection 10 Units  10 Units SubCUTAneous DAILY    insulin lispro (HUMALOG) injection   SubCUTAneous Q6H    dextrose 10% infusion 125-150 mL  125-150 mL IntraVENous PRN    acetaminophen (TYLENOL) tablet 650 mg  650 mg Oral Q4H PRN    metoprolol (LOPRESSOR) injection 5 mg  5 mg IntraVENous Q6H    bumetanide (BUMEX) injection 1 mg  1 mg IntraVENous BID    sodium chloride (NS) flush 5-10 mL  5-10 mL IntraVENous PRN    heparin 25,000 units in D5W 250 ml infusion  18-36 Units/kg/hr IntraVENous TITRATE    levothyroxine (SYNTHROID) tablet 112 mcg  112 mcg Oral ACB    piperacillin-tazobactam (ZOSYN) 3.375 g in 0.9% sodium chloride (MBP/ADV) 100 mL MBP  3.375 g IntraVENous Q8H    VANCOMYCIN INFORMATION NOTE 1 Each  1 Each Other Rx Dosing/Monitoring    glucose chewable tablet 16 g  4 Tab Oral PRN    glucagon (GLUCAGEN) injection 1 mg  1 mg IntraMUSCular PRN    pantoprazole (PROTONIX) injection 40 mg  40 mg IntraVENous DAILY    hydrocortisone Sod Succ (PF) (SOLU-CORTEF) injection 100 mg  100 mg IntraVENous TID Ara Richard PA-C  [address] [place] [address]  Hospitalist Division  Office:  [phone]  Pager: [phone]

## 2019-06-05 NOTE — ROUTINE PROCESS
Bedside and Verbal shift change report given to Justo Ramires   (oncoming nurse) by Kaylin Newsome RN   (offgoing nurse). Report included the following information SBAR, Kardex, MAR, Accordion and Recent Results.

## 2019-06-05 NOTE — PROGRESS NOTES
Patient place back on bipap during nap shallow breathing was noted. HR 72 spo2 100% patient stable at this time

## 2019-06-05 NOTE — CONSULTS
Pulmonary progress note    History  70-year-old woman with diabetes and peripheral arterial disease was found down by her son with altered mental status. She was hypoglycemic. Both lower extremities were erythematous with poor arterial pulses. She was started on BiPAP for respiratory hypercapnia. She had been vomiting and may have inadvertently overdosed on glipizide. More alert. States that she feels better than she did on admission    Exam  Afebrile. HR 65-80. -185. RR in mid teens    At least 2+ bilateral lower extremity edema. Possible slight improvement in the ear edema. No tenderness to palpation. Conjugate gaze. Tolerating BiPAP. Minimally coarse breath sounds bilaterally  Regular rate rhythm  Abdomen soft and nondistended    WBC 13.9, hemoglobin 10.0 and platelets 813. Sodium 133, bicarb 20, BUN 33 and creatinine 1.55    Multiple blood cultures are showing coag negative staph    Assessment  Altered mental status  Hypercapnia. Could likely tolerate more time off BiPAP  Coag negative staph bacteremia. Sensitivities pending  Bilateral lower extremity cellulitis? Bilateral lower extremity peripheral arterial disease  Lower extremity ischemia likely chronic  Diffuse edema and volume overload  Persistent hypoglycemia likely reflecting inadvertent glipizide overdose resolved  Hypervolemia hyponatremia improved with diuresis  Anemia of unclear etiology. Likely component of volume resuscitation  Generalized weakness. I suspect that she will require prolonged rehabilitation or nursing home care at discharge    The patient's mental status changes appear to be driven by a combination of acute respiratory failure, gram-positive bacteremia, and hypoglycemia. As we are treating all of the above, her mental status is improving. Plan  Antibiotics.   Tailor once bacteria identified  Discontinue glucose and decrease insulin  Continue anticoagulation  Diuresis to remain unchanged or slightly decrease  Increase activity    No contraindication to floor transfer

## 2019-06-05 NOTE — PROGRESS NOTES
[facility]  ICU Progress Note      Name: Sj Morales   : 1940   MRN: [id number]   Date: 2019 2:07 PM     [x]I have reviewed the flowsheet and previous days notes. Events overnight reviewed and discussed with nursing staff. Vital signs and records reviewed. Subjective:  19:    Code Status changed to DNR yesterday, by Son    Transitioned off BiPAP today & Currently on N/c. Will order BiPAP for qHS & N/c during the day    Wound Care RN consulted for B/L LE's   PT/OT consults today  Heparin gtt (Vascular surgery following). No intervention planned, currently     Blood sugars elevated. Will D/c D5 - LR gtt  SBP goal < 170. Requiring Hydralazine IVP. Will order Lopressor 5 mg IVP q-6 hours    Cr rising daily. (Received 1 dose of Toradol IVP last night for leg pain)         []The patient is unable to give any meaningful history or review of systems because the patient is:  []Intubated []Sedated   []Unresponsive      []The patient is critically ill on      []Mechanical ventilation []Pressors   []BiPAP []                 ROS:A comprehensive review of systems was negative except for that written in the HPI.     Medication Review:  · Pressors - None  · Sedation - None  · Antibiotics - Zosyn, Vanco  · Pain - APAP, PRN  · GI/ DVT - Protonix IVP / Heparin gtt        Vital Signs:    Visit Vitals  /61   Pulse 92   Temp 98.6 °F (37 °C)   Resp 16   Ht 5' 5\" (1.651 m)   Wt 109.6 kg (241 lb 10 oz)   SpO2 100%   BMI 40.21 kg/m²       O2 Device: Nasal cannula       Temp (24hrs), Av.6 °F (36.4 °C), Min:96.6 °F (35.9 °C), Max:98.6 °F (37 °C)       Intake/Output:   Last shift:       07 - 1900  In: 140.8 [I.V.:140.8]  Out: 310 [Urine:310]  Last 3 shifts: 1901 -  0700  In: 5115 [I.V.:3192]  Out:  [Urine:]    Intake/Output Summary (Last 24 hours) at 2019 1407  Last data filed at 2019 1300  Gross per 24 hour   Intake 976.2 ml   Output 1000 ml Net -23.8 ml         Physical Exam:    General: Alert. [place] x 1   No C/o's. HEENT:  Anicteric sclerae; pink palpebral conjunctivae; mucosa moist  Resp:  Symmetrical chest rise, no accessory muscle use. BS = B/L Coarse, decreased throughout (> bases). No rales/ wheezing/ rhonchi noted  CV:  Afib, rate controlled. No m/g/r  GI:  Abdomen soft, non-tender; (+) active bowel sounds  Extremities:  +2 pulses on B/L UE's.  +2 pulses on B/L Groins. Absent Pulses on B/L LE's (DP/PT). B/L LE's erythematous. +2 B/L LE's edema   Skin:  Warm; + B/L LE's erythema. Neurologic:  Alert. [place] x 1. Moves B/L UE's to commands. Moves LLE to command. No able to move RLE. No C/o's  Devices:  No NGT/OGT/ ETTube.  + Central line & Jackson.           DATA:     Current Facility-Administered Medications   Medication Dose Route Frequency    dextrose 5% lactated ringers infusion  25 mL/hr IntraVENous CONTINUOUS    hydrALAZINE (APRESOLINE) 20 mg/mL injection 20 mg  20 mg IntraVENous Q4H PRN    [START ON 6/6/2019] vancomycin (VANCOCIN) 1,250 mg in 0.9% sodium chloride 250 mL IVPB  1,250 mg IntraVENous Q24H    [START ON 6/8/2019] VANCOMYCIN INFORMATION NOTE   Other ONCE    insulin glargine (LANTUS) injection 10 Units  10 Units SubCUTAneous DAILY    insulin lispro (HUMALOG) injection   SubCUTAneous Q6H    dextrose 10% infusion 125-150 mL  125-150 mL IntraVENous PRN    bumetanide (BUMEX) injection 1 mg  1 mg IntraVENous BID    sodium chloride (NS) flush 5-10 mL  5-10 mL IntraVENous PRN    heparin 25,000 units in D5W 250 ml infusion  18-36 Units/kg/hr IntraVENous TITRATE    levothyroxine (SYNTHROID) tablet 112 mcg  112 mcg Oral ACB    piperacillin-tazobactam (ZOSYN) 3.375 g in 0.9% sodium chloride (MBP/ADV) 100 mL MBP  3.375 g IntraVENous Q8H    VANCOMYCIN INFORMATION NOTE 1 Each  1 Each Other Rx Dosing/Monitoring    glucose chewable tablet 16 g  4 Tab Oral PRN    glucagon (GLUCAGEN) injection 1 mg  1 mg IntraMUSCular PRN    pantoprazole (PROTONIX) injection 40 mg  40 mg IntraVENous DAILY    hydrocortisone Sod Succ (PF) (SOLU-CORTEF) injection 100 mg  100 mg IntraVENous TID         Labs: Results:       Chemistry Recent Labs     06/05/19  0210 06/04/19  0500 06/03/19  1620 06/03/19  0058   * 154* 71* 289*   * 131* 133* 130*   K 4.0 4.0 4.2 4.8    100 103 99*   CO2 20* 24 23 26   BUN 33* 28* 29* 27*   CREA 1.55* 1.45* 1.43* 1.40*   CA 8.4* 8.1* 8.2* 8.5   AGAP 12 7 7 5   BUCR 21* 19 20 19   AP  --   --  69 83   TP  --   --  6.2* 7.3   ALB  --   --  2.7* 3.1*   GLOB  --   --  3.5 4.2*   AGRAT  --   --  0.8 0.7*      CBC w/Diff Recent Labs     06/05/19  0210 06/04/19  0500 06/03/19  0058   WBC 13.9* 11.2 15.2*   RBC 3.65* 3.58* 4.24   HGB 10.0* 9.9* 11.9*   HCT 31.2* 31.1* 38.4    175 162   GRANS 91* 92* 84*   LYMPH 4* 5* 8*   EOS 0 0 0      Coagulation Recent Labs     06/05/19  1200 06/05/19  0210   APTT 69.4* >180.0*       Liver Enzymes Recent Labs     06/03/19  1620   TP 6.2*   ALB 2.7*   AP 69   SGOT 82*      ABG No results found for: PH, PHI, PCO2, PCO2I, PO2, PO2I, HCO3, HCO3I, FIO2, FIO2I   Microbiology Recent Labs     06/03/19  0650 06/03/19  0058 06/03/19  0052   CULT NO GROWTH 2 DAYS AEROBIC AND ANAEROBIC BOTTLES STAPHYLOCOCCUS SPECIES, COAGULASE NEGATIVE* NO GROWTH 2 DAYS          Telemetry: []Sinus []A-flutter []Paced    [x]A-fib []Multiple PVCs                  Imaging:  None recently         IMPRESSION:   Altered mental status  Hypercapnia still requiring BiPAP  Gram-positive cocci bacteremia with identification pending  Bilateral lower extremity cellulitis? Bilateral lower extremity peripheral arterial disease  Lower extremity ischemia likely chronic  Diffuse edema and volume overload  Persistent hypoglycemia likely reflecting inadvertent glipizide overdose  Hypervolemia hyponatremia still warranting diuresis  Anemia of unclear etiology.   Likely component of volume resuscitation  Generalized weakness     RECOMMENDATIONS:   · Resp:  No recent ABG. Hallie BiPAP. Transitioned to N/c this AM.  Hallie well with 02 sat's high 90's. Will continue BiPAP qHS & as needed. Titrate FiO2/ supp O2 for SpO2 >90%  · I/D: Tm/c Afebrile. WBC elevated but stable (13.9 K). +UTI via U/a (6/3/2019). + BCx. Continues on ABX (Vanco / Zosyn). · Hem/Onc: Hct / PLT's stable (31.2 & 183 K). PTT 69.4 (Noon today). Daily CBC. PTT checks per Heparin gtt protocol  · CVS: Stable. No gtt's. Tele = Afib, rate controlled. Goal SBP < 170   Add Lopressor 5 mg IVP q-6 hours. Continue Hydralazine IVP PRN   · Metabolic: Daily BMP; monitor e-lytes; replace PRN  · Renal: I > O's despite Diuretics (Bumex 1 mg IVP BID). Cr rising daily (1.55 this AM). Would avoid NSAIDS. Na stable (133). ?? Renal consult. Trend Renal indices; Diuresis, Jackson to BSD  · Endocrine: Hypoglycemia corrected. D/c D5-LR gtt. POC Glucose q6; Continue Synthroid for Thyroid d/o  · GI: NPO except meds. Consider TF's tomorrow. Zofran PRN for N/V   · Musc/Skin: Wound Care Consulted for B/L LE's Mgmt   · Neuro: Alert & [place] x 1. Not able to move RLE.  NAD. No C/o's. · Vascular Surgery following. Continues on Heparin gtt. No new imaging done & no intervention planned.    · Fluids: None  · PT/OT Consults placed  · Solucortef (100 mg IVP TID) to end tomorrow afternoon  · Discussed in Interdisciplinary Rounds (Continue ICU Care to monitor respiratory & mental status)     Best Practices/ Safety Bundles:  · Sepsis Bundle per Hospital Protocol  · CAUTI Bundle  · Electrolyte Replacement Bundle  · Glycemic control goal <180; avoid Hypoglycemia  · IHI ICU Bundles:  ·  Central Line Bundle Followed  and Jackson Bundle Followed    · Mech Vent patients:   · VAP bundle, Aim to keep peak plateau pressure 39-28IG H2O  · Aspiration Precautions - HOB >30'  · Daily sedation holiday as indicated  · SBT as tolerated/appropriate  · Titrate FiO2 for SpO2 >94%  · Aggressive Pulmonary Hygeiene  · Stress ulcer prophylaxis. Protonix IVP   · DVT prophylaxis. Heparin gtt  · Need for Lines, lozoya assessed. · Restraints need. · Palliative care evaluation.       The patient is: [x] acutely ill Risk of deterioration: [] moderate    [] critically ill  [x] high     [x]See my orders for details    My assessment/plan was discussed with:  [x]Nursing []PT/OT    []Respiratory therapy []   []Family []         Critical Care time = [address] [place], [place]  06/05/19  Pulmonary, [address] [place] [address],[address] [facility]

## 2019-06-05 NOTE — PROGRESS NOTES
Patient received resting comfortably on the BIPAP at 16/8. FIO2 has been weaned to 30% this evening and patient has tolerated wean with SPO2 maintained at %. Breath sounds are diminished and coarse bilaterally. No increased work of breathing. Alarms are set and functioning. Emergency equipment is available at the bedside. Will continue to monitor.

## 2019-06-05 NOTE — DIABETES MGMT
Glycemic Control:Pt previously with hypoglycemia, now hyperglycemic with 6 BG>200. Recommend adding BG monitoring, Lantus 10 units every 24 hrs and corrective lispro.  Geraldo RAE  Lab Results   Component Value Date/Time    Hemoglobin A1c 5.2 06/04/2019 05:00 AM

## 2019-06-05 NOTE — PROGRESS NOTES
Physical Exam   Skin:         Primary Nurse Mary Jo Gutiérrez, RN and lashae redd rn, RN performed a dual skin assessment on this patient .

## 2019-06-05 NOTE — PROGRESS NOTES
2000 Bedside shift change report given to juanis rn (oncoming nurse) by [employer] rn (offgoing nurse). Report included the following information SBAR, Kardex and Recent Results. Assessment completed. Dual drip verification completed. Dual skin assessment completed. Patient incontinent of stool formed, brown, cleansed and mepliex changed. Hob elevated 30 degrees. Side rails up x 3, call light within reach. 2200 resting in bed. Will continue to monitor. 2202 hydralazine given for elevated bp.  0000 reassessment completed. Oral and lozoya care completed. 0205 lab in to collected am labs periphereal since on heparing drip.  0220 patient moaning, assessed patient and state hr leg ar cramping. Encouraged to point toes ,Dr Vera Espinosa paged. 0224 DR Alexis notified of leg cramps, an ordered received for one toradol, verified okay with [facility] pharm D if okay to give with allergies(morphine, tylenol,tramadol) and receiving heparin, states okay. 1648 Bee Alexis notified sbp elevated and last hydralazine given at 2202, and patient has received toradol, an one time order received. 0316 resting with eyes closed. 0400 reassessment completed. Oral and lozoya care completed. 0545 bed bath completed. New pad under each leg, weeping has decreased. 56 dr Katie Negron notified that uop last hour 20 cc. And given last blood sugar 289 and  Midnight blood sugar 247, blood sugar at 2057 239, orders to change ivf received, and to monitor uop since receiving vanc in 500 cc now. 0750 Bedside shift change report given to henrietta apodaca (oncoming nurse) by juanis rn (offgoing nurse). Report included the following information SBAR, Kardex and Recent Results. Side rails up x 3, call light within reach. Hob elevated 30 degrees. Patient remains on bipap, dual drip verification completed. Dual skin assessment completed.

## 2019-06-05 NOTE — WOUND CARE
Wound/Ostomy Nurse Progress Note          Patient: Doreen Ocasio                                                                                      HHO:[birth year]    MRN: [id number]            Situation: wound care consult for pt who was admitted for fall, hypoglycemia, AMS, and has multiple wounds    Assessment: pt respondes to voice commands, has +2/+3 pitting edema to BLE weeping clear serous fluid and +2/+3 edema to BUE. Large bruise to right upper thigh and excoriation underneath right breast. Right leg has wound to anterior lower region measuring 6 x 4.9 x 0.2cm and a posterior open wound measuring 8 x 1.9 x 0.3cm. Both wounds have a pink and yellow base, pink warm periwound,  and are draining clear serous fluid. Right medial foot has a 2.8 x 0.2 x0.2 fissure that had black cat hair or fuzz stuck to wound base. Once cleaned the wound base was dry and pink. A DTI was discovered on the right lateral heel measuring 2.2 x 2.9. Left foot has a 1.7 x 0.3 fissure to the lateral plantar region also with cat hair or black fuzz stuck to the base. Once wound was cleaned the wound was pink and dry. The gluteal fold/ sacral area has a stage II with pink wound bed with small eraser sized DTI in the center. Recommendation: Wash underneath breast and pannus with mild soap and water and dry thoroughly. Apply Interdry (available at materials management) to wick moisture. Gently wash and dry bilateral legs with mild soap and water and dry thoroughly. Apply xeroform to all open leg and feet wounds and cover w/ 4x4 gauze and wrap w/ kerlex. Change daily or PRN if wet or soiled. Apply heel Meplex's to bilateral heels and check daily for skin breakdown. Keep Prevelon boots on heels for pressure injury protection.  Apply Sacral Meplex to open areas of sacrum, look under dressing daily for additional skin breakdown and change Q 3 days or PRN if soiled or wet. 06/05/19 1419   Wound   Date First Assessed/Time First Assessed: 06/05/19 1350     Dressing Status Other (Comment)  (open to air)   Wound Length (cm) 6 cm   Wound Width (cm) 4.9 cm   Wound Depth (cm) 0.2 cm   Wound Volume (cm^3) 5.88 cm^3   Condition of [facility]   Assessment Drainage;Painful;Swelling   Tissue Type Percent Yellow 100   Drainage Amount Moderate   Drainage Color Serous   [place]-wound Assessment Edema;Pink   Cleansing and Cleansing Agents  Dermal wound cleanser   Dressing Type Applied Gauze;Gauze wrap (alexandra); Xeroform   Wound Foot Left;Lateral;Plantar   Date First Assessed: 06/03/19   Primary Wound Type: Fissure  Location: Foot  Wound Location Orientation: Left;Lateral;Plantar   Dressing Status Other (Comment)  (open to air)   Wound Length (cm) 1.7 cm   Wound Width (cm) 0.3 cm   Wound Depth (cm) 0.3 cm   Wound Volume (cm^3) 0.15 cm^3   Condition of Base Other (comment)  (open crack, dry)   Assessment Dry;Pink   Tissue Type Percent Pink 100   Drainage Amount None   Cleansing and Cleansing Agents  Dermal wound cleanser   Dressing Changed Changed/New   Dressing Type Applied Gauze;Gauze wrap (alexandra); Xeroform   Wound Heel Lateral   Date First Assessed/Time First Assessed: 06/05/19 1350   Present on Hospital Admission: No  Primary Wound Type: Pressure Injury  Location: Heel  Wound Location Orientation: Lateral   Dressing Status Other (Comment)  (open to air)   Pressure Injury Deep Tissue Injury   Wound Length (cm) 2.2 cm   Wound Width (cm) 2.9 cm   Tissue Type Percent Maroon/Purple 100 %   Drainage Amount None   Cleansing and Cleansing Agents  Dermal wound cleanser   Dressing Changed Changed/New   Dressing Type Applied Foam  (heel meplex)   Wound Foot Medial;Right   Date First Assessed: 06/03/19   Primary Wound Type: Fissure  Location: Foot  Wound Location Orientation: Medial;Right   Dressing Status Other (Comment)  (open to air)   Wound Length (cm) 2.8 cm   Wound Width (cm) 0.2 cm   Wound Depth (cm) 0.2 cm   Wound Volume (cm^3) 0.11 cm^3   Condition of Edges Open   Assessment Dry   Tissue Type Percent Pink 100   Drainage Amount None   Cleansing and Cleansing Agents  Dermal wound cleanser   Dressing Changed Changed/New   Dressing Type Applied Gauze wrap (alexandra); Xeroform   Wound Leg lower Posterior;Right   Date First Assessed: 06/05/19   Primary Wound Type: Vascular  Location: Leg lower  Wound Location Orientation: Posterior;Right   Dressing Status Other (Comment)  (oopen to air)   Wound Length (cm) 8 cm   Wound Width (cm) 1.9 cm   Wound Depth (cm) 0.2 cm   Wound Volume (cm^3) 3.04 cm^3   Condition of Base [place]   Assessment Drainage;Edema   Drainage Color Serous   Wound Odor None   [place]-wound Assessment Edema;Pink   Cleansing and Cleansing Agents  Dermal wound cleanser   Dressing Changed Changed/New   Dressing Type Applied Gauze;Gauze wrap (alexandra); Xeroform   Wound Gluteal fold/cleft   Date First Assessed: 06/03/19   Primary Wound Type: Pressure Injury  Location: Gluteal fold/cleft   Dressing Status Removed   Dressing Type Foam   Wound Length (cm) 11.4 cm   Wound Width (cm) 8 cm   Wound Depth (cm) 0.2 cm   Wound Volume (cm^3) 18.24 cm^3   Assessment Pink;Painful;Red   Tissue Type Percent Maroon/Purple 10 %   Tissue Type Percent Pink 80   Tissue Type Percent Red 10   Drainage Amount Scant   Drainage Color Sanguinous   Cleansing and Cleansing Agents  Dermal wound cleanser   Dressing Changed Changed/New   Dressing Type Applied Foam  (meplex sacrum)

## 2019-06-05 NOTE — PROGRESS NOTES
Problem: Gas Exchange - Impaired  Goal: *Absence of hypoxia  Outcome: Progressing Towards Goal   Respiratory Therapy Assessment Care Plan    Patient:  Wendie Richardson [de-identified] y.o. female 6/5/2019 8:12 AM    Hypoglycemia [E16.2]  PAD (peripheral artery disease) ([facility] [address] [I73.9]  Diabetes ([facility] [address] [E11.9]      Chest X-RAY:   Results from Hospital Encounter encounter on 06/03/19   XR CHEST PORT    Addendum Addendum:    Note: The final attending interpretation is without significant  discrepancy relative to preliminary resident report. Ryan Grider MD 6/4/2019  9:15 AM          Impression IMPRESSION:    New right internal jugular line projects in standard position. No conspicuous  pneumothorax. Unchanged left basilar lung opacity -- atelectasis, aspiration, infection, or a  combination, with possible left basilar effusion. _______________               XR PELV 1 OR 2 V    Impression IMPRESSION:  Rotated projection of the pelvis with degenerative changes as  described. No superimposed acute radiographic abnormality. XR FEMUR RT 2 VS    Impression IMPRESSION:    Degenerative changes involving the right femur as described without radiographic  evidence of acute abnormality.                Vital Signs:   Visit Vitals  /57   Pulse 74   Temp 97.8 °F (36.6 °C)   Resp 15   Ht 5' 5\" (1.651 m)   Wt 109.6 kg (241 lb 10 oz)   SpO2 99%   BMI 40.21 kg/m²           Indications for treatment: found unresponsive      Plan of care: treat respiratory distress         Goal: trial off bipap as tolerated

## 2019-06-05 NOTE — PROGRESS NOTES
Problem: Falls - Risk of  Goal: *Absence of Falls  Description  Document Lex Reas Fall Risk and appropriate interventions in the flowsheet. Outcome: Progressing Towards Goal     Problem: Pressure Injury - Risk of  Goal: *Prevention of pressure injury  Description  Document Tate Scale and appropriate interventions in the flowsheet. Outcome: Progressing Towards Goal     Problem: Hypertension  Goal: *Blood pressure within specified parameters  Outcome: Progressing Towards Goal     Problem: Diabetes Self-Management  Goal: *Using medications safely  Description  State effect of diabetes medications on diabetes; name diabetes medication taking, action and side effects. Outcome: Progressing Towards Goal     Problem: Diabetes Self-Management  Goal: *Monitoring blood glucose, interpreting and using results  Description  Identify recommended blood glucose targets  and personal targets.   Outcome: Progressing Towards Goal     Problem: Pain  Goal: *Control of Pain  Outcome: Progressing Towards Goal     Problem: Tissue Perfusion - Cardiopulmonary, Altered  Goal: *Optimize tissue perfusion  Outcome: Progressing Towards Goal     Problem: Tissue Perfusion - Cardiopulmonary, Altered  Goal: *Absence of hypoxia  Outcome: Progressing Towards Goal     Problem: Fluid Volume - Risk of, Imbalanced  Goal: *Balanced intake and output  Outcome: Progressing Towards Goal

## 2019-06-05 NOTE — PROGRESS NOTES
Problem: Falls - Risk of  Goal: *Absence of Falls  Description  Document Tia Manus Fall Risk and appropriate interventions in the flowsheet. Outcome: Progressing Towards Goal     Problem: Pressure Injury - Risk of  Goal: *Prevention of pressure injury  Description  Document Tate Scale and appropriate interventions in the flowsheet. Outcome: Progressing Towards Goal     Problem: Hypertension  Goal: *Blood pressure within specified parameters  Outcome: Progressing Towards Goal  Goal: *Fluid volume balance  Outcome: Progressing Towards Goal  Goal: *Labs within defined limits  Outcome: Progressing Towards Goal     Problem: Diabetes Self-Management  Goal: *Disease process and treatment process  Description  Define diabetes and identify own type of diabetes; list 3 options for treating diabetes. Outcome: Progressing Towards Goal  Goal: *Incorporating nutritional management into lifestyle  Description  Describe effect of type, amount and timing of food on blood glucose; list 3 methods for planning meals. Outcome: Progressing Towards Goal  Goal: *Incorporating physical activity into lifestyle  Description  State effect of exercise on blood glucose levels. Outcome: Progressing Towards Goal  Goal: *Developing strategies to promote health/change behavior  Description  Define the ABC's of diabetes; identify appropriate screenings, schedule and personal plan for screenings. Outcome: Progressing Towards Goal  Goal: *Using medications safely  Description  State effect of diabetes medications on diabetes; name diabetes medication taking, action and side effects. Outcome: Progressing Towards Goal  Goal: *Monitoring blood glucose, interpreting and using results  Description  Identify recommended blood glucose targets  and personal targets.   Outcome: Progressing Towards Goal  Goal: *Prevention, detection, treatment of acute complications  Description  List symptoms of hyper- and hypoglycemia; describe how to treat low blood sugar and actions for lowering  high blood glucose level. Outcome: Progressing Towards Goal  Goal: *Prevention, detection and treatment of chronic complications  Description  Define the natural course of diabetes and describe the relationship of blood glucose levels to long term complications of diabetes.   Outcome: Progressing Towards Goal  Goal: *Developing strategies to address psychosocial issues  Description  Describe feelings about living with diabetes; identify support needed and support network  Outcome: Progressing Towards Goal  Goal: *Sick day guidelines  Outcome: Progressing Towards Goal  Goal: *Patient Specific Goal (EDIT GOAL, INSERT TEXT)  Outcome: Progressing Towards Goal     Problem: Pain  Goal: *Control of Pain  Outcome: Progressing Towards Goal  Goal: *PALLIATIVE CARE:  Alleviation of Pain  Outcome: Progressing Towards Goal     Problem: Tissue Perfusion - Cardiopulmonary, Altered  Goal: *Optimize tissue perfusion  Outcome: Progressing Towards Goal  Goal: *Absence of hypoxia  Outcome: Progressing Towards Goal     Problem: Fluid Volume - Risk of, Imbalanced  Goal: *Balanced intake and output  Outcome: Progressing Towards Goal     Problem: Patient Education: Go to Patient Education Activity  Goal: Patient/Family Education  Outcome: Progressing Towards Goal     Problem: Discharge Planning  Goal: *Discharge to safe environment  Outcome: Progressing Towards Goal     Problem: Gas Exchange - Impaired  Goal: *Absence of hypoxia  Outcome: Progressing Towards Goal

## 2019-06-05 NOTE — DIABETES MGMT
NUTRITIONAL ASSESSMENT GLYCEMIC CONTROL/ PLAN OF CARE     Lizy Fay           [de-identified] y.o.           6/3/2019                 1. Acute metabolic encephalopathy due to hypoglycemia    2. Hypothermia, initial encounter    3. Acute congestive heart failure, unspecified heart failure type ([address]    4. Acute respiratory failure with hypercapnia (HCC)    5. Arterial occlusion, lower extremity (HCC)    DM, cellulitis     INTERVENTIONS/PLAN:   Monitor glycemic control. Pt previously with hypoglycemia, now hyperglycemic. Consider corrective lispro-low dose. ASSESSMENT:   Nutritional Status:  Pt is overweight related to excess caloric intake as evidenced by 195% ideal weight and BMI= 40.5kg/m2. Pt meets criteria for morbid obesity. Diagnosis-Intake: Inadequate protein-energy intake related to respiratory distress AEB inability to take in oral nourishment while on bipap. Diabetes Management:     Recent blood glucose:       Lab Results   Component Value Date/Time     (H) 06/04/2019 05:00 AM    GLUCPOC 226 (H) 06/04/2019 03:51 PM    GLUCPOC 216 (H) 06/04/2019 12:53 PM    GLUCPOC 165 (H) 06/04/2019 10:34 AM     Within target range (non-ICU: <140; ICU<180): [] Yes   [x]  No  Current Insulin regimen: n/a  Home medication/insulin regimen:   Key Antihyperglycemic Medications             glipiZIDE SR (GLUCOTROL XL) 5 mg CR tablet TAKE TWO TABLETS BY MOUTH EVERY MORNING AND TAKE ONE TABLET BY MOUTH EVERY EVENING WITH MEALS      HbA1c: recent pending  Adequate glycemic control PTA:  [] Yes  [x] No     SUBJECTIVE/OBJECTIVE:   Information obtained from:ICU rounds  Skin documentation reviewed.   Diet: npo on bipap    Medications: [x]                Reviewed     Most Recent POC Glucose:   Recent Labs     06/04/19  0500 06/03/19  1620 06/03/19  0058   * 71* 289*         Labs:   Lab Results   Component Value Date/Time    Hemoglobin A1c 7.6 (H) 09/20/2018 04:33 PM   not current  Lab Results   Component Value Date/Time Sodium 131 (L) 06/04/2019 05:00 AM    Potassium 4.0 06/04/2019 05:00 AM    Chloride 100 06/04/2019 05:00 AM    CO2 24 06/04/2019 05:00 AM    Anion gap 7 06/04/2019 05:00 AM    Glucose 154 (H) 06/04/2019 05:00 AM    BUN 28 (H) 06/04/2019 05:00 AM    Creatinine 1.45 (H) 06/04/2019 05:00 AM    Calcium 8.1 (L) 06/04/2019 05:00 AM    Albumin 2.7 (L) 06/03/2019 04:20 PM       Anthropometrics: IBW : 56.7 kg (125 lb), % IBW (Calculated): 194.71 %, BMI (calculated): 40.5  Wt Readings from Last 1 Encounters:   06/04/19 110.4 kg (243 lb 6.2 oz)      Ht Readings from Last 1 Encounters:   06/04/19 5' 5\" (1.651 m)       Estimated Nutrition Needs:  1550 Kcals/day, Protein (g): 75 g Fluid (ml): 1800 ml  Based on:   []          Actual BW    [x]          IBW   []            Adjusted BW      Nutrition Diagnoses:      Pt is overweight related to excess caloric intake as evidenced by 194% ideal weight and BMI=40.5 kg/m2. Pt meets criteria for morbid  Obesity. Recent A1c pending. Nutrition Interventions: glucose monitoring, diabetic diet when off bipap  Goal:   Intake will meet 75% of energy and protein requirements by 6/9. Blood glucose will be within target range of  mg/dL by 6/7/19. Gradual weight loss post discharge 1 lb per week.      Nutrition Monitoring and Evaluation      []     Monitor po intake on meal rounds  [x]     Continue inpatient monitoring and intervention  []     Other:      Nutrition Risk:  [x]   High     []  Moderate    []  Minimal/Uncompromised    Cirilo Wilcox, RD  pgr [phone]

## 2019-06-06 NOTE — PROGRESS NOTES
Vest therapy initiated. Vitals remained stable throughout therapy. No complications or issues. HR 65, SpO2 98, RR 22, /101. Fair tolerance.

## 2019-06-06 NOTE — PROGRESS NOTES
Physical Exam  
Skin:  
 
  
0730Dual skin assessment with night Nurse Drip checked done. On Heparin gtt @ 9 units (see MAR) 0800 Assessment completed( see flow sheet).

## 2019-06-06 NOTE — DIABETES MGMT
NUTRITIONAL Re- ASSESSMENT GLYCEMIC CONTROL/ PLAN OF CARE Doreen Ocasio           [de-identified] y.o.           6/3/2019 1. Acute metabolic encephalopathy due to hypoglycemia 2. Hypothermia, initial encounter 3. Acute congestive heart failure, unspecified heart failure type ([address] 4. Acute respiratory failure with hypercapnia (HCC) 5. Arterial occlusion, lower extremity (HCC)   
DM, cellulitis INTERVENTIONS/PLAN:  
 Pt previously with hypoglycemia, now hyperglycemic. Recommend 12 units Lantus plus corrective lispro. Pt for swallowing evaluation today. Recommend 2gNa  1500 calorie Diabetic diet when solid texture resumed. ASSESSMENT:  
Nutritional Status: 
Pt is overweight related to excess caloric intake as evidenced by 195% ideal weight and BMI= 40.5kg/m2. Pt meets criteria for morbid obesity. Diagnosis-Intake: Inadequate protein-energy intake related to respiratory distress AEB inability to take in oral nourishment while on bipap, now off bipap. Diabetes Management:  
Recent blood glucose:    
 
Lab Results Component Value Date/Time  (H) 06/06/2019 04:53 AM  
 GLUCPOC 174 (H) 06/06/2019 05:30 AM  
 GLUCPOC 173 (H) 06/06/2019 12:26 AM  
 GLUCPOC 237 (H) 06/05/2019 05:00 PM  
 
Within target range (non-ICU: <140; ICU<180): [] Yes   [x]  No 
Current Insulin regimen: 10 units Lantus plus corrective lispro Home medication/insulin regimen:  
Key Antihyperglycemic Medications   
    
  
 glipiZIDE SR (GLUCOTROL XL) 5 mg CR tablet TAKE TWO TABLETS BY MOUTH EVERY MORNING AND TAKE ONE TABLET BY MOUTH EVERY EVENING WITH MEALS HbA1c:5.2 % Adequate glycemic control PTA:  [x] Yes  [] No 
  
SUBJECTIVE/OBJECTIVE: Information obtained from:ICU rounds Skin documentation reviewed. Diet: npo on bipap Medications: [x]                Reviewed Most Recent POC Glucose:  
Recent Labs  
  06/06/19 
0453 06/05/19 
0210 06/04/19 
0500 06/03/19 
1620 * 256* 154* 71* Labs:  
Lab Results Component Value Date/Time Hemoglobin A1c 5.2 06/04/2019 05:00 AM  
not current Lab Results Component Value Date/Time Sodium 135 (L) 06/06/2019 04:53 AM  
 Potassium 3.8 06/06/2019 04:53 AM  
 Chloride 102 06/06/2019 04:53 AM  
 CO2 26 06/06/2019 04:53 AM  
 Anion gap 7 06/06/2019 04:53 AM  
 Glucose 168 (H) 06/06/2019 04:53 AM  
 BUN 38 (H) 06/06/2019 04:53 AM  
 Creatinine 1.69 (H) 06/06/2019 04:53 AM  
 Calcium 8.4 (L) 06/06/2019 04:53 AM  
 Albumin 2.7 (L) 06/03/2019 04:20 PM  
 
 
Anthropometrics: IBW : 56.7 kg (125 lb), % IBW (Calculated): 194.71 %, BMI (calculated): 42 Wt Readings from Last 1 Encounters:  
06/06/19 114.5 kg (252 lb 6.8 oz) Ht Readings from Last 1 Encounters:  
06/04/19 5' 5\" (1.651 m) Estimated Nutrition Needs:  1550 Kcals/day, Protein (g): 75 g Fluid (ml): 1800 ml Based on:   []          Actual BW    [x]          IBW   []            Adjusted BW   
 
Nutrition Diagnoses:  
   Pt is overweight related to excess caloric intake as evidenced by 194% ideal weight and BMI=40.5 kg/m2. Pt meets criteria for morbid  Obesity. A1c=5.2% equivalent  to ave Blood Glucose of 96 mg/dl for 2-3 months prior to admission, indicating pt may need a reduction of her glipizide at discharge. Nutrition Interventions: Monitor glycemic controland meal intake when  diet resumed. Goal:  
Intake will meet 75% of energy and protein requirements by 6/11. Blood glucose will be within target range of  mg/dL by 697/19. Gradual weight loss post discharge 1 lb per week by 6/16. Nutrition Monitoring and Evaluation []     Monitor po intake on meal rounds 
[x]     Continue inpatient monitoring and intervention 
[]     Other: 
 
 
Nutrition Risk:  [x]   High     []  Moderate    []  Minimal/Uncompromised Steve Hollis, RD 
pgr [phone]

## 2019-06-06 NOTE — PROGRESS NOTES
Physical Exam  
Skin:  
 
  
0730Dual skin assessment with night Nurse Drip checked done. On Heparin gtt @ 9 unuts (see MAR) 0800 Assessment completed( see flow sheet). 0930 Resp Therapist attempted to do ABG but no success. Pt is put on nasal cannula @ 3 l/min by Resp therapist. 
Mouth care done. Chest physiotherapy done by Resp therapist. 
 
1000 Discussed care with Multidisciplinary rounds. Pt is put on Chair position in bed. For  PT/OT eval and treatment . 1140 Started on Lasix gtt @ 10 mg/hr. 1220 Nursing swallow eval at bedside done. Pt coughs with thin liquids. 1315 pt is seen by Speech Therapist for swallow eval. 
 
1339 Result of aptt noted. Heparin gtt stopped x1 hour per protocol. 1443 Restarted Heparin gtt at 6 units/kg/hr. Next aptt due 1939. 
 
1500 Family member at bedside; updated of pt's condition. 1548 Complaining of pain on legs and hip despite repositioning and Tylenol. Utram po given as ordered. 1600 Reassessment done. Voiced no complaint 1716 Urine output did not  yet. Lasix gtt increased to 20 mg/hr as ordered. 1800Pt is sleeping but easily awaken for dinner. Ate only 5 % of dinner. 1930 Bedside and Verbal shift change report given to Travis Sexton RN (oncoming nurse) by Janell Gonzalez RN 
 (offgoing nurse). Report included the following information SBAR, Kardex, Intake/Output, MAR, Recent Results and Cardiac Rhythm Afib.

## 2019-06-06 NOTE — PROGRESS NOTES
Internal Medicine Progress Note Patient's Name: Anamaria Alvarado Admit Date: 6/3/2019 Length of Stay: 3 Assessment/Plan Principal Problem: 
  Sepsis ([facility] (6/3/2019) Active Problems: 
  Acute respiratory failure ([address] (6/3/2019) Type 2 diabetes with nephropathy ([facility] (3/23/2018) PAD (peripheral artery disease) ([facility] (6/3/2019) Acute on chronic combined systolic and diastolic heart failure ([facility] (6/4/2019) 
 
  hypothyroid () Essential hypertension (9/3/2015) Severe obesity ([facility] (5/9/2019) Hypoglycemia (6/3/2019) Cellulitis (6/3/2019) Sepsis - possible 2/2 BLE cellulitis, PNA? 
- PCCM consulted - appreciate - IV abx: vanc/zosyn 
- leukocytosis resolved - wound care for BLE 
- 1/2 BCx 6/3 positive for coag neg staph 
- repeat BCx 6/6 Acute Resp Failure 
- on bipap, attempt to transition to NC again today - tx CHF Diabetes 
- initially hypoglycemic 
- now maintaining blood sugars 
- monitor BG 
- lantus/SSI Acute on chronic CHF 
- BNP [id number], left pleural effusion 
- diuresis 
- monitor BMP 
- echo done, results below 
- strict I&Os PAD with arterial occlusion - VS consulted - appreciate -  BLE duplex - b/l PAD, mod stenosis in R CF artery, absent R PT artery consistent with occlusion, absent L distal pop/PT/AT arteries consistent with occlusion. 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 
- Hep gtt 
- no emergent vascular intervention - pt unstable 
- will eventually need aortogram with BLE r/o with possible intervention per VS 
 
EMMETT 
- monitor BMP 
- creatinine rising HTN 
- monitor  
- add PO meds 
 
- Cont acceptable home medications for chronic conditions  
- DVT protocol I have personally reviewed all pertinent labs and films that have officially resulted over the last 24 hours. I have personally checked for all pending labs that are awaiting final results. Interval History Per H&P, \"Nette Marques is a [de-identified] y.o. female who was found down by her son. She had weakness and altered mental status. She also had SOB. She has a known history of Diabetes. In the ER she was also found to have hypoglycemia. She is noted to have decreased pulses bilaterally. She also was found to have significant respiratory distress and was started on BiPap. Vascular surgery was called because of decreased perfusion to both feet. She has severe erythema involving both feet as well as her abdomen and below her breast.  There is significant concern for sepsis along with her acute respiratory failure. She is admitted for ongoing management. \" Hypoglycemia initially managed with D10 bolus and D20 infusion. Vascular surgery recommended heparin gtt and imaging. BLE duplex - b/l PAD, mod stenosis in R CF artery, absent R PT artery consistent with occlusion, absent L distal pop/PT/AT arteries consistent with occlusion. 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 No emergent surgical intervention per VS until patient stabilizes. Wound Care consulted. Patient also found to be in heart failure, bumex started. BCx 6/3 positive for coag neg staph in 1/2 bottles. Repeat BCx done 6/6. Creatinine noted to be rising gradually. PT/OT recommending SNF. ST recs puree/nectar thick liquids. Subjective Pt s/e @ bedside. No major events overnight. Somnolent. Attempt transition to NC again today. Objective Visit Vitals /53 Pulse (!) 57 Temp 98.5 °F (36.9 °C) Resp 19 Ht 5' 5\" (1.651 m) Wt 114.5 kg (252 lb 6.8 oz) SpO2 99% BMI 42.01 kg/m² Physical Exam: 
General Appearance: somnolent Neck: No JVD, supple Lungs: CTA, on bipap CV: RRR, no m/r/g Abdomen: soft, non-tender, normal bowel sounds Extremities: no cyanosis, no peripheral edema Neuro: No focal deficits Skin: BLE cellulitis with anterior lower leg skin sloughing Intake and Output: 
Current Shift:  06/06 0701 - 06/06 1900 In: 20.4 [I.V.:20.4] Out: 30 [Urine:30] Last three shifts:  06/04 1901 - 06/06 0700 In: 1066 [I.V.:1066] Out: 1550 [[id number]] Lab/Data Reviewed: 
BMP:  
Lab Results Component Value Date/Time  (L) 06/06/2019 04:53 AM  
 K 3.8 06/06/2019 04:53 AM  
  06/06/2019 04:53 AM  
 CO2 26 06/06/2019 04:53 AM  
 AGAP 7 06/06/2019 04:53 AM  
  (H) 06/06/2019 04:53 AM  
 BUN 38 (H) 06/06/2019 04:53 AM  
 CREA 1.69 (H) 06/06/2019 04:53 AM  
 GFRAA 35 (L) 06/06/2019 04:53 AM  
 GFRNA 29 (L) 06/06/2019 04:53 AM  
 
CBC:  
Lab Results Component Value Date/Time WBC 17.2 (H) 06/06/2019 04:53 AM  
 HGB 9.2 (L) 06/06/2019 04:53 AM  
 HCT 28.5 (L) 06/06/2019 04:53 AM  
  06/06/2019 04:53 AM  
 
 
Imaging Reviewed: 
No results found. Echo 6/3/19 Interpretation Summary · Left Ventricle: Estimated left ventricular ejection fraction is 51 - 55%. Visually measured ejection fraction. Moderate (grade 2) left ventricular diastolic dysfunction. · Left Atrium: Mildly dilated left atrium. · Right Ventricle: Mildly dilated right ventricle. Borderline low systolic function. · Right Atrium: Mildly dilated right atrium. · Mitral Valve: Mitral valve non-specific thickening. Trace mitral valve regurgitation. · Tricuspid Valve: Moderate tricuspid valve regurgitation is present. · Pulmonary Artery: Mild to moderate pulmonary hypertension. · Pericardium: There is a large left pleural effusion. Echo Findings Left Ventricle Normal cavity size and systolic function (ejection fraction normal). Upper normal wall thickness. The estimated ejection fraction is 51 - 55%. Visually measured ejection fraction. There is moderate (grade 2) left ventricular diastolic dysfunction. Left Atrium Mildly dilated left atrium. Right Ventricle Mildly dilated right ventricle. Borderline low systolic function. Right Atrium Mildly dilated right atrium. Interatrial Septum Saline contrast not used. Aortic Valve Aortic valve not well visualized. Mitral Valve No stenosis. Mitral valve non-specific thickening. w/o reduced excursion. Trace regurgitation. Tricuspid Valve Normal valve structure and no stenosis. Moderate tricuspid valve regurgitation. Pulmonary arterial systolic pressure is 82.8 mmHg. Mild to moderate pulmonary hypertension. Pulmonic Valve Pulmonic valve normal doppler findings. Aorta Normal aortic root, ascending aortic, and aortic arch. Pulmonary Artery Pulmonary arteries not well visualized. IVC/Hepatic Veins Inferior vena cava not well visualized. Pericardium Normal pericardium and no evidence of pericardial effusion. There is a large left pleural effusion. Medications Reviewed: 
Current Facility-Administered Medications Medication Dose Route Frequency  amLODIPine (NORVASC) tablet 10 mg  10 mg Oral DAILY  piperacillin-tazobactam (ZOSYN) 3.375 g in 0.9% sodium chloride (MBP/ADV) 100 mL \"EXTENDED 4 HOUR INFUSION###\"  3.375 g IntraVENous Q8H  
 [START ON 6/7/2019] vancomycin (VANCOCIN) 1,250 mg in 0.9% sodium chloride 250 mL IVPB  1,250 mg IntraVENous Q24H  
 furosemide (LASIX) 100 mg in 0.9% sodium chloride 100 mL infusion  10 mg/hr IntraVENous CONTINUOUS  
 [START ON 6/7/2019] insulin glargine (LANTUS) injection 12 Units  12 Units SubCUTAneous DAILY  [START ON 6/7/2019] VANCOMYCIN INFORMATION NOTE   Other ONCE  hydrALAZINE (APRESOLINE) 20 mg/mL injection 20 mg  20 mg IntraVENous Q4H PRN  
 insulin lispro (HUMALOG) injection   SubCUTAneous Q6H  
 dextrose 10% infusion 125-150 mL  125-150 mL IntraVENous PRN  
 acetaminophen (TYLENOL) tablet 650 mg  650 mg Oral Q4H PRN  
 metoprolol (LOPRESSOR) injection 5 mg  5 mg IntraVENous Q6H  
 sodium chloride (NS) flush 5-10 mL  5-10 mL IntraVENous PRN  
 heparin 25,000 units in D5W 250 ml infusion  18-36 Units/kg/hr IntraVENous TITRATE  levothyroxine (SYNTHROID) tablet 112 mcg  112 mcg Oral ACB  VANCOMYCIN INFORMATION NOTE 1 Each  1 Each Other Rx Dosing/Monitoring  glucose chewable tablet 16 g  4 Tab Oral PRN  
 glucagon (GLUCAGEN) injection 1 mg  1 mg IntraMUSCular PRN  pantoprazole (PROTONIX) injection 40 mg  40 mg IntraVENous DAILY  hydrocortisone Sod Succ (PF) (SOLU-CORTEF) injection 100 mg  100 mg IntraVENous TID Lily Cook PA-C [address] [place], [place] [address] Hospitalist Division Office:  [phone] Pager: [phone]

## 2019-06-06 NOTE — PROGRESS NOTES
Problem: Sepsis: Day 3 Goal: *Oxygen saturation within defined limits Outcome: Progressing Towards Goal 
  
Problem: Falls - Risk of 
Goal: *Absence of Falls Description Document Idanha Mcgee Fall Risk and appropriate interventions in the flowsheet. Outcome: Progressing Towards Goal 
  
Problem: Pressure Injury - Risk of 
Goal: *Prevention of pressure injury Description Document Tate Scale and appropriate interventions in the flowsheet. Outcome: Progressing Towards Goal 
  
Problem: Hypertension Goal: *Blood pressure within specified parameters Outcome: Progressing Towards Goal 
Goal: *Fluid volume balance Outcome: Progressing Towards Goal 
Goal: *Labs within defined limits Outcome: Progressing Towards Goal 
  
Problem: Diabetes Self-Management Goal: *Disease process and treatment process Description Define diabetes and identify own type of diabetes; list 3 options for treating diabetes. Outcome: Progressing Towards Goal 
Goal: *Incorporating nutritional management into lifestyle Description Describe effect of type, amount and timing of food on blood glucose; list 3 methods for planning meals. Outcome: Progressing Towards Goal 
Goal: *Incorporating physical activity into lifestyle Description State effect of exercise on blood glucose levels. Outcome: Progressing Towards Goal 
Goal: *Developing strategies to promote health/change behavior Description Define the ABC's of diabetes; identify appropriate screenings, schedule and personal plan for screenings. Outcome: Progressing Towards Goal 
Goal: *Using medications safely Description State effect of diabetes medications on diabetes; name diabetes medication taking, action and side effects. Outcome: Progressing Towards Goal 
Goal: *Monitoring blood glucose, interpreting and using results Description Identify recommended blood glucose targets  and personal targets.  
Outcome: Progressing Towards Goal 
 Goal: *Prevention, detection, treatment of acute complications Description List symptoms of hyper- and hypoglycemia; describe how to treat low blood sugar and actions for lowering  high blood glucose level. Outcome: Progressing Towards Goal 
Goal: *Prevention, detection and treatment of chronic complications Description Define the natural course of diabetes and describe the relationship of blood glucose levels to long term complications of diabetes. Outcome: Progressing Towards Goal 
Goal: *Developing strategies to address psychosocial issues Description Describe feelings about living with diabetes; identify support needed and support network Outcome: Progressing Towards Goal 
Goal: *Sick day guidelines Outcome: Progressing Towards Goal 
Goal: *Patient Specific Goal (EDIT GOAL, INSERT TEXT) Outcome: Progressing Towards Goal 
  
Problem: Pain Goal: *Control of Pain Outcome: Progressing Towards Goal 
Goal: *PALLIATIVE CARE:  Alleviation of Pain Outcome: Progressing Towards Goal 
  
Problem: Tissue Perfusion - Cardiopulmonary, Altered Goal: *Optimize tissue perfusion Outcome: Progressing Towards Goal 
Goal: *Absence of hypoxia Outcome: Progressing Towards Goal 
  
Problem: Fluid Volume - Risk of, Imbalanced Goal: *Balanced intake and output Outcome: Progressing Towards Goal 
  
Problem: Patient Education: Go to Patient Education Activity Goal: Patient/Family Education Outcome: Progressing Towards Goal 
  
Problem: Discharge Planning Goal: *Discharge to safe environment Outcome: Progressing Towards Goal 
  
Problem: Gas Exchange - Impaired Goal: *Absence of hypoxia Outcome: Progressing Towards Goal 
  
Problem: Diabetes Maintenance:Admission Goal: *Blood glucose 80 to 180 mg/dl Outcome: Progressing Towards Goal 
  
Problem: Nutrition Deficit Goal: *Optimize nutritional status Outcome: Progressing Towards Goal 
  
Problem: General Wound Care Goal: *Non-infected wound: Improvement of existing wound, absence of infection, and maintenance of skin integrity Outcome: Progressing Towards Goal 
Goal: *Infected Wound: Prevention of further infection and promotion of healing Description Infection control procedures (eg: clean dressings, clean gloves, hand washing, precautions to isolate wound from contamination, sterile instruments used for wound debridement) should be implemented. Outcome: Progressing Towards Goal 
Goal: Interventions Outcome: Progressing Towards Goal 
  
Problem: Patient Education: Go to Patient Education Activity Goal: Patient/Family Education Outcome: Progressing Towards Goal

## 2019-06-06 NOTE — PROGRESS NOTES
Problem: Mobility Impaired (Adult and Pediatric) Goal: *Acute Goals and Plan of Care (Insert Text) Description Physical Therapy Goals Initiated 6/6/2019 and to be accomplished within 7 days. 1.  Patient will complete all bed mobility with moderate assistance  in order to prepare for EOB/OOB activity. 2.  Patient will tolerate sitting EOB x 10 minutes in order to safely participate with PT.  
3.  Patient will perform sit <> stand with maximal assistance in order to prepare for OOB/gait activity. 4.  Patient will perform bed to chair transfers with maximal assistance in order to promote mobility and encourage seated activity to progress towards their prior level of function. 5.  Patient will participate in 10 minutes of B LE exercise/AROM with moderate assistance . Outcome: Progressing Towards Goal 
 PHYSICAL THERAPY EVALUATION Patient: Lizy Fay (73 y.o. female) Date: 6/6/2019 Primary Diagnosis: Hypoglycemia [E16.2] PAD (peripheral artery disease) ([facility] [I73.9] Diabetes ([facility] [E11.9] Precautions:  Fall PLOF: Ambulatory with [facility] ASSESSMENT : 
Patient supine in bed, agreeable to participation with PT. Oriented to self, location only Co-treated with OT to maximize safety with mobility. Patient reports that she lives alone but her son is present to assist her. She has a cane which she uses for ambulation. Patient unable to clear LEs from bed. Able to perform minimal hip abd/add in order to assist with bed mobility. MAX A x 2 for supine > sit. Patient initially requiring constant support for balance but balance improves with cuing. Fair seated balance overall. Demo's decreased AROM  of B LEs. Gross strength deferred. Patient tolerates sitting EOB x 10 - 15 minutes. OOB deferred for safety and patient fatigue. MAX A x 2 for sit > supine. Patient left supine in bed with all needs within reach. OT at bedside at end of session. /53 at end of session. Patient educated on role of PT, PT POC, bed mobility, transfers, gait, and safety with mobility as indicated. Recommend d/c SNF Patient will benefit from skilled intervention to address the above impairments. Patient's rehabilitation potential is considered to be Fair Factors which may influence rehabilitation potential include:  
? None noted ? Mental ability/status ? Medical condition ? Home/family situation and support systems ? Safety awareness 
? Pain tolerance/management 
? Other: PLAN : 
Recommendations and Planned Interventions:  
?           Bed Mobility Training             ? Neuromuscular Re-Education ? Transfer Training                   ? Orthotic/Prosthetic Training 
? Gait Training                          ? Modalities ? Therapeutic Exercises           ? Edema Management/Control ? Therapeutic Activities            ? Family Training/Education ? Patient Education ? Other (comment): Frequency/Duration: Patient will be followed by physical therapy 3 - 5 times a week to address goals. Discharge Recommendations: Jack Ventura Further Equipment Recommendations for Discharge: TBD SUBJECTIVE:  
Patient stated ? I'm from [place]. ? OBJECTIVE DATA SUMMARY:  
 
Past Medical History:  
Diagnosis Date AC joint arthropathy   
 right Acne rosacea Bursitis of shoulder   
 rotator cuff Burst blood vessel in eye, left 02/2018  
 ruptured blood vessel left Diabetes ([address] Diabetic retinopathy ([address] severe, non-proliferative Discoid lupus Duodenal ulcer Facet joint disease   
 cervical spine, injections Fibromyalgia Hiatal hernia Hypercholesterolemia Hypertension   
 hypothyroid Impingement syndrome of shoulder   
 left Lumbar scoliosis Nonexudative age-related macular degeneration severe Open-angle glaucoma   
 severe Rectocele Past Surgical History:  
Procedure Laterality Date COLONOSCOPY  2007  
 polyps, Dr. [facility] ADMIN HEPATITIS B VACC  unk HX CARPAL TUNNEL RELEASE    
 HX CHOLECYSTECTOMY  2007 HX KNEE ARTHROSCOPY HX SHOULDER ARTHROSCOPY    
 right Barriers to Learning/Limitations: yes;  altered mental status (i.e.Sedation, Confusion) Compensate with: Visual Cues and Tactile Cues Home Situation: 
Home Situation Home Environment: Private residence # Steps to Enter: 4 One/Two Story Residence: One story Living Alone: Yes Support Systems: Child(tressa) Patient Expects to be Discharged to[de-identified] Unknown Current DME Used/Available at Home: Shower chair, Adaptive dressing aides Tub or Shower Type: Tub/Shower combination(has shower chair; no grab bars) Critical Behavior: 
Neurologic State: Eyes open to voice Orientation Level: Oriented to person Cognition: Decreased attention/concentration; Follows commands Safety/Judgement: Decreased insight into deficits Psychosocial 
Patient Behaviors: Calm; Cooperative Purposeful Interaction: Yes 
  
Strength:   
Strength: Generally decreased, functional 
 
Range Of Motion: 
AROM: Grossly decreased, non-functional(Unable to clear B heels from bed) PROM: Within functional limits(B LEs) Functional Mobility: 
Bed Mobility: 
  
Supine to Sit: Maximum assistance;Assist x2 Sit to Supine: Total assistance;Assist x2 Scooting: Total assistance Transfers: 
Deferred Balance:  
Sitting: Impaired Sitting - Static: Fair (occasional) Sitting - Dynamic: Poor (constant support) Standing: (not assessed due to fair- static sitting balance) Ambulation/Gait Training: 
Deferred Pain None Pain level pre-treatment: 0/10 Pain level post-treatment: 0/10 Pain Intervention(s) : N/A Activity Tolerance:  
Fair, limited by fatigue Please refer to the flowsheet for vital signs taken during this treatment. After treatment:  
?         Patient left in no apparent distress sitting up in chair ? Patient left in no apparent distress in bed 
? Call bell left within reach ? Nursing notified ? Caregiver present ? Bed alarm activated ? SCDs applied COMMUNICATION/EDUCATION:  
?         Role of Physical Therapy in the acute care setting. ?         Fall prevention education was provided and the patient/caregiver indicated understanding. ? Patient/family have participated as able in goal setting and plan of care. ?         Patient/family agree to work toward stated goals and plan of care. ?         Patient understands intent and goals of therapy, but is neutral about his/her participation. ? Patient is unable to participate in goal setting/plan of care: ongoing with therapy staff. ?         Other: Thank you for this referral. 
Anna Long Time Calculation: 23 mins Eval Complexity: History: MEDIUM  Complexity : 1-2 comorbidities / personal factors will impact the outcome/ POC Exam:MEDIUM Complexity : 3 Standardized tests and measures addressing body structure, function, activity limitation and / or participation in recreation  Presentation: MEDIUM Complexity : Evolving with changing characteristics  Clinical Decision Making:Medium Complexity MAX - Total A for mobility  Overall Complexity:MEDIUM

## 2019-06-06 NOTE — CDMP QUERY
Patient admitted with Sepsis, Cellulitis of Lower ext, . Per  Wound care nurse,  pt has a stage 2 pressure ulcer  of the sacrum,   noted to also have  a pressure ulcer of the right heel  staged as DTI,  please document in progress notes and D/C Summary to reflect this diagnosis. Pressure Ulcer on Sacrum ,  Stage 2 , please specify as POA or Hospital acquired Pressure  Ulcer right  Heel DTI,   , please specify as  POA of Hospital Acquired The medical record reflects the following: 
  Risk Factors:  Age, Pvd with Occlusion, Cellulitis Clinical Indicators:  Wound care nurse indicates stage 2 on sacrum and Dti on Right Heel Treatment:  Turn and reposition q 2 hours, wound care, heels elevated, Thank Zoila Cobb RN/CDI 
[phone]

## 2019-06-06 NOTE — PROGRESS NOTES
Problem: Dysphagia (Adult) Goal: *Acute Goals and Plan of Care (Insert Text) Description Recommendations: 
Diet: puree/nectar-thick (NO STRAWS) Meds: crush in applesauce Aspiration Precautions Oral Care TID Other: MBS next day if stable Goals:  Patient will: 1. Tolerate PO trials with 0 s/s overt distress in 4/5 trials 2. Utilize compensatory swallow strategies/maneuvers (decrease bite/sip, size/rate, alt. liq/sol) with min cues in 4/5 trials 3. Perform oral-motor/laryngeal exercises to increase oropharyngeal swallow function with min cues 4. Complete an objective swallow study (i.e., MBSS) to assess swallow integrity, r/o aspiration, and determine of safest LRD, min A Outcome: Progressing Towards Goal 
 
 
SPEECH LANGUAGE PATHOLOGY BEDSIDE SWALLOW  
EVALUATION & TREATMENT Patient: Kerline Garcia (21 y.o. female) Date: 6/6/2019 Primary Diagnosis: Hypoglycemia [E16.2] PAD (peripheral artery disease) ([facility] [address] [I73.9] Diabetes ([facility] [address] [E11.9] Precautions: aspiration  Fall PLOF: mod I 
 
ASSESSMENT : 
Based on the objective data described below, the patient presents with mild-moderate oropharyngeal dysphagia in the setting of general debility. Pt A&Ox2; able to follow simple 1-step directions. Oral-motor exam revealed structures grossly intact for mastication and deglutition. Pt accepted SLP-fed thin liquids via cup sip and straw; both resulting in delayed wet vocal quality and throat clears. Aspiration s/s resolved with nectar-thick liquids. No aspiration s/s noted with puree; however, it should be noted that pt exhibited significantly delayed oral bolus prep and transit across all evaluation trials. Swallow delayed consistently to palpation. Solids not attempted as pt with increased fatigue. At this time, pt safest for puree/nectar-thick liquids (no straws as pt does not have strength to pull from straw); meds should be crushed.  Pt may benefit from [address] next day to assess swallow integrity and rule-out aspiration. SLP will follow for diet tolerance and advancement as indicated. D/w RN, Saima López. TREATMENT : 
Skilled therapy initiated; Educated pt on aspiration precautions and importance of compensatory swallow techniques to decrease aspiration risk (decrease rate of intake & sip/bite size, upright @HOB for all po intake and ~30 minutes after po); requires encouragement. SLP to follow as indicated. Patient will benefit from skilled intervention to address the above impairments. Patient's rehabilitation potential is considered to be Fair Factors which may influence rehabilitation potential include: ? None noted ? Mental ability/status ? Medical condition ? Home/family situation and support systems ? Safety awareness ? Pain tolerance/management ? Other: PLAN : 
Recommendations and Planned Interventions: 
Puree/nectar-thick liquids Frequency/Duration: Patient will be followed by speech-language pathology 1-2 times per day/4-7 days per week to address goals. Discharge Recommendations: [place] SUBJECTIVE:  
Patient stated ?ok thank you? . 
 
OBJECTIVE:  
 
Past Medical History:  
Diagnosis Date AC joint arthropathy   
 right Acne rosacea Bursitis of shoulder   
 rotator cuff Burst blood vessel in eye, left 02/2018  
 ruptured blood vessel left Diabetes ([address] Diabetic retinopathy ([address] severe, non-proliferative Discoid lupus Duodenal ulcer Facet joint disease   
 cervical spine, injections Fibromyalgia Hiatal hernia Hypercholesterolemia Hypertension   
 hypothyroid Impingement syndrome of shoulder   
 left Lumbar scoliosis Nonexudative age-related macular degeneration   
 severe Open-angle glaucoma   
 severe Rectocele Past Surgical History:  
Procedure Laterality Date COLONOSCOPY  2007  
 polyps, Dr. Valladares [facility] ADMIN HEPATITIS B VACC  unk HX CARPAL TUNNEL RELEASE    
 HX CHOLECYSTECTOMY  2007 HX KNEE ARTHROSCOPY HX SHOULDER ARTHROSCOPY    
 right Home Situation:  
Home Situation Home Environment: Private residence # Steps to Enter: 4 One/Two Story Residence: One story Living Alone: Yes Support Systems: Child(tressa) Patient Expects to be Discharged to[de-identified] Unknown Current DME Used/Available at Home: Shower chair, Adaptive dressing aides Tub or Shower Type: Tub/Shower combination(has shower chair; no grab bars) Diet prior to admission: regular/thin Current Diet:  Puree/nectar-thick liquids; meds crushed Cognitive and Communication Status: 
Neurologic State: Alert, Confused Orientation Level: Oriented to person, Oriented to place Cognition: Follows commands Perception: Appears intact Perseveration: No perseveration noted Safety/Judgement: Decreased insight into deficits Oral Assessment: 
Oral Assessment Labial: No impairment Dentition: Natural 
Oral Hygiene: Fair Lingual: Decreased rate;Decreased strength Velum: No impairment Mandible: No impairment P.O. Trials: 
Patient Position: [facility] Vocal quality prior to P.O.: Breathy;Low volume Consistency Presented: Thin liquid;Pudding;Nectar thick liquid How Presented: SLP-fed/presented;Cup/sip;Straw Bolus Acceptance: No impairment Bolus Formation/Control: Impaired Type of Impairment: Delayed;Mastication Propulsion: Delayed (# of seconds) Oral Residue: 10-50% of bolus; Lingual 
Initiation of Swallow: Delayed (# of seconds) Laryngeal Elevation: Decreased Aspiration Signs/Symptoms: None Pharyngeal Phase Characteristics: Poor endurance Effective Modifications: Small sips and bites;Cup/sip Cues for Modifications: Moderate Oral Phase Severity: Mild-moderate Pharyngeal Phase Severity : Mild-moderate PAIN: 
Pain level pre-treatment: 0/10 Pain level post-treatment: 0/10 After treatment:  
?            Patient left in no apparent distress sitting up in chair ? Patient left in no apparent distress in bed ? Call bell left within reach ? Nursing notified ? Family present ? Caregiver present ? Bed alarm activated COMMUNICATION/EDUCATION:  
?            Aspiration precautions; swallow safety; compensatory techniques. ?            Patient/family have participated as able in goal setting and plan of care. ?            Patient/family agree to work toward stated goals and plan of care. ?            Patient understands intent and goals of therapy; neutral about participation. ? Patient unable to participate in goal setting/plan of care; educ ongoing with interdisciplinary staff ? Posted safety precautions in patient's room. Thank you for this referral. 
RENEE Richards Time Calculation: 28 mins Evaluation Time: 18 minutes Treatment Time: 10 minutes

## 2019-06-06 NOTE — PROGRESS NOTES
Attempted to obtain ABG x3 attempts, without success. Notified MD Kierra Jackson. Per MD, patient taken off BIPAP and placed on 3 lpm NC O2. Current vitals: HR 61, SpO2 99, RR 24, /53, BS diminished. BIPAP on standby and suction setup at bedside.

## 2019-06-06 NOTE — PROGRESS NOTES
[facility] ICU Progress Note Name: Kerline Garcia : 1940 MRN: [id number] Date: 2019 2:07 PM 
  
[x]I have reviewed the flowsheet and previous days notes. Events overnight reviewed and discussed with nursing staff. Vital signs and records reviewed. Subjective: 
19: 
 
Did not tolerate transition to N/c yesterday (shallow breathing noted) Continues on BiPAP Attempt to transition to N/c again today & encourage pulmonary toilet (vest therapy q-4 hours) & OOB as hayden PT/OT Consulted yesterday, but have not see patient yet D5-LR gtt D/c'd yesterday Tolerating Lopressor IVP however still requiring Hydralazine, IVP PRN. Add Norvasc 10 mg PO Daily today. SBP goal < 170 WBC rising (17 K this AM) Repeat BC's obtained this AM  
 
Cr rising daily despite Bumex IVP BID. Will add 25% albumin x 1 now.  +/- Renal consult []The patient is unable to give any meaningful history or review of systems because the patient is: 
[]Intubated []Sedated  
[]Unresponsive [x]The patient is critically ill on     
[]Mechanical ventilation []Pressors  
[x]BiPAP [] ROS:A comprehensive review of systems was negative except for that written in the HPI. Medication Review: · Pressors - None · Sedation - None · Antibiotics - Zosyn, Vanco 
· Pain - APAP, PRN 
· GI/ DVT - Protonix IVP / Heparin gtt Vital Signs:   
Visit Vitals /56 Pulse 67 Temp 98.5 °F (36.9 °C) Resp 18 Ht 5' 5\" (1.651 m) Wt 114.5 kg (252 lb 6.8 oz) SpO2 99% BMI 42.01 kg/m² O2 Device: BIPAP Temp (24hrs), Av.1 °F (36.7 °C), Min:97.8 °F (36.6 °C), Max:98.6 °F (37 °C) Intake/Output:  
Last shift:      No intake/output data recorded. Last 3 shifts:  1901 -  0700 In: 1066 [I.V.:1066] Out: 1550 [HNYZA:2149] Intake/Output Summary (Last 24 hours) at 2019 9656 Last data filed at 2019 0700 Gross per 24 hour Intake 458.08 ml Output 1060 ml Net -601.92 ml Physical Exam: 
 
General: Lethargic. [place] x 1   No C/o's. HEENT:  Anicteric sclerae; pink palpebral conjunctivae; mucosa moist 
Resp:  Symmetrical chest rise, no accessory muscle use. BS = B/L Clear, decreased throughout (> bases). No rales/ wheezing/ rhonchi noted CV:  Afib, rate controlled. No m/g/r 
GI:  Abdomen soft, non-tender; (+) active bowel sounds Extremities:  +2 pulses on B/L UE's.  +2 pulses on B/L Groins. Absent Pulses on B/L LE's (DP/PT). B/L LE's erythematous. +2 B/L LE's edema Skin:  Warm; + B/L LE's erythema. Neurologic:  Lethargic on exam.  Will arouse with verbal stimulation. [place] x 1. BURGESS to commands. Devices:  No NGT/OGT/ ETTube.  + Central line & Jackson. DATA:  
 
Current Facility-Administered Medications Medication Dose Route Frequency  hydrALAZINE (APRESOLINE) 20 mg/mL injection 20 mg  20 mg IntraVENous Q4H PRN  
 vancomycin (VANCOCIN) 1,250 mg in 0.9% sodium chloride 250 mL IVPB  1,250 mg IntraVENous Q24H  
 [START ON 6/8/2019] VANCOMYCIN INFORMATION NOTE   Other ONCE  
 insulin glargine (LANTUS) injection 10 Units  10 Units SubCUTAneous DAILY  insulin lispro (HUMALOG) injection   SubCUTAneous Q6H  
 dextrose 10% infusion 125-150 mL  125-150 mL IntraVENous PRN  
 acetaminophen (TYLENOL) tablet 650 mg  650 mg Oral Q4H PRN  
 metoprolol (LOPRESSOR) injection 5 mg  5 mg IntraVENous Q6H  
 bumetanide (BUMEX) injection 1 mg  1 mg IntraVENous BID  sodium chloride (NS) flush 5-10 mL  5-10 mL IntraVENous PRN  
 heparin 25,000 units in D5W 250 ml infusion  18-36 Units/kg/hr IntraVENous TITRATE  levothyroxine (SYNTHROID) tablet 112 mcg  112 mcg Oral ACB  piperacillin-tazobactam (ZOSYN) 3.375 g in 0.9% sodium chloride (MBP/ADV) 100 mL MBP  3.375 g IntraVENous Q8H  
 VANCOMYCIN INFORMATION NOTE 1 Each  1 Each Other Rx Dosing/Monitoring  glucose chewable tablet 16 g  4 Tab Oral PRN  
  glucagon (GLUCAGEN) injection 1 mg  1 mg IntraMUSCular PRN  pantoprazole (PROTONIX) injection 40 mg  40 mg IntraVENous DAILY  hydrocortisone Sod Succ (PF) (SOLU-CORTEF) injection 100 mg  100 mg IntraVENous TID Labs: Results:  
   
Chemistry Recent Labs  
  06/06/19 
0453 06/05/19 
0210 06/04/19 
0500 06/03/19 
1620 * 256* 154* 71* * 133* 131* 133* K 3.8 4.0 4.0 4.2  101 100 103 CO2 26 20* 24 23 BUN 38* 33* 28* 29* CREA 1.69* 1.55* 1.45* 1.43* CA 8.4* 8.4* 8.1* 8.2* AGAP 7 12 7 7 BUCR 22* 21* 19 20 AP  --   --   --  69  
TP  --   --   --  6.2* ALB  --   --   --  2.7*  
GLOB  --   --   --  3.5 AGRAT  --   --   --  0.8 CBC w/Diff Recent Labs  
  06/06/19 
0453 06/05/19 
0210 06/04/19 
0500 WBC 17.2* 13.9* 11.2 RBC 3.34* 3.65* 3.58* HGB 9.2* 10.0* 9.9*  
HCT 28.5* 31.2* 31.1*  
 183 175 GRANS 91* 91* 92* LYMPH 5* 4* 5* EOS 0 0 0 Coagulation Recent Labs  
  06/06/19 0453 06/05/19 
1845 APTT 45.5* >180.0* Liver Enzymes Recent Labs  
  06/03/19 
1620 TP 6.2* ALB 2.7* AP 69 SGOT 82* ABG No results found for: PH, PHI, PCO2, PCO2I, PO2, PO2I, HCO3, HCO3I, FIO2, FIO2I Microbiology No results for input(s): CULT in the last 72 hours. Telemetry: []Sinus []A-flutter []Paced [x]A-fib []Multiple PVCs Imaging: 
None recently IMPRESSION:  
Altered mental status Hypercapnia still requiring BiPAP Gram-positive cocci bacteremia with identification pending Bilateral lower extremity cellulitis? Bilateral lower extremity peripheral arterial disease Lower extremity ischemia likely chronic Diffuse edema and volume overload Persistent hypoglycemia likely reflecting inadvertent glipizide overdose Hypervolemia hyponatremia still warranting diuresis Anemia of unclear etiology. Likely component of volume resuscitation Generalized weakness RECOMMENDATIONS:  
 · Resp:  No recent PCXR. ABG ordered this AM (pending). Continues on BiPAP. Failed transition to N/c yesterday (shallow breathing pattern noted). 02 sat's high 90's. Try to transition to N/c again today & encourage pulmonary toilet (Vest Therapy q-4 hours) & OOB as hayden. Titrate FiO2/ supp O2 for SpO2 >90% · I/D: Afebrile. WBC jump this AM (17.2 K). +UTI via U/a (6/3/2019). + BCx (6/3 = contaminated), repeat BCx obtained this AM.  Continue ABX (Vanco / Zosyn) until final Cx's resulted · Hem/Onc: Hct / PLT's stable (28.5 & 219 K). PTT 45.5 (0500). Daily CBC. PTT checks per Heparin gtt protocol · CVS: Stable. No gtt's. Tele = Afib, rate controlled. Goal SBP < 170   Continue Lopressor 5 mg IVP q-6 hours. Add Norvasc 10 mg PO Daily. Continue Hydralazine IVP PRN  
· Metabolic: Daily BMP; monitor e-lytes; replace PRN 
· Renal: I > O's despite Diuretics (Bumex 1 mg IVP BID). Cr rising daily (1.69 this AM). Avoid NSAIDS. Na stable (135). Will give 25% albumin x 1 now. ?? Renal consult. Trend Renal indices; Diuresis, Jackson to BSD · Endocrine: POC Glucose q6; Continue Synthroid for Thyroid d/o 
· GI: NPO except meds. Low threshold to place NG tube & begin TF's, will discuss on IDR's today. Zofran PRN for N/V  
· Musc/Skin: Wound Care following for B/L LE's Mgmt · Neuro: Lethargic but will arouse with stimulation. [place] x 1. BURGESS to commands. NAD. No C/o's. · Vascular Surgery following. Continues on Heparin gtt. No new imaging done & no intervention planned. · Fluids: None 
· PT/OT Consulted yesterday, have not seen patient yet · Solucortef (100 mg IVP TID) to end this afternoon · To Be Discussed in Interdisciplinary Rounds (Continue ICU Care to monitor respiratory & mental status) Best Practices/ Safety Bundles: 
· Sepsis Bundle per Hospital Protocol · CAUTI Bundle · Electrolyte Replacement Bundle · Glycemic control goal <180; avoid Hypoglycemia · IHI ICU Bundles: ·  Central Line Bundle Followed  and Lozoya Bundle Followed · Mech Vent patients: · VAP bundle, Aim to keep peak plateau pressure [id number] H2O 
· Aspiration Precautions - HOB >30' · Daily sedation holiday as indicated · SBT as tolerated/appropriate · Titrate FiO2 for SpO2 >94% · Aggressive Pulmonary Hygeiene · Stress ulcer prophylaxis. Protonix IVP · DVT prophylaxis. Heparin gtt · Need for Lines, lozoya assessed. · Restraints need. · Palliative care evaluation. The patient is: [x] acutely ill Risk of deterioration: [] moderate  
 [] critically ill  [x] high  
 
[x]See my orders for details My assessment/plan was discussed with: 
[x]Nursing []PT/OT [x]Respiratory therapy []  
[]Family [] Critical Care time = 35 Minutes Cite Ross Gutierrez 
06/06/19 Pulmonary, Critical Care Medicine [facility]

## 2019-06-06 NOTE — PROGRESS NOTES
Problem: Gas Exchange - Impaired Goal: *Absence of hypoxia Outcome: Progressing Towards Goal 
 Respiratory Therapy Assessment Care Plan Patient: 
Milly Garay [de-identified] y.o. female 6/6/2019 2:57 PM 
 
Hypoglycemia [E16.2] PAD (peripheral artery disease) ([facility] [address] [I73.9] Diabetes ([facility] [address] [E11.9] Chest X-RAY:  
Results from Hospital Encounter encounter on 06/03/19 XR CHEST PORT Addendum Addendum:    Note: The final attending interpretation is without significant  discrepancy relative to preliminary resident report. Marvin Bui MD 6/4/2019  9:15 AM        
 Impression IMPRESSION: 
 
New right internal jugular line projects in standard position. No conspicuous 
pneumothorax. Unchanged left basilar lung opacity -- atelectasis, aspiration, infection, or a 
combination, with possible left basilar effusion. _______________ XR PELV 1 OR 2 V Impression IMPRESSION:  Rotated projection of the pelvis with degenerative changes as 
described. No superimposed acute radiographic abnormality. XR FEMUR RT 2 VS  
 Impression IMPRESSION: 
 
Degenerative changes involving the right femur as described without radiographic 
evidence of acute abnormality. Vital Signs:   Visit Vitals /43 Pulse 71 Temp 98.5 °F (36.9 °C) Resp 18 Ht 5' 5\" (1.651 m) Wt 114.5 kg (252 lb 6.8 oz) SpO2 99% BMI 42.01 kg/m² Indications for treatment: Hypercapnia and CHF, per CXR, left basilar lung opacity (atelectasis/aspiration/infection/pleural effusion) Plan of care: BIPAP PRN, O2 therapy, Vest therapy Q4 Goal: Oxygenation, airway clearance

## 2019-06-06 NOTE — PROGRESS NOTES
Nursing assessment complete. Opens eyes to voice commands. Follows commands. bipap in place. Jackson in place. PIV right neck and right hand. +2-3 piting edema to BUE and BLE. Excoriation under bilateral breast. Large bruise right thigh. Gauze dressing bilateral legs. Red toes. Mepilex to sacrum. VSS 
0500 labs drawn Bedside shift change report given to kateryna (oncoming nurse) by Dar Love RN 
 (offgoing nurse). Report included the following information SBAR, MAR and Recent Results.

## 2019-06-06 NOTE — PROGRESS NOTES
Discharge/Transition Planning 
  
Problem: Discharge Planning Goal: *Discharge to safe environment Outcome: Progressing Towards Goal 
 
SNF Patient has accepting facilities [facility] and [facility] Spoke with patient in room and she stated that she was not sure she wanted to go to [facility], but to contact her son [facility] Staff. Call son and he stated that patient had been to [facility] before. He stated that [facility] was close to their home and it would be the 1st choice. 2nd choice is [facility] and [facility] and [facility] 3rd. Son stated that he will tour facility today and made [facility]/ admission coordinator aware they are their 1st choice. Son stated he will call back with confirmed decision.  Complete Acceptance will be based on auth from the [facility] insurance.

## 2019-06-06 NOTE — PROGRESS NOTES
Problem: Self Care Deficits Care Plan (Adult) Goal: *Acute Goals and Plan of Care (Insert Text) Description Occupational Therapy Goals Initiated 6/6/2019 within 7 day(s). 1.  Patient will perform grooming tasks at EOB with moderate assistance and minimal verbal cues for attention to task. 2.  Patient will perform upper body dressing with moderate assistance and fair dynamic sitting balance. 3.  Patient will perform functional task at EOB for 8 minutes with minimal assistance for balance and < 5 rest breaks to increase activity tolerance for ADLs. 4.  Patient will perform toilet transfers with maximum assistance. 5.  Patient will perform all aspects of toileting with maximum assistance. Samaria Riser 6.  Patient will participate in upper extremity therapeutic exercise/activities for 8 minutes with minimal assistance to increase BUE strength/AROM for functional transfers and ADLs. 7.  Patient will utilize energy conservation techniques during functional activities with minimal verbal cues. 6/6/2019 1226 by Rancho Foster OT Outcome: Progressing Towards Goal 
 
OCCUPATIONAL THERAPY EVALUATION Patient: Luanne Tobin (89 y.o. female) Date: 6/6/2019 Primary Diagnosis: Hypoglycemia [E16.2] PAD (peripheral artery disease) ([facility] [address] [I73.9] Diabetes ([facility] [address] [E11.9] Precautions:  Fall PLOF: Pt reports independence with ADLs. ASSESSMENT : 
Based on the objective data described below, the patient presents with impairments with regard to bed mobility, activity tolerance, BUE function and independence in aDLs secondary to hypoglycemia and prolonged bedrest. Pt supine on arrival with PT/rehab tech at bedside. /57. Max A x2-3  for supine-->sit. Fair-/Poor sitting balance ~10 mins with constant support > 80% of the session. Significantly decreased BUE strength/AROM; significant edema noted. Max A for PROM of BUEs to promote AROM and fluid movement for increased participation in ADLs.  Max/total A for oral care & facial hygiene. Total A to return supine, partial chair position, needs within reach. Recommend SNF upon d/c. /55. Patient will benefit from skilled intervention to address the above impairments. Patient's rehabilitation potential is considered to be Fair Factors which may influence rehabilitation potential include: ? None noted ? Mental ability/status ? Medical condition ? Home/family situation and support systems ? Safety awareness ? Pain tolerance/management ? Other: PLAN : 
Recommendations and Planned Interventions:  
?               Self Care Training                  ? Therapeutic Activities ? Functional Mobility Training   ? Cognitive Retraining 
? Therapeutic Exercises           ? Endurance Activities ? Balance Training                    ? Neuromuscular Re-Education ? Visual/Perceptual Training     ? Home Safety Training 
? Patient Education                   ? Family Training/Education ? Other (comment): Frequency/Duration: Patient will be followed by occupational therapy 3-5 times a week to address goals. Discharge Recommendations: Jack Ventura Further Equipment Recommendations for Discharge: bedside commode SUBJECTIVE:  
Patient stated ? I'm getting tired. ? OBJECTIVE DATA SUMMARY:  
 
Past Medical History:  
Diagnosis Date AC joint arthropathy   
 right Acne rosacea Bursitis of shoulder   
 rotator cuff Burst blood vessel in eye, left 02/2018  
 ruptured blood vessel left Diabetes ([address] Diabetic retinopathy ([address] severe, non-proliferative Discoid lupus Duodenal ulcer Facet joint disease   
 cervical spine, injections Fibromyalgia Hiatal hernia Hypercholesterolemia Hypertension   
 hypothyroid Impingement syndrome of shoulder   
 left Lumbar scoliosis Nonexudative age-related macular degeneration   
 severe Open-angle glaucoma   
 severe Rectocele Past Surgical History:  
Procedure Laterality Date COLONOSCOPY  2007  
 polyps, Dr. Nimisha Banks [facility] ADMIN HEPATITIS B VACC  unk HX CARPAL TUNNEL RELEASE    
 HX CHOLECYSTECTOMY  2007 HX KNEE ARTHROSCOPY HX SHOULDER ARTHROSCOPY    
 right Barriers to Learning/Limitations: None Compensate with: visual, verbal, tactile, kinesthetic cues/model Home Situation:  
Home Situation Home Environment: Private residence One/Two Story Residence: One story Living Alone: Yes Support Systems: Child(tressa) Patient Expects to be Discharged to[de-identified] Unknown Current DME Used/Available at Home: Shower chair, Adaptive dressing aides Tub or Shower Type: Tub/Shower combination(has shower chair; no grab bars) ? Right hand dominant   ? Left hand dominant Cognitive/Behavioral Status: 
Neurologic State: Eyes open to voice Orientation Level: Oriented to person Cognition: Decreased attention/concentration; Follows commands Safety/Judgement: Decreased insight into deficits Skin: Intact (BUEs) Edema: Significant swelling to BUEs Vision/Perceptual:   
Acuity: Within Defined Limits Coordination: BUE Coordination: Generally decreased, functional(decrease 2/2 edema & weakness) Fine Motor Skills-Upper: Right Impaired;Left Impaired(2/2 edema) Gross Motor Skills-Upper: Right Intact; Left Intact(generally decreased) Balance: 
Sitting: Impaired; With support Sitting - Static: Fair (occasional)(Fair-) Sitting - Dynamic: Poor (constant support) Standing: (not assessed due to fair- static sitting balance) Strength: BUE Strength: Generally decreased, functional(2/5 deltoid; 3/5 bicep/tricep) Range of Motion: BUE 
AROM: Generally decreased, functional(~1/4 shoulder flex; 3/4 elbow flex/ext) PROM: Within functional limits Functional Mobility and Transfers for ADLs: 
Bed Mobility: 
Supine to Sit: Maximum assistance; Total assistance;Assist x2 Sit to Supine: Maximum assistance; Total assistance;Assist x2 Transfers: 
Sit to Stand: (not assessed) ADL Assessment:  
Feeding: Maximum assistance Oral Facial Hygiene/Grooming: Maximum assistance; Total assistance Bathing: Total assistance Upper Body Dressing: Maximum assistance Lower Body Dressing: Total assistance Toileting: Total assistance(lozoya catheter) ADL Intervention: 
Max/total A for facial hygiene at EOB; max/total A for oral care (with swab) Cognitive Retraining Safety/Judgement: Decreased insight into deficits Therapeutic Exercise: PROM of BUEs with max A; x10 shoulder flexion and bicep flexion/tricep extension. Active flexion/extension of bilateral hands to increase AROM and promote fluid movement in prep for ADLs. Pain: 
Pain level pre-treatment: 0/10 Pain level post-treatment: 0/10 Activity Tolerance:  Poor+ Please refer to the flowsheet for vital signs taken during this treatment. After treatment:  
? Patient left in no apparent distress sitting up in chair ? Patient left in no apparent distress in bed 
? Call bell left within reach ? Nursing notified ? Caregiver present ? Bed alarm activated COMMUNICATION/EDUCATION:  
? Role of Occupational Therapy in the acute care setting 
? Home safety education was provided and the patient/caregiver indicated understanding. ? Patient/family have participated as able in goal setting and plan of care. ? Patient/family agree to work toward stated goals and plan of care. ? Patient understands intent and goals of therapy, but is neutral about his/her participation. ? Patient is unable to participate in goal setting and plan of care. Thank you for this referral. 
Jacek Arizmendi MS OTR/L Time Calculation: 30 mins Eval Complexity: History: HIGH Complexity : Extensive review of history including physical, cognitive and psychosocial history ; Examination: HIGH Complexity : 5 or more performance deficits relating to physical, cognitive , or psychosocial skils that result in activity limitations and / or participation restrictions; Decision Making:HIGH Complexity : Patient presents with comorbidities that affect occupational performance. Signifigant modification of tasks or assistance (eg, physical or verbal) with assessment (s) is necessary to enable patient to complete evaluation

## 2019-06-06 NOTE — PROGRESS NOTES
0700: Received report from Nuday Games. Assumed care of patient in bed in low locked position with call bell and phone in reach. 1900: Bedside and Verbal shift change report given to Mary CALDERON  (oncoming nurse) by Merrick Washington RN (offgoing nurse). Report included the following information Intake/Output, MAR, Cardiac Rhythm afib with BBB and Alarm Parameters .

## 2019-06-07 NOTE — DIABETES MGMT
NUTRITIONAL Re- ASSESSMENT GLYCEMIC CONTROL/ PLAN OF CARE Nara Castro           [de-identified] y.o.           6/3/2019 1. Acute metabolic encephalopathy due to hypoglycemia 2. Hypothermia, initial encounter 3. Acute congestive heart failure, unspecified heart failure type ([address] 4. Acute respiratory failure with hypercapnia (HCC) 5. Arterial occlusion, lower extremity (HCC)   
DM, cellulitis INTERVENTIONS/PLAN:  
Recommend full strength Osmolite 1.2 calorie TF at 10 ml/hour. RN requested just trickle feed for today due to bipap tonight. Advance as tolerated to goal rate of 55ml/hour. Tube feeding will provide 1584 calories,  73g protein/d with 1.1 liters free water/d from TF. ASSESSMENT:  
Nutritional Status: 
Pt is overweight related to excess caloric intake as evidenced by 195% ideal weight and BMI= 40.5kg/m2. Pt meets criteria for morbid obesity. Diagnosis-Intake: Inadequate protein-energy intake related to respiratory distress AEB inability to take in oral nourishment , did not pass SLP evaluation. Diabetes Management:  
Recent blood glucose:    
 
Lab Results Component Value Date/Time  (H) 06/07/2019 04:30 AM  
 GLUCPOC 135 (H) 06/07/2019 11:24 AM  
 GLUCPOC 143 (H) 06/07/2019 08:07 AM  
 GLUCPOC 143 (H) 06/06/2019 10:10 PM  
 
Within target range (non-ICU: <140; ICU<180): [] Yes   [x]  No 
Current Insulin regimen: 12 units Lantus plus corrective lispro Home medication/insulin regimen:  
Key Antihyperglycemic Medications   
    
  
 glipiZIDE SR (GLUCOTROL XL) 5 mg CR tablet TAKE TWO TABLETS BY MOUTH EVERY MORNING AND TAKE ONE TABLET BY MOUTH EVERY EVENING WITH MEALS HbA1c:5.2 % Adequate glycemic control PTA:  [x] Yes  [] No 
  
SUBJECTIVE/OBJECTIVE: Information obtained from:ICU rounds Skin documentation reviewed. Diet: npo Medications: [x]                Reviewed Labs:  
Lab Results Component Value Date/Time Hemoglobin A1c 5.2 06/04/2019 05:00 AM  
not current Lab Results Component Value Date/Time Sodium 138 06/07/2019 04:30 AM  
 Potassium 3.0 (L) 06/07/2019 04:30 AM  
 Chloride 102 06/07/2019 04:30 AM  
 CO2 24 06/07/2019 04:30 AM  
 Anion gap 12 06/07/2019 04:30 AM  
 Glucose 131 (H) 06/07/2019 04:30 AM  
 BUN 41 (H) 06/07/2019 04:30 AM  
 Creatinine 1.81 (H) 06/07/2019 04:30 AM  
 Calcium 8.4 (L) 06/07/2019 04:30 AM  
 Albumin 2.7 (L) 06/03/2019 04:20 PM  
 
 
Anthropometrics: IBW : 56.7 kg (125 lb), % IBW (Calculated): 194.71 %, BMI (calculated): 41.6 Wt Readings from Last 1 Encounters:  
06/07/19 113.4 kg (250 lb) Ht Readings from Last 1 Encounters:  
06/04/19 5' 5\" (1.651 m) Estimated Nutrition Needs:  1550 Kcals/day, Protein (g): 75 g Fluid (ml): 1800 ml Based on:   []          Actual BW    [x]          IBW   []            Adjusted BW   
 
Nutrition Diagnoses:  
   Pt is overweight related to excess caloric intake as evidenced by 194% ideal weight and BMI=40.5 kg/m2. Pt meets criteria for morbid  Obesity. A1c=5.2% equivalent  to ave Blood Glucose of 96 mg/dl for 2-3 months prior to admission, indicating pt may need a reduction of her glipizide at discharge. Nutrition Interventions: Tube Feeding  Recommendations Goal:  
Intake will meet 75% of energy and protein requirements by 6/12. Blood glucose will be within target range of  mg/dL by 6/10/19. Gradual weight loss post discharge 1 lb per week by 6/17. Nutrition Monitoring and Evaluation []     Monitor po intake on meal rounds 
[x]     Continue inpatient monitoring and intervention 
[]     Other: 
 
 
Nutrition Risk:  [x]   High     []  Moderate    []  Minimal/Uncompromised Cirilo Wilcox RD 
pgr [phone]

## 2019-06-07 NOTE — PROGRESS NOTES
Problem: Falls - Risk of 
Goal: *Absence of Falls Description Document Newton Lee Fall Risk and appropriate interventions in the flowsheet.  
Outcome: Progressing Towards Goal

## 2019-06-07 NOTE — PROGRESS NOTES
0700: Bedside report received from [facility]. Heparin gtt verified, VSS. Care assumed. Will monitor. 0820: Pt c/o pain in her back. Ultram administered. Will reassess. 1000: IDRs at bedside. Nephrology consult ordered. 1145: Pt off unit for MBS. 
 
1245: Returned to room 2604 from radiology. 1520: Nephrology at bedside. Lasix gtt D/C'd. 
 
1600: Tl JEFFRIES notified of MBS results. Order for NGT received. 1745: NGT in place, right nare, 70 cm, taped. Placement verified by Dr Addis Jesus and Sohail JEFFRIES.  
 
1800: Radiology called and stated NGT was kinked in esophagus. NGT replaced and KUB ordered. 1830: Tl JEFFRIES okay'd use of NGT. NGT in left nare at 70, Osmo 1.2 started at 10.  
 
1930: Bedside shift change report given to [address] [place] [address] (oncoming nurse) by Louann Osorio RN 
 (offgoing nurse). Report included the following information SBAR, Kardex, Intake/Output, MAR, Recent Results and Cardiac Rhythm Afib.

## 2019-06-07 NOTE — PROGRESS NOTES
[facility] Pulmonary Specialist   
 
PM Check HD: Stable. Tele = rate-controlled Afib. Pulm: Tolerating N/c with 02 sat's high 90's. Tolerating Vest Rx q-4 hours. Continue BiPAP, qHS. Renal following. No need for HD. Most likely intra-vascularly dry. Will D/c Lasix gtt & monitor auto-diuresis for now GI: MBS today (failed). NPO. Will place NG tube & begin TF's for nutrition Continue Heparin gtt for now. Will re-eval tomorrow regarding transitioning to PO A/C 
 
PT/OT following Critical Care Time = 30 minutes Cite Ross Gutierrez 
06/07/19 Pulmonary, Critical Care Medicine [facility] Specialists

## 2019-06-07 NOTE — PROGRESS NOTES
Problem: Falls - Risk of 
Goal: *Absence of Falls Description Document Yomi Edward Fall Risk and appropriate interventions in the flowsheet. Outcome: Progressing Towards Goal 
  
Problem: Hypertension Goal: *Blood pressure within specified parameters Outcome: Progressing Towards Goal 
  
Problem: Diabetes Self-Management Goal: *Disease process and treatment process Description Define diabetes and identify own type of diabetes; list 3 options for treating diabetes. Outcome: Progressing Towards Goal 
  
Problem: Pain Goal: *Control of Pain Outcome: Progressing Towards Goal

## 2019-06-07 NOTE — PROGRESS NOTES
Patient is unable to communicate at this time as she is in a serious condition. Patient now resting. No family present at this visit.  offered prayer and left Spiritual Care brochure. Chaplains will continue to follow and will provide pastoral care on an as needed/requested basis. Chinmay 3 Board Certified [employer] Spiritual Care  
[phone]

## 2019-06-07 NOTE — CONSULTS
Consult Note Assessment: · EMMETT. Etio- bl le cellulitis/sepsis. · Diabetic kidney disease/proteinuria. · Hypokalemia. · HFpEF/volume overload. Improved with diuresis. · Hypoxic resp failure. · BL le cellulitis/sepsis in a setting of pad/chronic venous insufficiency. · HTN. · UTI? · Hypoglycemia with altered ms on admission. Recommendations:  
· Hold lasix drip (especially given npo status). · Agree with K replacement. · Agree with empiric abx. · Continue current antihtn. · Quantify proteinuria. · Avoid NSAID's, IV dye. · Avoid fleets enemas due to concern for acute phosphate nephropathy. · Please dose all medications for approximate creatinine clearance  30-15. Thank you. Consult requested by: Gerson Garcia MD 
 
ADMIT DATE: 6/3/2019  CONSULT DATE: June 7, 2019 Admission diagnosis: Sepsis ([address] Reason for Nephrology Consultation: emmett. HPI: Dank Mary is a [de-identified] y.o. female [facility] with h/o of dm, htn, HFpEF and chronic le edema and cellulitis who was brought to ED after son found her unresponsive at home. Patient doesn't remember details. Patient was found to be hypoglycemic, hypothermic, in resp distress. She was started on bipap, abx, was admitted to ICU. At this time resp status improved with diuresis, she is off bipap. Found to have le pad. BC are pos for staph epi. Patient failed swallowing donis, npo. No prior h/o of kidney disease except proteinuria. On admission scr was 1.4. It is gradually up to 1.81 today. Past Medical History:  
Diagnosis Date  AC joint arthropathy   
 right  Acne rosacea  Bursitis of shoulder   
 rotator cuff  Burst blood vessel in eye, left 02/2018  
 ruptured blood vessel left  Diabetes ([address]  Diabetic retinopathy ([address] severe, non-proliferative  Discoid lupus  Duodenal ulcer  Facet joint disease   
 cervical spine, injections  Fibromyalgia  Hiatal hernia  Hypercholesterolemia  Hypertension  hypothyroid  Impingement syndrome of shoulder   
 left  Lumbar scoliosis  Nonexudative age-related macular degeneration   
 severe  Open-angle glaucoma   
 severe  Rectocele Past Surgical History:  
Procedure Laterality Date  COLONOSCOPY  2007  
 polyps, Dr. Kenneth Anderson  HC ADMIN HEPATITIS B VACC  unk  HX CARPAL TUNNEL RELEASE  HX CHOLECYSTECTOMY  2007  HX KNEE ARTHROSCOPY    
 HX SHOULDER ARTHROSCOPY    
 right Social History Socioeconomic History  Marital status:  Spouse name: Not on file  Number of children: Not on file  Years of education: Not on file  Highest education level: Not on file Occupational History  Not on file Social Needs  Financial resource strain: Not on file  Food insecurity:  
  Worry: Not on file Inability: Not on file  Transportation needs:  
  Medical: Not on file Non-medical: Not on file Tobacco Use  Smoking status: Never Smoker  Smokeless tobacco: Never Used Substance and Sexual Activity  Alcohol use: Yes Comment: Rarely  Drug use: No  
 Sexual activity: Never Lifestyle  Physical activity:  
  Days per week: Not on file Minutes per session: Not on file  Stress: Not on file Relationships  Social connections:  
  Talks on phone: Not on file Gets together: Not on file Attends [employer] service: Not on file Active member of club or organization: Not on file Attends meetings of clubs or organizations: Not on file Relationship status: Not on file  Intimate partner violence:  
  Fear of current or ex partner: Not on file Emotionally abused: Not on file Physically abused: Not on file Forced sexual activity: Not on file Other Topics Concern  Not on file Social History Narrative  Not on file Family History Problem Relation Age of Onset  No Known Problems Mother  No Known Problems Father Allergies Allergen Reactions  Latex Rash  Other Plant, Animal, Environmental Other (comments) Surgical Steel, Related to ankle fracture and it caused bursitis and was removed. ELASTIC  Acetaminophen Other (comments) Abdominal pain  Adhesive Hives Pt also states she has burning and stinging  Morphine Other (comments) Agitated, nasty  Statins-Hmg-Coa Reductase Inhibitors Other (comments) Leg cramps  Sulfa (Sulfonamide Antibiotics) Rash  Tramadol Nausea Only Home Medications:  
 
Medications Prior to Admission Medication Sig  furosemide (LASIX) 20 mg tablet Take one tablet by mouth daily as needed for swelling  Indications: visible water retention  colesevelam (WELCHOL) 625 mg tablet TAKE THREE TABLETS BY MOUTH TWICE A DAY WITH MEALS INDICATIONS HETEROZYGOUS FAMILIAL HYPERCHOLESTEROLEMIA  glipiZIDE SR (GLUCOTROL XL) 5 mg CR tablet TAKE TWO TABLETS BY MOUTH EVERY MORNING AND TAKE ONE TABLET BY MOUTH EVERY EVENING WITH MEALS  omeprazole (PRILOSEC) 20 mg capsule Take 1 Cap by mouth daily.  metoprolol succinate (TOPROL-XL) 50 mg XL tablet Take 1 Tab by mouth daily.  levothyroxine (SYNTHROID) 112 mcg tablet Take 1 Tab by mouth Daily (before breakfast).  ammonium lactate (AMLACTIN) 12 % topical cream Apply  to affected area two (2) times a day. rub in to affected area well  ESTRACE 0.01 % (0.1 mg/gram) vaginal cream INSERT VAGINALLY 2 GRAMS DAILY  ORACEA 40 mg capsule daily.  MARYLOU ASPIRIN PO Take  by mouth daily. Indications: 1-2 tabs daily  Ibuprofen-diphenhydrAMINE (ADVIL PM) 200-38 mg tab Take 2 Tabs by mouth nightly.  etodolac (LODINE) 400 mg tablet TAKE ONE TABLET BY MOUTH DAILY (GENERIC LODINE)  naproxen-diphenhydramine 220-25 mg tab Take  by mouth. Current Inpatient Medications:  
 
Current Facility-Administered Medications Medication Dose Route Frequency  ELECTROLYTE REPLACEMENT PROTOCOL - Potassium Standard  Dosing Details for Fluid Restricted Patients  1 Each Other PRN  
 ELECTROLYTE REPLACEMENT PROTOCOL - Magnesium   1 Each Other PRN  
 ELECTROLYTE REPLACEMENT PROTOCOL - Phosphorus  Standard Dosing  1 Each Other PRN  
 ELECTROLYTE REPLACEMENT PROTOCOL - Calcium   1 Each Other PRN  polyethylene glycol (MIRALAX) packet 17 g  17 g Oral DAILY  VANCOMYCIN INFORMATION NOTE   Other ONCE  
 amLODIPine (NORVASC) tablet 10 mg  10 mg Oral DAILY  piperacillin-tazobactam (ZOSYN) 3.375 g in 0.9% sodium chloride (MBP/ADV) 100 mL \"EXTENDED 4 HOUR INFUSION###\"  3.375 g IntraVENous Q8H  
 furosemide (LASIX) 100 mg in 0.9% sodium chloride 100 mL infusion  20 mg/hr IntraVENous CONTINUOUS  
 insulin glargine (LANTUS) injection 12 Units  12 Units SubCUTAneous DAILY  traMADol (ULTRAM) tablet 50 mg  50 mg Oral Q8H PRN  
 nystatin (MYCOSTATIN) 100,000 unit/mL oral suspension 500,000 Units  500,000 Units Oral QID  insulin lispro (HUMALOG) injection   SubCUTAneous AC&HS  hydrALAZINE (APRESOLINE) 20 mg/mL injection 20 mg  20 mg IntraVENous Q4H PRN  
 dextrose 10% infusion 125-150 mL  125-150 mL IntraVENous PRN  
 acetaminophen (TYLENOL) tablet 650 mg  650 mg Oral Q4H PRN  
 metoprolol (LOPRESSOR) injection 5 mg  5 mg IntraVENous Q6H  
 sodium chloride (NS) flush 5-10 mL  5-10 mL IntraVENous PRN  
 heparin 25,000 units in D5W 250 ml infusion  18-36 Units/kg/hr IntraVENous TITRATE  levothyroxine (SYNTHROID) tablet 112 mcg  112 mcg Oral ACB  VANCOMYCIN INFORMATION NOTE 1 Each  1 Each Other Rx Dosing/Monitoring  glucose chewable tablet 16 g  4 Tab Oral PRN  
 glucagon (GLUCAGEN) injection 1 mg  1 mg IntraMUSCular PRN  pantoprazole (PROTONIX) injection 40 mg  40 mg IntraVENous DAILY Review of Systems:  
Difficult to obtain. No fever or chills. No sore throat.  No cough or hemoptysis. Fair appetite. No nausea, vomiting, abdominal pain, melena or hematochezia. No constipation or diarrhea. No dysuria, no gross hematuria of voiding difficulties. Has had le skin redness for long time. No dizziness or lightheadedness. No headaches. Physical Assessment:  
 
Vitals:  
 06/07/19 1100 06/07/19 1240 06/07/19 1300 06/07/19 1400 BP: 159/56 152/69 154/43 177/66 Pulse: (!) 58  61 61 Resp: 12  11 9 Temp:  96.6 °F (35.9 °C) SpO2: 100%  100% 100% Weight:      
Height:      
 
Last 3 Recorded Weights in this Encounter 06/05/19 8477 06/06/19 1019 06/07/19 5159 Weight: 109.6 kg (241 lb 10 oz) 114.5 kg (252 lb 6.8 oz) 113.4 kg (250 lb) Admission weight: Weight: 113.4 kg (250 lb) (06/03/19 0140) Intake/Output Summary (Last 24 hours) at 6/7/2019 1508 Last data filed at 6/7/2019 1400 Gross per 24 hour Intake 1971.93 ml Output 2350 ml Net -378.07 ml Patient is in no apparent distress. HEENT: Head is normocephalic and atraumatic. Pupils are round, equal, reactive to light. Sclerae are anicteric. Oral mucosa is dry. Neck: no cervical lymphadenopathy or thyromegaly. Rt ij cental line in place. Lungs: diminished air entry at the bases, no crackles. Trachea at the midline. Cardiovascular system: S1, S2, regular rate and rhythm. No murmurs, gallops or rubs. No jvd. Carotid upstroke 2 + bilaterally. Abdomen: obese, soft, non tender, non distended. Positive bowel sounds. No hepatosplenomegaly. No abdominal bruits. Extremities: no clubbing. 2+ bl pitting le edema. Delayed capillary refill on the toes bilaterally. Integumentary: pretibial dressings intact. Feet dorsal surfaces erythematous. Neurologic: Alert but slow to respond, confused. No gross motor or sensory deficits. Data Review: 
 
Labs: Results:  
   
Chemistry Recent Labs  
  06/07/19 
0430 06/06/19 
0453 06/05/19 
0210 * 168* 256*  135* 133* K 3.0* 3.8 4.0  
  102 101 CO2 24 26 20* BUN 41* 38* 33* CREA 1.81* 1.69* 1.55* CA 8.4* 8.4* 8.4* AGAP 12 7 12 BUCR 23* 22* 21* CBC w/Diff Recent Labs  
  06/07/19 
0430 06/06/19 
0453 06/05/19 
0210 WBC 13.0 17.2* 13.9*  
RBC 2.64* 3.34* 3.65* HGB 7.3* 9.2* 10.0* HCT 22.1* 28.5* 31.2*  
 219 183 GRANS 86* 91* 91* LYMPH 7* 5* 4*  
EOS 0 0 0 Iron/Ferritin No results for input(s): IRON in the last 72 hours. No lab exists for component: TIBCCALC  
PTH/VIT D No results for input(s): PTH in the last 72 hours. No lab exists for component: VITD Law Jauregui M.D [facility] Office [phone] Pager [phone] June 7, 2019

## 2019-06-07 NOTE — ROUTINE PROCESS
Bedside shift change report given to Chiquis Calloway RN and [facility] CALDERON (oncoming nurse) by Slime Ely (offgoing nurse). Report included the following information SBAR, Kardex, ED Summary, Intake/Output, MAR, Recent Results, Med Rec Status, Cardiac Rhythm A FIB and Alarm Parameters .

## 2019-06-07 NOTE — MANAGEMENT PLAN
Discharge/Transition Planning 
  
Problem: Discharge Planning Goal: *Discharge to safe environment Outcome: Progressing Towards Goal 
  
SNF 
  
Plan remains SNF at this time. Pt in ICU still. Has [employer] Medicare which will require prior auth Yannick Mercer RN BSN Outcomes Manager Pager # [phone]

## 2019-06-07 NOTE — PROGRESS NOTES
Internal Medicine Progress Note Patient's Name: Wendie Richardson Admit Date: 6/3/2019 Length of Stay: 4 Assessment/Plan Principal Problem: 
  Sepsis ([facility] (6/3/2019) Active Problems: 
  hypothyroid () Essential hypertension (9/3/2015) Type 2 diabetes with nephropathy ([address] (3/23/2018) Severe obesity ([facility] (5/9/2019) Hypoglycemia (6/3/2019) PAD (peripheral artery disease) ([address] (6/3/2019) Acute respiratory failure ([address] (6/3/2019) Cellulitis (6/3/2019) Acute on chronic combined systolic and diastolic heart failure ([facility] (6/4/2019) Sepsis - possible 2/2 BLE cellulitis, PNA? 
- PCCM consulted - appreciate - IV abx: vanc/zosyn 
- leukocytosis resolved - wound care for BLE 
- 1/2 BCx 6/3 positive for coag neg staph 
- repeat BCx 6/6 
  
Acute Resp Failure - bipap, NC as tolerated - tx CHF 
  
Diabetes 
- initially hypoglycemic 
- now maintaining blood sugars 
- monitor BG 
- lantus/SSI 
  
Acute on chronic CHF 
- BNP 52640, left pleural effusion 
- diuresis 
- monitor BMP 
- echo done, results below 
- strict I&Os 
  
PAD with arterial occlusion - VS consulted - appreciate -  BLE duplex - b/l PAD, mod stenosis in R CF artery, absent R PT artery consistent with occlusion, absent L distal pop/PT/AT arteries consistent with occlusion. 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 
- Hep gtt 
- no emergent vascular intervention - pt unstable 
- will eventually need aortogram with BLE r/o with possible intervention per VS 
  
EMMETT 
- monitor BMP 
- creatinine rising 
  
HTN 
- monitor  
- add PO meds 
 
- Cont acceptable home medications for chronic conditions  
- DVT protocol I have personally reviewed all pertinent labs and films that have officially resulted over the last 24 hours. I have personally checked for all pending labs that are awaiting final results. Interval History Per H&P, \"Nette Lama is a [de-identified] y.o. female who was found down by her son.  She had weakness and altered mental status.  She also had SOB.  She has a known history of Diabetes.  In the ER she was also found to have hypoglycemia. Gunnar Kulkarni is noted to have decreased pulses bilaterally.  She also was found to have significant respiratory distress and was started on BiPap.  Vascular surgery was called because of decreased perfusion to both feet.  She has severe erythema involving both feet as well as her abdomen and below her breast. Clance Feast is significant concern for sepsis along with her acute respiratory failure.  She is admitted for ongoing management. \" 
  
Hypoglycemia initially managed with D10 bolus and D20 infusion. Vascular surgery recommended heparin gtt and imaging. BLE duplex - b/l PAD, mod stenosis in R CF artery, absent R PT artery consistent with occlusion, absent L distal pop/PT/AT arteries consistent with occlusion. 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 No emergent surgical intervention per VS until patient stabilizes. Wound Care consulted. Patient also found to be in heart failure, bumex started. BCx 6/3 positive for coag neg staph in 1/2 bottles. Repeat BCx done 6/6. Creatinine noted to be rising gradually. PT/OT recommending SNF. ST recs puree/nectar thick liquids. Subjective Pt s/e @ bedside. No major events overnight. Pt offers no complaints this AM. Denies CP or SOB. Denies abd pain Objective Visit Vitals /72 Pulse (!) 59 Temp 98.9 °F (37.2 °C) Resp 14 Ht 5' 5\" (1.651 m) Wt 113.4 kg (250 lb) SpO2 100% BMI 41.60 kg/m² Physical Exam: 
General Appearance: NAD, somnolent, HENT: normocephalic/atraumatic, moist mucus membranes Neck: No JVD, supple Lungs: CTA with normal respiratory effort, no wheeze, crackles, or rhonchi CV: RRR, no m/r/g Abdomen: soft, non-tender, active bowel sounds Extremities: no cyanosis, peripheral edema Neuro: somnolent on exam, does not follow commands, eyes open and tracking Intake and Output: Current Shift:  06/07 0701 - 06/07 1900 In: 280.4 [P.O.:20; I.V.:260.4] Out: 350 [Urine:350] Last three shifts:  06/05 1901 - 06/07 0700 In: 2108.6 [P.O.:110; I.V.:1998.6] Out: 2625 [Urine:2625] Lab/Data Reviewed: All lab results for the last 24 hours reviewed. Imaging Reviewed: 
No results found. Medications Reviewed: 
Current Facility-Administered Medications Medication Dose Route Frequency  potassium chloride 10 mEq in 100 ml IVPB  10 mEq IntraVENous Q1H  
 ELECTROLYTE REPLACEMENT PROTOCOL - Potassium Standard  Dosing Details for Fluid Restricted Patients  1 Each Other PRN  
 ELECTROLYTE REPLACEMENT PROTOCOL - Magnesium   1 Each Other PRN  
 ELECTROLYTE REPLACEMENT PROTOCOL - Phosphorus  Standard Dosing  1 Each Other PRN  
 ELECTROLYTE REPLACEMENT PROTOCOL - Calcium   1 Each Other PRN  polyethylene glycol (MIRALAX) packet 17 g  17 g Oral DAILY  VANCOMYCIN INFORMATION NOTE   Other ONCE  
 amLODIPine (NORVASC) tablet 10 mg  10 mg Oral DAILY  piperacillin-tazobactam (ZOSYN) 3.375 g in 0.9% sodium chloride (MBP/ADV) 100 mL \"EXTENDED 4 HOUR INFUSION###\"  3.375 g IntraVENous Q8H  
 furosemide (LASIX) 100 mg in 0.9% sodium chloride 100 mL infusion  20 mg/hr IntraVENous CONTINUOUS  
 insulin glargine (LANTUS) injection 12 Units  12 Units SubCUTAneous DAILY  traMADol (ULTRAM) tablet 50 mg  50 mg Oral Q8H PRN  
 nystatin (MYCOSTATIN) 100,000 unit/mL oral suspension 500,000 Units  500,000 Units Oral QID  insulin lispro (HUMALOG) injection   SubCUTAneous AC&HS  hydrALAZINE (APRESOLINE) 20 mg/mL injection 20 mg  20 mg IntraVENous Q4H PRN  
 dextrose 10% infusion 125-150 mL  125-150 mL IntraVENous PRN  
 acetaminophen (TYLENOL) tablet 650 mg  650 mg Oral Q4H PRN  
 metoprolol (LOPRESSOR) injection 5 mg  5 mg IntraVENous Q6H  
 sodium chloride (NS) flush 5-10 mL  5-10 mL IntraVENous PRN  
 heparin 25,000 units in D5W 250 ml infusion  18-36 Units/kg/hr IntraVENous TITRATE  levothyroxine (SYNTHROID) tablet 112 mcg  112 mcg Oral ACB  VANCOMYCIN INFORMATION NOTE 1 Each  1 Each Other Rx Dosing/Monitoring  glucose chewable tablet 16 g  4 Tab Oral PRN  
 glucagon (GLUCAGEN) injection 1 mg  1 mg IntraMUSCular PRN  pantoprazole (PROTONIX) injection 40 mg  40 mg IntraVENous DAILY Valerie Uribe PA-C [address] [place], [place] [address]ialty [facility] Hospitalist Division Pager: [phone] Office: [phone]

## 2019-06-07 NOTE — PROGRESS NOTES
Problem: Dysphagia (Adult) Goal: *Acute Goals and Plan of Care (Insert Text) Description Recommendations: 
Diet: NPO Meds: via IV Aspiration Precautions Oral Care TID Other: MBS next day if stable Goals:  Patient will: 1. Tolerate PO trials with 0 s/s overt distress in 4/5 trials 2. Utilize compensatory swallow strategies/maneuvers (decrease bite/sip, size/rate, alt. liq/sol) with min cues in 4/5 trials 3. Perform oral-motor/laryngeal exercises to increase oropharyngeal swallow function with min cues 4. Complete an objective swallow study (i.e., MBSS) to assess swallow integrity, r/o aspiration, and determine of safest LRD, min A Outcome: Progressing Towards Goal 
  
SPEECH PATHOLOGY MODIFIED BARIUM SWALLOW STUDY & TREATMENT Patient: Aidee Shine (29 y.o. female) Date: 6/7/2019 Primary Diagnosis: Hypoglycemia [E16.2] PAD (peripheral artery disease) ([facility] [I73.9] Diabetes ([address] [E11.9] Precautions: aspiration  Fall PLOF:independent ASSESSMENT : 
Based on the objective data described below, the patient presents with severe oral, moderate pharyngeal dysphagia. Patient with OVERT aspirationof thin liquids via cup sip; unable to follow commands for compensatory strategies. Patient with nectar and puree trials with swallow delay, premature spillage, and oral delay. Pudding took >30 seconds to swallow ~50% of bolus+ nectar thick was. No penetration/aspiration evident. Patient with oral residuals, vallecula and pyriform residuals, placing patient at high risk of aspiration. Patient not able to swallow after 3 trials stating \"I can't\" when told to swallow residuals in oral cavity. Patient suctioned via Yoandy Olivas. SLP recommending NPO with alt means of nutrition. Discussed with RN Treatment: 
Skilled therapy initiated: Educated pt on MBS results, aspiration precautions, and importance of compensatory swallow techniques to decrease aspiration risk (decrease rate of intake & sip/bite size, upright @HOB for all po intake and ~30 minutes after po); verbalized comprehension. SLP to follow as indicated. Patient will benefit from skilled intervention to address the above impairments. Patient's rehabilitation potential is considered to be Guarded Factors which may influence rehabilitation potential include:  
? None noted ? Mental ability/status ? Medical condition ? Home/family situation and support systems ? Safety awareness ? Pain tolerance/management ? Other: PLAN : 
Recommendations and Planned Interventions: NPO Frequency/Duration: Patient will be followed by speech-language pathology 1-2 times per day/4-7 days per week to address goals. Discharge Recommendations: Inpatient Rehab and [facility] SUBJECTIVE:  
Patient stated ? I can't? . 
 
OBJECTIVE:  
 
Past Medical History:  
Diagnosis Date AC joint arthropathy   
 right Acne rosacea Bursitis of shoulder   
 rotator cuff Burst blood vessel in eye, left 02/2018  
 ruptured blood vessel left Diabetes ([address] Diabetic retinopathy ([address] severe, non-proliferative Discoid lupus Duodenal ulcer Facet joint disease   
 cervical spine, injections Fibromyalgia Hiatal hernia Hypercholesterolemia Hypertension   
 hypothyroid Impingement syndrome of shoulder   
 left Lumbar scoliosis Nonexudative age-related macular degeneration   
 severe Open-angle glaucoma   
 severe Rectocele Past Surgical History:  
Procedure Laterality Date COLONOSCOPY  2007  
 polyps, Dr. Jarrell Phy HC ADMIN HEPATITIS B VACC  unk HX CARPAL TUNNEL RELEASE    
 HX CHOLECYSTECTOMY  2007 HX KNEE ARTHROSCOPY HX SHOULDER ARTHROSCOPY    
 right Home Situation:  
Home Situation Home Environment: Private residence # Steps to Enter: 4 One/Two Story Residence: One story Living Alone: Yes Support Systems: Child(tressa) Patient Expects to be Discharged to[de-identified] Unknown Current DME Used/Available at Home: Shower chair, Adaptive dressing aides Tub or Shower Type: Tub/Shower combination(has shower chair; no grab bars) Diet prior to admission: regular Current Diet:  NPO Radiologist: [facility] Film Views: Lateral 
Patient Position: [id number] Trial 1: Trial 2:  
Consistency Presented: Thin liquid Consistency Presented: Nectar thick liquid How Presented: Cup/sip How Presented: Cup/sip Consistency Amount: 2 Consistency Amount: 2 Bolus Acceptance: Impaired Bolus Acceptance: Impaired Bolus Formation/Control: No impairment:   Bolus Formation/Control: Impaired: Delayed Propulsion: Absent Propulsion: Delayed (# of seconds) Oral Residue: Lingual  
Initiation of Swallow: Triggered at vallecula Initiation of Swallow: Triggered at valleculae Timing: Pooling 1-5 sec Timing: Pooling 6-10 sec Penetration: None Aspiration/Timing: During Aspiration/Timing: No evidence of aspiration Pharyngeal Clearance: Vallecular residue;Pyriform residue ;10-50% Pharyngeal Clearance: Vallecular residue;Pyriform residue ;10-50% Attempted Modifications: Double swallow Attempted Modifications: Double swallow Effective Modifications: Double swallow Effective Modifications: Double swallow Cues for Modifications: Minimal Cues for Modifications: Moderate Trial 3: Trial 4:  
Consistency Presented: Pudding How Presented: SLP-fed/presented;Spoon Consistency Amount: 1 Bolus Acceptance: Impaired Bolus Formation/Control: Impaired: Delayed  :    
Propulsion: Absent Oral Residue: Lingual    
Initiation of Swallow: Triggered at valleculae Timing: Pooling 6-10 sec Penetration: None Aspiration/Timing: No evidence of aspiration Pharyngeal Clearance: Vallecular residue;Pyriform residue ;10-50% Attempted Modifications: Double swallow;Small sips and bites; Alternate liquids/solids Decreased Tongue Base Retraction?: Yes Laryngeal Elevation: Incomplete laryngeal closure Aspiration/Penetration Score: 6 (Aspiration-Contrast passes below the cords/glottis, and is cleared) Pharyngeal Symmetry: Not assessed Pharyngeal-Esophageal Segment: No impairment Pharyngeal Dysfunction: Decreased strength;Decreased elevation/closure;Decreased tongue base retraction Oral Phase Severity: Severe Pharyngeal Phase Severity: Moderate 8-point Penetration-Aspiration Scale: Score 6 PAIN: 
Pain level pre-treatment: 0/10 Pain level post-treatment: 0/10 Pain Intervention(s): Medication (see MAR); Rest, Ice, Repositioning Response to intervention: Nurse notified, See doc flow COMMUNICATION/EDUCATION:  
?  Patient educated regarding MBS results and diet recommendations. ?  Patient/family have participated as able in goal setting and plan of care. ?  Patient/family agree to work toward stated goals and plan of care. ?  Patient understands intent and goals of therapy, but is neutral about his/her participation. ? Patient is unable to participate in goal setting and plan of care. Thank you for this referral. 
Tawnya Roberts [facility] SLP Time Calculation: 60 mins MBS Time: 30 minutes Treatment Time: 30 minutes

## 2019-06-07 NOTE — PROGRESS NOTES
Pharmacy Dosing Services: Vancomycin Indication: Sepsis of unknown Origin / BSI? ? Day of therapy: 4 Other Antimicrobials (Include dose, start day & day of therapy): 
Pip/tazo 3.375 gm IV every 8 hours extended infusion Goal Vancomycin Level: 15-20 
(Trough 15-20 for most infections, 20 for meningitis/osteomyelitis, pre-HD level ~25) Vancomycin Level (if drawn):  
6/5 - 19.5 (23 hours post-dose) 6/7 - 29.7 (19.3 hours post dose) Significant Cultures:  
6/3 Blood - GPC - pending Renal function stable? (unstable defined as SCr increase of 0.5 mg/dL or > 50% increase from baseline, whichever is greater) (Y/N): N (increasing) CAPD, Hemodialysis or Renal Replacement Therapy (Y/N): N Recent Labs  
  19 
0430 19 
0453 19 
0210 CREA 1.81* 1.69* 1.55* BUN 41* 38* 33* WBC 13.0 17.2* 13.9* Temp (24hrs), Av.1 °F (36.7 °C), Min:97.6 °F (36.4 °C), Max:98.9 °F (37.2 °C) Creatinine Clearance (Creatinine Clearance (ml/min)): 31.3 ml/min Regimen assessment:  
- Renal function continues to decline and decreased urine output  
- Random level taken after dose was reduced to 1250 mg is elevated at 29.7 
- Will start dosing per level until renal function starts to improve Maintenance dose: Dosing per level Next scheduled level:  random  at 0100 Pharmacy will follow daily and adjust medications as appropriate for renal function and/or serum levels. Thank you, Halima Odom Carp

## 2019-06-07 NOTE — PROGRESS NOTES
Problem: Self Care Deficits Care Plan (Adult) Goal: *Acute Goals and Plan of Care (Insert Text) Description Occupational Therapy Goals Initiated 6/6/2019 within 7 day(s). 1.  Patient will perform grooming tasks at EOB with moderate assistance and minimal verbal cues for attention to task. 2.  Patient will perform upper body dressing with moderate assistance and fair dynamic sitting balance. 3.  Patient will perform functional task at EOB for 8 minutes with minimal assistance for balance and < 5 rest breaks to increase activity tolerance for ADLs. 4.  Patient will perform toilet transfers with maximum assistance. 5.  Patient will perform all aspects of toileting with maximum assistance. Samaria Riser 6.  Patient will participate in upper extremity therapeutic exercise/activities for 8 minutes with minimal assistance to increase BUE strength/AROM for functional transfers and ADLs. 7.  Patient will utilize energy conservation techniques during functional activities with minimal verbal cues. Outcome: Progressing Towards Goal 
 OCCUPATIONAL THERAPY TREATMENT Patient: Luanne Tobin (46 y.o. female) Date: 6/7/2019 Diagnosis: Hypoglycemia [E16.2] PAD (peripheral artery disease) ([facility] [address] [I73.9] Diabetes ([facility] [address] [E11.9] Sepsis ([facility] [address] Precautions: Fall PLOF: Independent Chart, occupational therapy assessment, plan of care, and goals were reviewed. ASSESSMENT: 
Pt initially unresponsive, non-verbal w/fixed gaze. Alerted NSG, Wolf Engel. Pt slowly responding to Wolf Garcia and Crespo Virgie. BUE edematous. Performs retrograde massage and PROM BUE. Pt requires hand over hand and Max Assist w/ADL grooming tasks at bed level. Pt appears brighter at end of session. BUE elevated. Progression toward goals: 
?          Improving appropriately and progressing toward goals ? Improving slowly and progressing toward goals ? Not making progress toward goals and plan of care will be adjusted PLAN: 
 Patient continues to benefit from skilled intervention to address the above impairments. Continue treatment per established plan of care. Discharge Recommendations:  Jack Ventura Further Equipment Recommendations for Discharge:  rolling walker and wheelchair SUBJECTIVE:  
Patient stated ? Tal Grubbs. ? OBJECTIVE DATA SUMMARY:  
Cognitive/Behavioral Status: 
Neurologic State: Alert Orientation Level: Oriented to person, Oriented to place Cognition: Follows commands Safety/Judgement: Insight into deficits Functional Mobility and Transfers for ADLs: 
ADL Intervention: 
Grooming Washing Face: Maximum assistance(w/RUE and hand over hand assist) Washing Hands: Total assistance (dependent) Brushing Teeth: Maximum assistance(w/RUE and hand over hand assist) UE Therapeutic Exercises: PROM BUE shoulder flexion, elbow flexion/extension w/resistance Pain: 
Pain level pre-treatment: 0/10 Pain level post-treatment: 0/10 Activity Tolerance:   
Poor Please refer to the flowsheet for vital signs taken during this treatment. After treatment:  
?  Patient left in no apparent distress sitting up in chair ? Patient left in no apparent distress in bed 
? Call bell left within reach ? Nursing notified ? Caregiver present ? Bed alarm activated COMMUNICATION/EDUCATION:  
? Role of Occupational Therapy in the acute care setting 
? Home safety education was provided and the patient/caregiver indicated understanding. ? Patient/family have participated as able in working towards goals and plan of care. ? Patient/family agree to work toward stated goals and plan of care. ? Patient understands intent and goals of therapy, but is neutral about his/her participation. ? Patient is unable to participate in goal setting and plan of care. Thank you for this referral. 
CARLOS ALBERTO De Leon Time Calculation: 25 mins

## 2019-06-07 NOTE — ROUTINE PROCESS
Bedside shift change report given to [facility] [address] and Lazarus Hansen RN (oncoming nurse) by  WOO Ch RN (offgoing nurse). Report included the following information SBAR, Kardex, ED Summary, Intake/Output, MAR, Recent Results, Med Rec Status, Cardiac Rhythm AFIB and Alarm Parameters .

## 2019-06-07 NOTE — PROGRESS NOTES
[address] [place] [address] [facility] Progress Note Name: Vlad Simon : 1940 MRN: [id number] Date: 2019 2:07 PM 
  
[x]I have reviewed the flowsheet and previous days notes. Events overnight reviewed and discussed with nursing staff. Vital signs and records reviewed. Subjective: 
19: Hallie N/c during the day & BiPAP at night Passed swallow eval (with SLP) Steroids ended yesterday No new recommendations from Vascular surgery. Will Plan to Transition Heparin gtt to PO A/C (once no HD recommendations are mentioned / planned by Renal) Continue PT/OT/Vest Rx Poor response to lasix gtt despite albumin IVP & Zaroxolyn = Renal consult today []The patient is unable to give any meaningful history or review of systems because the patient is: 
[]Intubated []Sedated  
[]Unresponsive []The patient is critically ill on     
[]Mechanical ventilation []Pressors []BiPAP [] ROS:A comprehensive review of systems was negative except for that written in the HPI. Medication Review: · Pressors - None · Sedation - None · Antibiotics - Zosyn, Vanco 
· Pain - APAP, PRN 
· GI/ DVT - Protonix IVP / Heparin gtt Vital Signs:   
Visit Vitals /72 Pulse (!) 59 Temp 98.9 °F (37.2 °C) Resp 14 Ht 5' 5\" (1.651 m) Wt 113.4 kg (250 lb) SpO2 100% BMI 41.60 kg/m² O2 Device: Nasal cannula O2 Flow Rate (L/min): 3 l/min Temp (24hrs), Av.2 °F (36.8 °C), Min:97.6 °F (36.4 °C), Max:98.9 °F (37.2 °C) Intake/Output:  
Last shift:       07 -  1900 In: 280.4 [P.O.:20; I.V.:260.4] Out: 350 [Urine:350] Last 3 shifts: 1901 -  0700 In: 2108.6 [P.O.:110; I.V.:1998.6] Out: 2625 [Urine:2625] Intake/Output Summary (Last 24 hours) at 2019 8461 Last data filed at 2019 0900 Gross per 24 hour Intake 2097.42 ml Output 2275 ml Net -177.58 ml Physical Exam: General: Lethargic. Cambridge x 1   No C/o's. HEENT:  Anicteric sclerae; pink palpebral conjunctivae; mucosa moist 
Resp:  Symmetrical chest rise, no accessory muscle use. BS = B/L Clear, decreased throughout (> bases). No rales/ wheezing/ rhonchi noted CV:  Afib, rate controlled. No m/g/r 
GI:  Abdomen soft, non-tender; (+) active bowel sounds Extremities:  +2 pulses on B/L UE's.  +2 pulses on B/L Groins. Absent Pulses on B/L LE's (DP/PT). B/L LE's erythematous. +2 B/L LE's edema Skin:  Warm; + Edema (> LE's) Neurologic:  Lethargic on exam.  Will arouse with verbal stimulation. Cambridge x 1. BURGESS to commands (weakly) Devices:  No NGT/OGT/ ETTube.  + Central line & Jackson. DATA:  
 
Current Facility-Administered Medications Medication Dose Route Frequency  [START ON 6/8/2019] VANCOMYCIN INFORMATION NOTE   Other ONCE  potassium chloride 10 mEq in 100 ml IVPB  10 mEq IntraVENous Q1H  
 ELECTROLYTE REPLACEMENT PROTOCOL - Potassium Standard  Dosing Details for Fluid Restricted Patients  1 Each Other PRN  
 ELECTROLYTE REPLACEMENT PROTOCOL - Magnesium   1 Each Other PRN  
 ELECTROLYTE REPLACEMENT PROTOCOL - Phosphorus  Standard Dosing  1 Each Other PRN  
 ELECTROLYTE REPLACEMENT PROTOCOL - Calcium   1 Each Other PRN  
 amLODIPine (NORVASC) tablet 10 mg  10 mg Oral DAILY  piperacillin-tazobactam (ZOSYN) 3.375 g in 0.9% sodium chloride (MBP/ADV) 100 mL \"EXTENDED 4 HOUR INFUSION###\"  3.375 g IntraVENous Q8H  
 furosemide (LASIX) 100 mg in 0.9% sodium chloride 100 mL infusion  20 mg/hr IntraVENous CONTINUOUS  
 insulin glargine (LANTUS) injection 12 Units  12 Units SubCUTAneous DAILY  traMADol (ULTRAM) tablet 50 mg  50 mg Oral Q8H PRN  
 nystatin (MYCOSTATIN) 100,000 unit/mL oral suspension 500,000 Units  500,000 Units Oral QID  insulin lispro (HUMALOG) injection   SubCUTAneous AC&HS  hydrALAZINE (APRESOLINE) 20 mg/mL injection 20 mg  20 mg IntraVENous Q4H PRN  
  dextrose 10% infusion 125-150 mL  125-150 mL IntraVENous PRN  
 acetaminophen (TYLENOL) tablet 650 mg  650 mg Oral Q4H PRN  
 metoprolol (LOPRESSOR) injection 5 mg  5 mg IntraVENous Q6H  
 sodium chloride (NS) flush 5-10 mL  5-10 mL IntraVENous PRN  
 heparin 25,000 units in D5W 250 ml infusion  18-36 Units/kg/hr IntraVENous TITRATE  levothyroxine (SYNTHROID) tablet 112 mcg  112 mcg Oral ACB  VANCOMYCIN INFORMATION NOTE 1 Each  1 Each Other Rx Dosing/Monitoring  glucose chewable tablet 16 g  4 Tab Oral PRN  
 glucagon (GLUCAGEN) injection 1 mg  1 mg IntraMUSCular PRN  pantoprazole (PROTONIX) injection 40 mg  40 mg IntraVENous DAILY Labs: Results:  
   
Chemistry Recent Labs  
  06/07/19 
0430 06/06/19 
0483 06/05/19 0210 * 168* 256*  135* 133* K 3.0* 3.8 4.0  
 102 101 CO2 24 26 20* BUN 41* 38* 33* CREA 1.81* 1.69* 1.55* CA 8.4* 8.4* 8.4* AGAP 12 7 12 BUCR 23* 22* 21* CBC w/Diff Recent Labs  
  06/07/19 
0430 06/06/19 
0453 06/05/19 
0210 WBC 13.0 17.2* 13.9*  
RBC 2.64* 3.34* 3.65* HGB 7.3* 9.2* 10.0* HCT 22.1* 28.5* 31.2*  
 219 183 GRANS 86* 91* 91* LYMPH 7* 5* 4*  
EOS 0 0 0 Coagulation Recent Labs  
  06/07/19 
0550 06/06/19 2015 APTT 85.5* 94.9* Liver Enzymes No results for input(s): TP, ALB, TBIL, AP, SGOT, GPT in the last 72 hours. No lab exists for component: DBIL  
ABG No results found for: PH, PHI, PCO2, PCO2I, PO2, PO2I, HCO3, HCO3I, FIO2, FIO2I Microbiology Recent Labs  
  06/06/19 
6598 06/06/19 
0840 CULT NO GROWTH AFTER 22 HOURS NO GROWTH AFTER 22 HOURS Telemetry: []Sinus []A-flutter []Paced [x]A-fib []Multiple PVCs Imaging: 
None recently IMPRESSION:  
Altered mental status Hypercapnia still requiring BiPAP Gram-positive cocci bacteremia with identification pending Bilateral lower extremity cellulitis? Bilateral lower extremity peripheral arterial disease Lower extremity ischemia likely chronic Diffuse edema and volume overload Persistent hypoglycemia likely reflecting inadvertent glipizide overdose Hypervolemia hyponatremia still warranting diuresis Anemia of unclear etiology. Likely component of volume resuscitation Generalized weakness RECOMMENDATIONS:  
· Resp:  No recent PCXR or ABG. Tolerating N/c during the day & BiPAP qHS. 02 sat's high 90's. Continue pulmonary toilet (Vest Therapy q-4 hours) & OOB as hayden. Titrate FiO2/ supp O2 for SpO2 >90% · I/D: Afebrile. WBC trending down (13 K). +UTI via U/a (6/3/2019). + BCx (6/3 = contaminated), repeat BCx obtained yesterday & pending. Continue ABX (Vanco / Zosyn) until final Cx's resulted · Hem/Onc: Hct down this AM (22.1). PLT's stable (194 K). PTT 85.5 (0600). Daily CBC. PTT checks per Heparin gtt protocol. Will eventually change Heparin gtt to PO A/C  
· CVS: Stable. No gtt's. Tele = Afib, rate controlled. Goal SBP < 170   Continue Lopressor 5 mg IVP q-6 hours & Norvasc 10 mg PO Daily. Continue Hydralazine IVP PRN  
· Metabolic: Daily BMP; monitor e-lytes; replace PRN 
· Renal: O > I's however requiring high dose of lasix gtt to achieve this. Cr rising daily (1.81). Will obtain Renal consult for further diuresis mgmt (+/- HD). K+ 3.0 = replaced per protocol. Avoid NSAIDS. Na stable (138). Trend Renal indices; Diuresis, Jackson to BSD · Endocrine: POC Glucose q6; Continue Synthroid for Thyroid d/o 
· GI: Hayden PO diet (dysphagia pureed). SLP following.    - BM this Admission. Will begin bowel regimen (Miralax daily). Zofran PRN for N/V  
· Musc/Skin: Wound Care following for B/L LE's Mgmt · Neuro: Lethargic but will arouse with stimulation. Leon x 1. BURGESS to commands (weakly). NAD. No C/o's. · Vascular Surgery following. No new imaging done & no intervention planned. · Fluids: None 
· PT/OT following · To Be Discussed in Interdisciplinary Rounds (Continue ICU Care) Best Practices/ Safety Bundles: 
· Sepsis Bundle per Hospital Protocol · CAUTI Bundle · Electrolyte Replacement Bundle · Glycemic control goal <180; avoid Hypoglycemia · IHI ICU Bundles: 
·  Central Line Bundle Followed  and Lozoya Bundle Followed · Mech Vent patients: · VAP bundle, Aim to keep peak plateau pressure 85-83MZ H2O 
· Aspiration Precautions - HOB >30' · Daily sedation holiday as indicated · SBT as tolerated/appropriate · Titrate FiO2 for SpO2 >94% · Aggressive Pulmonary Hygeiene · Stress ulcer prophylaxis. Protonix IVP · DVT prophylaxis. Heparin gtt · Need for Lines, lozoya assessed. · Restraints need. · Palliative care evaluation. The patient is: [x] acutely ill Risk of deterioration: [] moderate  
 [] critically ill  [x] high  
 
[x]See my orders for details My assessment/plan was discussed with: 
[x]Nursing []PT/OT   
[]Respiratory therapy []  
[]Family [] Critical Care time = 30 Minutes Cite Ross Gutierrez 
06/07/19 Pulmonary, Critical Care Medicine [facility]

## 2019-06-08 PROBLEM — D64.9 ANEMIA: Status: ACTIVE | Noted: 2019-01-01

## 2019-06-08 NOTE — PROGRESS NOTES
RENAL PROGRESS NOTE Doreen Ocasio Assessment/Plan:  
 
 
Assessment: · EMMETT: nonoliguric. Creat stabilized off diuretic. Would try to keep Neg fluid balance 500-1000 ml/day with limiting input & diuresing with aid of IV Albumin. Renal US. · HypoK: replace K/Mg. · HFpEF/Volume overload: Improved with diuresis. · Resp failure: improved with diuresis. · BLE Cellulitis/sepsis in a setting of pad/chronic venous insufficiency. · HTN: controlled. · UTI: f/u culture. Antibiotics. · AMS: from Hypoglycemia. Tube feeds. Subjective:Patient complaints of: Nonverbal. D/w  at bedside. Patient Active Problem List  
Diagnosis Code  Edema R60.9  Cough R05  Hypercholesterolemia E78.00  Fibromyalgia M79.7  hypothyroid E07.9  Dermatitis L30.9  Essential hypertension I10  Type 2 diabetes mellitus with mild nonproliferative diabetic retinopathy without macular edema ([facility] [address] T13.0254  Type 2 diabetes with nephropathy ([facility]) E11.21  
 Severe obesity ([facility]) E66.01  
 Hypoglycemia E16.2  PAD (peripheral artery disease) ([facility]) I73.9  Sepsis ([facility] [address] A41.9  Acute respiratory failure ([facility]) J96.00  
 Cellulitis L03.90  Acute on chronic combined systolic and diastolic heart failure ([facility]) I50.43 Current Facility-Administered Medications Medication Dose Route Frequency Provider Last Rate Last Dose  potassium chloride in water 10 mEq/100 mL IVPB premix pgbk  potassium, sodium phosphates (NEUTRA-PHOS) packet 1 Packet  1 Packet Per G Tube Q1H Niyah Rao MD   1 Packet at 06/08/19 1031  
 0.9% sodium chloride infusion 250 mL  250 mL IntraVENous PRN Niyah aRo MD      
 ELECTROLYTE REPLACEMENT PROTOCOL - Potassium Standard  Dosing Details for Fluid Restricted Patients  1 Each Other PRN Niyah Rao MD      
  ELECTROLYTE REPLACEMENT PROTOCOL - Magnesium   1 Each Other PRN Gretchen Schneider MD      
 ELECTROLYTE REPLACEMENT PROTOCOL - Phosphorus  Standard Dosing  1 Each Other PRN Gretchen Schneider MD      
 ELECTROLYTE REPLACEMENT PROTOCOL - Calcium   1 Each Other PRN Gretchen Schneider MD      
 polyethylene glycol [facility]) packet 17 g  17 g Oral DAILY Ross Parish   17 g at 06/08/19 0900  
 VANCOMYCIN INFORMATION NOTE   Other Amber Leon MD      
 amLODIPine (NORVASC) tablet 10 mg  10 mg Oral DAILY NESHA Parish   10 mg at 06/08/19 5651  piperacillin-tazobactam (ZOSYN) 3.375 g in 0.9% sodium chloride (MBP/ADV) 100 mL \"EXTENDED 4 HOUR INFUSION###\"  3.375 g IntraVENous Q8H Sohail Rod PA 25 mL/hr at 06/08/19 1026 3.375 g at 06/08/19 1026  
 insulin glargine (LANTUS) injection 12 Units  12 Units SubCUTAneous DAILY Ross Parish   12 Units at 06/08/19 0837  
 traMADol (ULTRAM) tablet 50 mg  50 mg Oral Q8H PRN NESHA Parish   50 mg at 06/08/19 1025  nystatin (MYCOSTATIN) 100,000 unit/mL oral suspension 500,000 Units  500,000 Units Oral QID NESHA Parish   Stopped at 06/08/19 0900  
 insulin lispro (HUMALOG) injection   SubCUTAneous AC&HS Gretchen Schneider MD   Stopped at 06/06/19 2300  hydrALAZINE (APRESOLINE) 20 mg/mL injection 20 mg  20 mg IntraVENous Q4H PRN Gretchen Schneider MD   20 mg at 06/07/19 8090  dextrose 10% infusion 125-150 mL  125-150 mL IntraVENous PRN Gretchen Schneider  mL/hr at 06/08/19 0900  acetaminophen (TYLENOL) tablet 650 mg  650 mg Oral Q4H PRN NESHA Parish   650 mg at 06/06/19 1319  
 metoprolol (LOPRESSOR) injection 5 mg  5 mg IntraVENous Q6H Tl Rod PA   5 mg at 06/08/19 0931  
 sodium chloride (NS) flush 5-10 mL  5-10 mL IntraVENous PRN Peyton Felton MD   10 mL at 06/04/19 0249  heparin 25,000 units in D5W 250 ml infusion  18-36 Units/kg/hr IntraVENous TITRATE Maciej Blount MD 6.8 mL/hr at 06/08/19 0817 6 Units/kg/hr at 06/08/19 6263  levothyroxine (SYNTHROID) tablet 112 mcg  112 mcg Oral ACB Maciej Blount MD   112 mcg at 06/08/19 0600  
 VANCOMYCIN INFORMATION NOTE 1 Each  1 Each Other Rx Dosing/Monitoring Vinita Gallegos MD      
 glucose chewable tablet 16 g  4 Tab Oral PRN Ogbolu, Caitlyn I, NP   16 g at 06/08/19 0931  
 glucagon (GLUCAGEN) injection 1 mg  1 mg IntraMUSCular PRN Ogbolu, Caitlyn I, NP      
 pantoprazole (PROTONIX) injection 40 mg  40 mg IntraVENous DAILY Ogbolu, Caitlyn I, NP   40 mg at 06/08/19 5061 Objective Vitals:  
 06/08/19 0442 06/08/19 0500 06/08/19 0600 06/08/19 0700 BP:  151/57 163/54 158/43 Pulse:  (!) 57 60 (!) 56 Resp:  10 18 9 Temp:      
SpO2: 100% 100% 100% 100% Weight:      
Height:      
 
 
 
Intake/Output Summary (Last 24 hours) at 6/8/2019 1034 Last data filed at 6/8/2019 0700 Gross per 24 hour Intake 670.8 ml Output 1030 ml Net -359.2 ml Admission weight: Weight: 113.4 kg (250 lb) (06/03/19 0140) Last Weight Metrics: 
Weight Loss Metrics 6/7/2019 5/9/2019 9/20/2018 3/23/2018 8/4/2017 2/23/2017 10/6/2016 Today's Wt 250 lb 235 lb 207 lb 3.2 oz 209 lb 3.2 oz 195 lb 198 lb 4.8 oz 198 lb BMI 41.6 kg/m2 39.11 kg/m2 34.48 kg/m2 34.81 kg/m2 32.45 kg/m2 33 kg/m2 32.95 kg/m2 Physical Assessment:  
 
General: Nonverbal. 
Neck: No jvd. LUNGS: Clear to Auscultation, No rales, rhonchi or wheezes. CVS EXM: S1, S2  RRR, no murmurs/gallops/rubs. Abdomen: soft, non tender. Lower Extremities: + edema. Lab CBC w/Diff Recent Labs  
  06/08/19 
0400 06/07/19 
0430 06/06/19 
0453 WBC 11.4 13.0 17.2*  
RBC 2.37* 2.64* 3.34* HGB 6.5* 7.3* 9.2* HCT 20.3* 22.1* 28.5*  
 194 219 GRANS 73 86* 91* LYMPH 14* 7* 5* EOS 1 0 0 Chemistry Recent Labs  
  06/08/19 
0400 06/07/19 
1519 06/07/19 
0430 06/06/19 
5962 GLU 64*  --  131* 168*   --  138 135* K 3.1* 3.0* 3.0* 3.8   --  102 102 CO2 26  --  24 26 BUN 49*  --  41* 38* CREA 1.84*  --  1.81* 1.69* CA 8.4*  --  8.4* 8.4* AGAP 7  --  12 7 BUCR 27*  --  23* 22* ALB 3.3*  --   --   --   
PHOS 3.8  --   --   -- No results found for: IRON, FE, TIBC, IBCT, PSAT, FERR Lab Results Component Value Date/Time Calcium 8.4 (L) 06/08/2019 04:00 AM  
 Phosphorus 3.8 06/08/2019 04:00 AM  
  
 
Bradley Duran MD 
[facility] Pager: [phone]

## 2019-06-08 NOTE — PROGRESS NOTES
Internal Medicine Progress Note Patient's Name: Janette Moy Admit Date: 6/3/2019 Length of Stay: 5 Assessment/Plan Principal Problem: 
  Sepsis ([facility] [address] (6/3/2019) Active Problems: 
  hypothyroid () Essential hypertension (9/3/2015) Type 2 diabetes with nephropathy ([address] (3/23/2018) Severe obesity ([address] (5/9/2019) Hypoglycemia (6/3/2019) PAD (peripheral artery disease) ([address] (6/3/2019) Acute respiratory failure ([address] (6/3/2019) Cellulitis (6/3/2019) Acute on chronic combined systolic and diastolic heart failure ([facility] [address] (6/4/2019) Sepsis - possible 2/2 BLE cellulitis, PNA? 
- PCCM consulted - appreciate - IV abx: vanc/zosyn 
- leukocytosis resolved - wound care for BLE 
- 1/2 BCx 6/3 positive for coag neg staph 
- repeat BCx NGTD 
  
Acute Resp Failure - bipap, NC as tolerated - tx CHF 
  
Diabetes - Hypoglycemic this morning 
-mentation improving with glucose 
- monitor BG 
- lantus/SSI 
  
Acute on chronic CHF 
- BNP 73148, left pleural effusion 
- diuresis held due to EMMETT. - monitor BMP 
- echo done, results below 
- strict I&Os 
  
PAD with arterial occlusion - VS consulted - appreciate -  BLE duplex - b/l PAD, mod stenosis in R CF artery, absent R PT artery consistent with occlusion, absent L distal pop/PT/AT arteries consistent with occlusion. 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 
- Hep gtt 
- no emergent vascular intervention - pt unstable 
- will eventually need aortogram with BLE r/o with possible intervention per VS 
  
EMMETT 
- monitor BMP 
- creatinine rising 
  
HTN 
- monitor  
- add PO meds Anemia 
-blood transfusion 
 
- Cont acceptable home medications for chronic conditions  
- DVT protocol I have personally reviewed all pertinent labs and films that have officially resulted over the last 24 hours. I have personally checked for all pending labs that are awaiting final results. Interval History Per H&P, \"Nette Lama is a [de-identified] y.o. female who was found down by her son. Janusz Martinez had weakness and altered mental status.  She also had SOB.  She has a known history of Diabetes.  In the ER she was also found to have hypoglycemia. Janusz Martinez is noted to have decreased pulses bilaterally.  She also was found to have significant respiratory distress and was started on BiPap.  Vascular surgery was called because of decreased perfusion to both feet.  She has severe erythema involving both feet as well as her abdomen and below her breast. Wallace Kub is significant concern for sepsis along with her acute respiratory failure.  She is admitted for ongoing management. \" 
  
Hypoglycemia initially managed with D10 bolus and D20 infusion. Vascular surgery recommended heparin gtt and imaging. BLE duplex - b/l PAD, mod stenosis in R CF artery, absent R PT artery consistent with occlusion, absent L distal pop/PT/AT arteries consistent with occlusion. 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 No emergent surgical intervention per VS until patient stabilizes. Wound Care consulted. Patient also found to be in heart failure, bumex started. BCx 6/3 positive for coag neg staph in 1/2 bottles. Repeat BCx done 6/6. Creatinine noted to be rising gradually. PT/OT recommending SNF. ST recs puree/nectar thick liquids. Subjective Pt s/e @ bedside. Patient was hypoglycemic this morning, given glucose  Mental status still impaired. Anemic this morning, blood transfusion today. No complaints, not answering questions Objective Visit Vitals /62 (BP 1 Location: Left arm, BP Patient Position: At rest) Pulse 67 Temp 97.6 °F (36.4 °C) Resp 22 Ht 5' 5\" (1.651 m) Wt 113.4 kg (250 lb) SpO2 100% BMI 41.60 kg/m² Physical Exam: 
General Appearance: NAD,somnolent, fluid overload HENT: normocephalic/atraumatic, moist mucus membranes Neck: No JVD, supple Lungs: CTA with normal respiratory effort CV: RRR, no m/r/g 
 Abdomen: soft, non-tender, normal bowel sounds Extremities: no cyanosis, anasarca, pitting edema Neuro: eyes open, quickly shut, not following commands, somnolent Intake and Output: 
Current Shift:  No intake/output data recorded. Last three shifts:  06/06 1901 - 06/08 0700 In: 1967.8 [P.O.:20; I.V.:1947.8] Out: 2975 [Urine:2975] Lab/Data Reviewed: All lab results for the last 24 hours reviewed. Imaging Reviewed: 
Xr Abd (kub) Result Date: 6/8/2019 EXAM: Portable supine abdomen, one view INDICATION: Nasogastric tube placement COMPARISON: 1/7/2019 _______________ FINDINGS: Nasogastric tube has been advanced with its tip now projecting at the expected position of the distal stomach. No other changes. _______________ IMPRESSION: Nasogastric tube tip at the expected position of the distal stomach. Xr Abd (kub) Result Date: 6/8/2019 EXAM: Portable semiupright abdomen, one view INDICATION: NG tube placement COMPARISON: 6/7/2019 _______________ FINDINGS: Nasogastric tube slightly difficult to visualize due to underpenetration. Tip is visible to about the level of the EG junction and not definitively below the diaphragm. ET tube no longer appears to be reflected back upon itself. No other changes. _______________ IMPRESSION: Nasogastric tube tip at the EG junction. Telephone report was called to ICU staff at 8:45 AM on 6/8/2019. Xr Swallow Func Video Result Date: 6/7/2019 Barium speech study History: Dysphagia, cough. Barium pharyngogram performed under the guidance of speech therapist, with fluoroscopic guidance by Grisel Nguyen. Thin barium via cup, barium nectar via cup, and semisolid barium pudding administered. Fluoro Dose (reference air kerma): 15.17 mGy. Findings: Thin barium: Swallowing delay with premature spillage. Aspiration with cough reflex noted. There is residual in the vallecula and pyriform sinus.  Nectar: Oral and swallowing delay with premature spillage. No laryngeal penetration. Oral residuals along with residual in the vallecula and pyriform sinus. Pudding: Oral and swallowing delay with premature spillage. No laryngeal penetration. Oral residuals along with residual in the vallecula and pyriform sinus Suctioning of the oral residuals from the nectar and pudding performed. Impression: Abnormal examination as detailed above. Please refer to the full report from speech pathologist submitted separately. Xr Chest [facility] Result Date: 6/7/2019 A portable AP radiograph of the chest and abdomen was obtained at 1708 hours: INDICATION:  NG tube placement. COMPARISON:  Chest dated 6/3/2019. FINDINGS: NG/OG tube extends down to the GE junction and coils back up into the upper thoracic esophagus. Removal and reinsertion suggested. ABDOMEN: Mildly prominent bowel loops are present Heart and mediastinum: Mildly enlarged cardiac silhouette. Right-sided IJ line tip is near the cavoatrial junction. Lungs and pleura: Consolidation left base obscures the hemidiaphragm consistent with pleural effusion and adjacent infiltrate/atelectasis. Right lung is clear. Aorta: Unremarkable. Bones: Degenerative changes are seen throughout the spine and in both shoulders. Other: None. Impression: NG/OG tube extends down to the GE junction and coils back up into the upper thoracic esophagus. Reinsertion suggested. Consolidation left lung base obscuring the hemidiaphragm is likely due to pleural effusion and adjacent infiltrate/atelectasis. CRITICAL: Critical result given to nurse Lizette at [phone] hours by Dr. Law Beltran. Xr Abd Port  1 V Result Date: 6/7/2019 A portable AP radiograph of the chest and abdomen was obtained at 1708 hours: INDICATION:  NG tube placement. COMPARISON:  Chest dated 6/3/2019. FINDINGS: NG/OG tube extends down to the GE junction and coils back up into the upper thoracic esophagus. Removal and reinsertion suggested. ABDOMEN: Mildly prominent bowel loops are present Heart and mediastinum: Mildly enlarged cardiac silhouette. Right-sided IJ line tip is near the cavoatrial junction. Lungs and pleura: Consolidation left base obscures the hemidiaphragm consistent with pleural effusion and adjacent infiltrate/atelectasis. Right lung is clear. Aorta: Unremarkable. Bones: Degenerative changes are seen throughout the spine and in both shoulders. Other: None. Impression: NG/OG tube extends down to the GE junction and coils back up into the upper thoracic esophagus. Reinsertion suggested. Consolidation left lung base obscuring the hemidiaphragm is likely due to pleural effusion and adjacent infiltrate/atelectasis. CRITICAL: Critical result given to nurse Lizette at [phone] hours by Dr. Sarai Pulido. Medications Reviewed: 
Current Facility-Administered Medications Medication Dose Route Frequency  potassium chloride in water 10 mEq/100 mL IVPB premix pgbk  potassium, sodium phosphates (NEUTRA-PHOS) packet 1 Packet  1 Packet Per G Tube Q1H  
 0.9% sodium chloride infusion 250 mL  250 mL IntraVENous PRN  
 ELECTROLYTE REPLACEMENT PROTOCOL - Potassium Standard  Dosing Details for Fluid Restricted Patients  1 Each Other PRN  
 ELECTROLYTE REPLACEMENT PROTOCOL - Magnesium   1 Each Other PRN  
 ELECTROLYTE REPLACEMENT PROTOCOL - Phosphorus  Standard Dosing  1 Each Other PRN  
 ELECTROLYTE REPLACEMENT PROTOCOL - Calcium   1 Each Other PRN  polyethylene glycol (MIRALAX) packet 17 g  17 g Oral DAILY  VANCOMYCIN INFORMATION NOTE   Other ONCE  
 amLODIPine (NORVASC) tablet 10 mg  10 mg Oral DAILY  piperacillin-tazobactam (ZOSYN) 3.375 g in 0.9% sodium chloride (MBP/ADV) 100 mL \"EXTENDED 4 HOUR INFUSION###\"  3.375 g IntraVENous Q8H  
 insulin glargine (LANTUS) injection 12 Units  12 Units SubCUTAneous DAILY  traMADol (ULTRAM) tablet 50 mg  50 mg Oral Q8H PRN  
  nystatin (MYCOSTATIN) 100,000 unit/mL oral suspension 500,000 Units  500,000 Units Oral QID  insulin lispro (HUMALOG) injection   SubCUTAneous AC&HS  hydrALAZINE (APRESOLINE) 20 mg/mL injection 20 mg  20 mg IntraVENous Q4H PRN  
 dextrose 10% infusion 125-150 mL  125-150 mL IntraVENous PRN  
 acetaminophen (TYLENOL) tablet 650 mg  650 mg Oral Q4H PRN  
 metoprolol (LOPRESSOR) injection 5 mg  5 mg IntraVENous Q6H  
 sodium chloride (NS) flush 5-10 mL  5-10 mL IntraVENous PRN  
 heparin 25,000 units in D5W 250 ml infusion  18-36 Units/kg/hr IntraVENous TITRATE  levothyroxine (SYNTHROID) tablet 112 mcg  112 mcg Oral ACB  VANCOMYCIN INFORMATION NOTE 1 Each  1 Each Other Rx Dosing/Monitoring  glucose chewable tablet 16 g  4 Tab Oral PRN  
 glucagon (GLUCAGEN) injection 1 mg  1 mg IntraMUSCular PRN  pantoprazole (PROTONIX) injection 40 mg  40 mg IntraVENous DAILY Brenden Hatch PA-C [address] [place], [place] [address] Pager: [phone] Office: [phone]

## 2019-06-08 NOTE — PROGRESS NOTES
0730 Received report from off going RN Ev at the bedside covering SBAR, lines drips drains and plan. Patient continues therapuetic on heparin drip, unchanged. Blood glucose low at 65, given replacement. Potasium decreased, po to NG replacement given. Due for lab at 1600. Patient's son and POA had questions about prognosis and attended rounds and was able to have a meaningful discussion with Dr. Werner Velazquez. Patient transfused one unit RBC, no reaction noted. 1400 Patient has new order for head CT, to be performed in 20 minutes. 1630 Patient continues to have difficulty with maintaining normal range blood sugars. EKG performed. 1900 Report given to oncoming RN at the bedside inclusive of SBAR, lines drains drips and plan.

## 2019-06-08 NOTE — PROGRESS NOTES
1900: [facility] in bed quietly, side rails raised x3, bed in lowest position, VSS. 2000: Pt transferred to specialty bed. Pt following commands, answers yes/no questions, can tell you person and age but not situation or time. 2010: Pt's son at bedside wondering why she still has an altered mental status. Wants to discuss with doctors. 2100: Metoprolol held due to pt's HR=57. 
BP<170. 
 
0000: Pt placed on BiPAP. 0300: Metoprolol held due to HR=62. 
 
0700: Bedside and Verbal shift change report given to Jostin CALDERON (oncoming nurse) by Valerie Hines 
 (offgoing nurse). Report included the following information SBAR, Kardex, ED Summary, Intake/Output, Recent Results, Med Rec Status, Cardiac Rhythm msr and Alarm Parameters .

## 2019-06-08 NOTE — PROGRESS NOTES
Problem: Falls - Risk of 
Goal: *Absence of Falls Description Document Tania Braswell Fall Risk and appropriate interventions in the flowsheet. Outcome: Progressing Towards Goal 
  
Problem: Pressure Injury - Risk of 
Goal: *Prevention of pressure injury Description Document Tate Scale and appropriate interventions in the flowsheet. Outcome: Progressing Towards Goal 
  
Problem: Hypertension Goal: *Blood pressure within specified parameters Outcome: Progressing Towards Goal 
Goal: *Fluid volume balance Outcome: Progressing Towards Goal 
Goal: *Labs within defined limits Outcome: Progressing Towards Goal 
  
Problem: Diabetes Self-Management Goal: *Disease process and treatment process Description Define diabetes and identify own type of diabetes; list 3 options for treating diabetes. Outcome: Progressing Towards Goal 
Goal: *Incorporating nutritional management into lifestyle Description Describe effect of type, amount and timing of food on blood glucose; list 3 methods for planning meals. Outcome: Progressing Towards Goal 
Goal: *Incorporating physical activity into lifestyle Description State effect of exercise on blood glucose levels. Outcome: Progressing Towards Goal 
Goal: *Developing strategies to promote health/change behavior Description Define the ABC's of diabetes; identify appropriate screenings, schedule and personal plan for screenings. Outcome: Progressing Towards Goal 
Goal: *Using medications safely Description State effect of diabetes medications on diabetes; name diabetes medication taking, action and side effects. Outcome: Progressing Towards Goal 
Goal: *Monitoring blood glucose, interpreting and using results Description Identify recommended blood glucose targets  and personal targets. Outcome: Progressing Towards Goal 
Goal: *Prevention, detection, treatment of acute complications Description List symptoms of hyper- and hypoglycemia; describe how to treat low blood sugar and actions for lowering  high blood glucose level. Outcome: Progressing Towards Goal 
Goal: *Prevention, detection and treatment of chronic complications Description Define the natural course of diabetes and describe the relationship of blood glucose levels to long term complications of diabetes. Outcome: Progressing Towards Goal 
Goal: *Developing strategies to address psychosocial issues Description Describe feelings about living with diabetes; identify support needed and support network Outcome: Progressing Towards Goal 
Goal: *Sick day guidelines Outcome: Progressing Towards Goal 
Goal: *Patient Specific Goal (EDIT GOAL, INSERT TEXT) Outcome: Progressing Towards Goal 
  
Problem: Pain Goal: *Control of Pain Outcome: Progressing Towards Goal 
Goal: *PALLIATIVE CARE:  Alleviation of Pain Outcome: Progressing Towards Goal 
  
Problem: Tissue Perfusion - Cardiopulmonary, Altered Goal: *Optimize tissue perfusion Outcome: Progressing Towards Goal 
Goal: *Absence of hypoxia Outcome: Progressing Towards Goal 
  
Problem: Fluid Volume - Risk of, Imbalanced Goal: *Balanced intake and output Outcome: Progressing Towards Goal 
  
Problem: Patient Education: Go to Patient Education Activity Goal: Patient/Family Education Outcome: Progressing Towards Goal 
  
Problem: Discharge Planning Goal: *Discharge to safe environment Outcome: Progressing Towards Goal 
  
Problem: Gas Exchange - Impaired Goal: *Absence of hypoxia Outcome: Progressing Towards Goal 
  
Problem: Nutrition Deficit Goal: *Optimize nutritional status Outcome: Progressing Towards Goal 
  
Problem: General Wound Care Goal: *Non-infected wound: Improvement of existing wound, absence of infection, and maintenance of skin integrity Outcome: Progressing Towards Goal 
Goal: *Infected Wound: Prevention of further infection and promotion of healing Description Infection control procedures (eg: clean dressings, clean gloves, hand washing, precautions to isolate wound from contamination, sterile instruments used for wound debridement) should be implemented. Outcome: Progressing Towards Goal 
Goal: Interventions Outcome: Progressing Towards Goal 
  
Problem: Patient Education: Go to Patient Education Activity Goal: Patient/Family Education Outcome: Progressing Towards Goal 
  
Problem: Patient Education: Go to Patient Education Activity Goal: Patient/Family Education Outcome: Progressing Towards Goal 
  
Problem: Sepsis: Day 3 Goal: Off Pathway (Use only if patient is Off Pathway) Outcome: Progressing Towards Goal 
Goal: *Central Venous Pressure maintained at 8-12 mm Hg Outcome: Progressing Towards Goal 
Goal: *Oxygen saturation within defined limits Outcome: Progressing Towards Goal 
Goal: *Vital sign stability Outcome: Progressing Towards Goal 
Goal: *Tolerating diet Outcome: Progressing Towards Goal 
Goal: *Demonstrates progressive activity Outcome: Progressing Towards Goal 
Goal: Activity/Safety Outcome: Progressing Towards Goal 
Goal: Consults, if ordered Outcome: Progressing Towards Goal 
Goal: Diagnostic Test/Procedures Outcome: Progressing Towards Goal 
Goal: Nutrition/Diet Outcome: Progressing Towards Goal 
Goal: Discharge Planning Outcome: Progressing Towards Goal 
Goal: Medications Outcome: Progressing Towards Goal 
Goal: Respiratory Outcome: Progressing Towards Goal 
Goal: Treatments/Interventions/Procedures Outcome: Progressing Towards Goal 
Goal: Psychosocial 
Outcome: Progressing Towards Goal 
  
Problem: Sepsis: Day 4 Goal: Off Pathway (Use only if patient is Off Pathway) Outcome: Progressing Towards Goal 
Goal: Activity/Safety Outcome: Progressing Towards Goal 
Goal: Consults, if ordered Outcome: Progressing Towards Goal 
Goal: Diagnostic Test/Procedures Outcome: Progressing Towards Goal 
Goal: Nutrition/Diet Outcome: Progressing Towards Goal 
Goal: Discharge Planning Outcome: Progressing Towards Goal 
Goal: Medications Outcome: Progressing Towards Goal 
Goal: Respiratory Outcome: Progressing Towards Goal 
Goal: Treatments/Interventions/Procedures Outcome: Progressing Towards Goal 
Goal: Psychosocial 
Outcome: Progressing Towards Goal 
Goal: *Oxygen saturation within defined limits Outcome: Progressing Towards Goal 
Goal: *Hemodynamically stable Outcome: Progressing Towards Goal 
Goal: *Vital signs within defined limits Outcome: Progressing Towards Goal 
Goal: *Tolerating diet Outcome: Progressing Towards Goal 
Goal: *Demonstrates progressive activity Outcome: Progressing Towards Goal 
Goal: *Fluid volume maintenance Outcome: Progressing Towards Goal 
  
Problem: Sepsis: Day 5 Goal: Off Pathway (Use only if patient is Off Pathway) Outcome: Progressing Towards Goal 
Goal: *Oxygen saturation within defined limits Outcome: Progressing Towards Goal 
Goal: *Vital signs within defined limits Outcome: Progressing Towards Goal 
Goal: *Tolerating diet Outcome: Progressing Towards Goal 
Goal: *Demonstrates progressive activity Outcome: Progressing Towards Goal 
Goal: *Discharge plan identified Outcome: Progressing Towards Goal 
Goal: Activity/Safety Outcome: Progressing Towards Goal 
Goal: Consults, if ordered Outcome: Progressing Towards Goal 
Goal: Diagnostic Test/Procedures Outcome: Progressing Towards Goal 
Goal: Nutrition/Diet Outcome: Progressing Towards Goal 
Goal: Discharge Planning Outcome: Progressing Towards Goal 
Goal: Medications Outcome: Progressing Towards Goal 
Goal: Respiratory Outcome: Progressing Towards Goal 
Goal: Treatments/Interventions/Procedures Outcome: Progressing Towards Goal 
Goal: Psychosocial 
Outcome: Progressing Towards Goal 
  
Problem: Sepsis: Day 6 Goal: Off Pathway (Use only if patient is Off Pathway) Outcome: Progressing Towards Goal 
Goal: *Oxygen saturation within defined limits Outcome: Progressing Towards Goal 
 Goal: *Vital signs within defined limits Outcome: Progressing Towards Goal 
Goal: *Tolerating diet Outcome: Progressing Towards Goal 
Goal: *Demonstrates progressive activity Outcome: Progressing Towards Goal 
Goal: Influenza immunization Outcome: Progressing Towards Goal 
Goal: *Pneumococcal immunization Outcome: Progressing Towards Goal 
Goal: Activity/Safety Outcome: Progressing Towards Goal 
Goal: Diagnostic Test/Procedures Outcome: Progressing Towards Goal 
Goal: Nutrition/Diet Outcome: Progressing Towards Goal 
Goal: Discharge Planning Outcome: Progressing Towards Goal 
Goal: Medications Outcome: Progressing Towards Goal 
Goal: Respiratory Outcome: Progressing Towards Goal 
Goal: Treatments/Interventions/Procedures Outcome: Progressing Towards Goal 
Goal: Psychosocial 
Outcome: Progressing Towards Goal 
  
Problem: Sepsis: Discharge Outcomes Goal: *Vital signs within defined limits Outcome: Progressing Towards Goal 
Goal: *Tolerating diet Outcome: Progressing Towards Goal 
Goal: *Verbalizes understanding and describes prescribed diet Outcome: Progressing Towards Goal 
Goal: *Demonstrates progressive activity Outcome: Progressing Towards Goal 
Goal: *Describes follow-up/return visits to physicians Outcome: Progressing Towards Goal 
Goal: *Verbalizes name, dosage, time, side effects, and number of days to continue medications Outcome: Progressing Towards Goal 
Goal: *Influenza immunization (Oct-Mar only) Outcome: Progressing Towards Goal 
Goal: *Pneumococcal immunization Outcome: Progressing Towards Goal 
Goal: *Lungs clear or at baseline Outcome: Progressing Towards Goal 
Goal: *Oxygen saturation returns to baseline or 90% or better on room air Outcome: Progressing Towards Goal 
Goal: *Glycemic control Outcome: Progressing Towards Goal 
Goal: *Absence of deep venous thrombosis signs and symptoms(Stroke Metric) Outcome: Progressing Towards Goal 
 Goal: *Describes available resources and support systems Outcome: Progressing Towards Goal 
Goal: *Optimal pain control at patient's stated goal 
Outcome: Progressing Towards Goal

## 2019-06-08 NOTE — PROGRESS NOTES
Problem: Gas Exchange - Impaired Goal: *Absence of hypoxia Outcome: Progressing Towards Goal 
 Respiratory Therapy Assessment Care Plan Patient: 
Madelyn Aguilar [de-identified] y.o. female 6/8/2019 8:50 AM 
 
Hypoglycemia [E16.2] PAD (peripheral artery disease) ([facility] [address] [I73.9] Diabetes ([facility] [address] [E11.9] Chest X-RAY:  
Results from Hospital Encounter encounter on 06/03/19 XR ABD (KUB) Impression IMPRESSION: 
 
Nasogastric tube tip at the EG junction. Telephone report was called to ICU 
staff at 8:45 AM on 6/8/2019. XR CHEST PORT Impression Impression: 
 
NG/OG tube extends down to the GE junction and coils back up into the upper 
thoracic esophagus. Reinsertion suggested. Consolidation left lung base obscuring the hemidiaphragm is likely due to 
pleural effusion and adjacent infiltrate/atelectasis. CRITICAL: Critical result given to nurse Bauer at [phone] hours by Dr. Park Rutledge 81. XR ABD PORT  1 V Impression Impression: 
 
NG/OG tube extends down to the GE junction and coils back up into the upper 
thoracic esophagus. Reinsertion suggested. Consolidation left lung base obscuring the hemidiaphragm is likely due to 
pleural effusion and adjacent infiltrate/atelectasis. CRITICAL: Critical result given to nurse Lizette at [phone] hours by Dr. Park Rutledge 81. Vital Signs:   Visit Vitals /43 Pulse (!) 56 Temp 96.6 °F (35.9 °C) Resp 9 Ht 5' 5\" (1.651 m) Wt 113.4 kg (250 lb) SpO2 100% BMI 41.60 kg/m² Indications for treatment:  SOB hypercapnia Plan of care: Bipap as needed for sob Goal: wean off bipap as tolerated

## 2019-06-08 NOTE — PROGRESS NOTES
Pharmacy Dosing Services: Vancomycin Indication: Sepsis of unknown Origin / BSI? ? Day of therapy: 4 Other Antimicrobials (Include dose, start day & day of therapy): 
Pip/tazo 3.375 gm IV every 8 hours extended infusion Goal Vancomycin Level: 15-20 
(Trough 15-20 for most infections, 20 for meningitis/osteomyelitis, pre-HD level ~25) Vancomycin Level (if drawn):  
6/5 - 19.5 (23 hours post-dose) 6/7 - 29.7 (19.3 hours post dose) 6/7 - 26.6 (30 hours post dose) Significant Cultures:  
6/3 Blood - GPC - pending Renal function stable? (unstable defined as SCr increase of 0.5 mg/dL or > 50% increase from baseline, whichever is greater) (Y/N): N (increasing) CAPD, Hemodialysis or Renal Replacement Therapy (Y/N): N Recent Labs  
  19 
0430 19 
0453 19 
0210 CREA 1.81* 1.69* 1.55* BUN 41* 38* 33* WBC 13.0 17.2* 13.9* Temp (24hrs), Av.5 °F (36.4 °C), Min:96.4 °F (35.8 °C), Max:98.9 °F (37.2 °C) Creatinine Clearance (Creatinine Clearance (ml/min)): 31.3 ml/min Regimen assessment:  
- Renal function continues to decline and decreased urine output - Level only dropped by 3 ug/mL over 11.5 hrs - Will order another random level in 24 hrs Maintenance dose: Dosing per level Next scheduled level:  random  at 1500 Pharmacy will follow daily and adjust medications as appropriate for renal function and/or serum levels. Thank you, Halima Suárez Eth

## 2019-06-08 NOTE — PROGRESS NOTES
Pulmonary progress note History 47-year-old woman admitted to the hospital on 6/3/2019 with mental status changes secondary to hypoglycemia and hypercapnia. The hypoglycemia appears to have been secondary to an inadvertent overdose of glipizide. She uses BiPAP when sleeping. When on nasal cannula, she has at most mild CO2 retention. Her mental status according to her son has declined over the past few days. She is also more volume overloaded. The patient barely speaks above a whisper. Her son believes that this is also a steady worsening. He appreciates an increased respiratory effort, but this may be related to the volume retention. We have tried Albumin diuresis with both \"bolus diuretics\" and diuretic drips. Neither has significantly improved her clinically. Exam 
The patient apparently knows her birthday. She had leg pain and earlier received Ultram.  Earlier in the week, the patient appeared quite somnolent after receiving Ultram. 
Vitals: Afebrile. Pulse A. fib with occasional bradycardia. Blood pressure is typically mildly hypertensive. Respirations have been normal. 
No wheezing. Minimally coarse Regular and ear regular heart rhythm Abdomen soft Lower extremities are less erythematous. Edema has decreased. No cyanosis or clubbing According to the patient's son she is left-handed and not moving her left hand. Earlier in the week the staff noted an ability to move the left side Labs WBC 11.4 without bands. Hemoglobin 6.5 and has been steadily falling. No obvious blood loss. Platelets 943. aPTT 103 BUN 49 and creatinine 1.84. Albumin 3.3. VBG 7.31/48 Assessment Altered mental status Hypoglycemia Hypercapnia Volume overload Renal insufficiency Anemia of unclear etiology Peripheral arterial disease of both lower extremities on anticoagulation. Patient is not a surgical candidate Diabetes Question of left-sided weakness, acute Atrial fibrillation The patient's PTT has been supratherapeutic several times over the past week. Perhaps she has had a spontaneous intracranial hemorrhage. I discussed her clinical status in great length with her son. This included the possibility of needing lower extremity amputations in the near future given the poor blood supply. Given the arterial stenosis in its locations, the patient may very well need bilateral AKA's. Based on what she is said in the past, she may be reluctant to have any amputations performed. There is been no real discussion about nursing home care long-term. Plan CT scan of the brain without contrast 
Continue supplementing glucose Continue BiPAP as needed Recommend or consider ultrafiltration Consider transition to oral anticoagulation. We will continue IV anticoagulation until the decision is finally made on dialysis catheter. Stop ultram given hypersomnolence Patient is critically ill with organ failure. Total critical care time without physician overlap or procedure time included: 60 minutes.

## 2019-06-08 NOTE — PROGRESS NOTES
0010,Pt placed on BIPAP of 16/8 30% ,O2 sats,100% 0020 pt unable to tolerate CPT at this time morn and groan in pain. Sandra Sanabria 0410,CPT held pt resting comfortably with eyes closed

## 2019-06-08 NOTE — PROGRESS NOTES
Problem: Falls - Risk of 
Goal: *Absence of Falls Description Document Lydia Grande Fall Risk and appropriate interventions in the flowsheet. Outcome: Progressing Towards Goal 
  
Problem: Pressure Injury - Risk of 
Goal: *Prevention of pressure injury Description Document Tate Scale and appropriate interventions in the flowsheet. Outcome: Progressing Towards Goal 
  
Problem: Hypertension Goal: *Blood pressure within specified parameters Outcome: Progressing Towards Goal 
Goal: *Fluid volume balance Outcome: Progressing Towards Goal 
Goal: *Labs within defined limits Outcome: Progressing Towards Goal 
  
Problem: Diabetes Self-Management Goal: *Disease process and treatment process Description Define diabetes and identify own type of diabetes; list 3 options for treating diabetes. Outcome: Progressing Towards Goal 
Goal: *Incorporating nutritional management into lifestyle Description Describe effect of type, amount and timing of food on blood glucose; list 3 methods for planning meals. Outcome: Progressing Towards Goal 
Goal: *Incorporating physical activity into lifestyle Description State effect of exercise on blood glucose levels. Outcome: Progressing Towards Goal 
Goal: *Developing strategies to promote health/change behavior Description Define the ABC's of diabetes; identify appropriate screenings, schedule and personal plan for screenings. Outcome: Progressing Towards Goal 
Goal: *Using medications safely Description State effect of diabetes medications on diabetes; name diabetes medication taking, action and side effects. Outcome: Progressing Towards Goal 
Goal: *Monitoring blood glucose, interpreting and using results Description Identify recommended blood glucose targets  and personal targets. Outcome: Progressing Towards Goal 
Goal: *Prevention, detection, treatment of acute complications Description List symptoms of hyper- and hypoglycemia; describe how to treat low blood sugar and actions for lowering  high blood glucose level. Outcome: Progressing Towards Goal 
Goal: *Prevention, detection and treatment of chronic complications Description Define the natural course of diabetes and describe the relationship of blood glucose levels to long term complications of diabetes. Outcome: Progressing Towards Goal 
Goal: *Developing strategies to address psychosocial issues Description Describe feelings about living with diabetes; identify support needed and support network Outcome: Progressing Towards Goal 
Goal: *Sick day guidelines Outcome: Progressing Towards Goal 
Goal: *Patient Specific Goal (EDIT GOAL, INSERT TEXT) Outcome: Progressing Towards Goal 
  
Problem: Pain Goal: *Control of Pain Outcome: Progressing Towards Goal 
Goal: *PALLIATIVE CARE:  Alleviation of Pain Outcome: Progressing Towards Goal 
  
Problem: Tissue Perfusion - Cardiopulmonary, Altered Goal: *Optimize tissue perfusion Outcome: Progressing Towards Goal 
Goal: *Absence of hypoxia Outcome: Progressing Towards Goal 
  
Problem: Fluid Volume - Risk of, Imbalanced Goal: *Balanced intake and output Outcome: Progressing Towards Goal 
  
Problem: Patient Education: Go to Patient Education Activity Goal: Patient/Family Education Outcome: Progressing Towards Goal 
  
Problem: Discharge Planning Goal: *Discharge to safe environment Outcome: Progressing Towards Goal 
  
Problem: Gas Exchange - Impaired Goal: *Absence of hypoxia Outcome: Progressing Towards Goal 
  
Problem: Diabetes Maintenance:Admission Goal: *Blood glucose 80 to 180 mg/dl Outcome: Progressing Towards Goal 
  
Problem: Nutrition Deficit Goal: *Optimize nutritional status Outcome: Progressing Towards Goal 
  
Problem: General Wound Care Goal: *Non-infected wound: Improvement of existing wound, absence of infection, and maintenance of skin integrity Outcome: Progressing Towards Goal 
 Goal: *Infected Wound: Prevention of further infection and promotion of healing Description Infection control procedures (eg: clean dressings, clean gloves, hand washing, precautions to isolate wound from contamination, sterile instruments used for wound debridement) should be implemented. Outcome: Progressing Towards Goal 
Goal: Interventions Outcome: Progressing Towards Goal 
  
Problem: Patient Education: Go to Patient Education Activity Goal: Patient/Family Education Outcome: Progressing Towards Goal 
  
Problem: Patient Education: Go to Patient Education Activity Goal: Patient/Family Education Outcome: Progressing Towards Goal 
  
Problem: Sepsis: Day 3 Goal: Off Pathway (Use only if patient is Off Pathway) Outcome: Progressing Towards Goal 
Goal: *Central Venous Pressure maintained at 8-12 mm Hg Outcome: Progressing Towards Goal 
Goal: *Oxygen saturation within defined limits Outcome: Progressing Towards Goal 
Goal: *Vital sign stability Outcome: Progressing Towards Goal 
Goal: *Tolerating diet Outcome: Progressing Towards Goal 
Goal: *Demonstrates progressive activity Outcome: Progressing Towards Goal 
Goal: Activity/Safety Outcome: Progressing Towards Goal 
Goal: Consults, if ordered Outcome: Progressing Towards Goal 
Goal: Diagnostic Test/Procedures Outcome: Progressing Towards Goal 
Goal: Nutrition/Diet Outcome: Progressing Towards Goal 
Goal: Discharge Planning Outcome: Progressing Towards Goal 
Goal: Medications Outcome: Progressing Towards Goal 
Goal: Respiratory Outcome: Progressing Towards Goal 
Goal: Treatments/Interventions/Procedures Outcome: Progressing Towards Goal 
Goal: Psychosocial 
Outcome: Progressing Towards Goal 
  
Problem: Sepsis: Day 4 Goal: Off Pathway (Use only if patient is Off Pathway) Outcome: Progressing Towards Goal 
Goal: Activity/Safety Outcome: Progressing Towards Goal 
Goal: Consults, if ordered Outcome: Progressing Towards Goal 
 Goal: Diagnostic Test/Procedures Outcome: Progressing Towards Goal 
Goal: Nutrition/Diet Outcome: Progressing Towards Goal 
Goal: Discharge Planning Outcome: Progressing Towards Goal 
Goal: Medications Outcome: Progressing Towards Goal 
Goal: Respiratory Outcome: Progressing Towards Goal 
Goal: Treatments/Interventions/Procedures Outcome: Progressing Towards Goal 
Goal: Psychosocial 
Outcome: Progressing Towards Goal 
Goal: *Oxygen saturation within defined limits Outcome: Progressing Towards Goal 
Goal: *Hemodynamically stable Outcome: Progressing Towards Goal 
Goal: *Vital signs within defined limits Outcome: Progressing Towards Goal 
Goal: *Tolerating diet Outcome: Progressing Towards Goal 
Goal: *Demonstrates progressive activity Outcome: Progressing Towards Goal 
Goal: *Fluid volume maintenance Outcome: Progressing Towards Goal 
  
Problem: Patient Education: Go to Patient Education Activity Goal: Patient/Family Education Outcome: Progressing Towards Goal 
  
Problem: Patient Education: Go to Patient Education Activity Goal: Patient/Family Education Outcome: Progressing Towards Goal 
  
Problem: Patient Education: Go to Patient Education Activity Goal: Patient/Family Education Outcome: Progressing Towards Goal

## 2019-06-09 PROBLEM — G93.40 ACUTE ENCEPHALOPATHY: Status: ACTIVE | Noted: 2019-01-01

## 2019-06-09 NOTE — PROGRESS NOTES
[facility] ICU Progress Note Name: Vijay Carrion : 1940 MRN: [id number] Date: 2019 2:07 PM 
  
[x]I have reviewed the flowsheet and previous days notes. Events overnight reviewed and discussed with nursing staff. Vital signs and records reviewed. Subjective: 
19: 
 
S/p PRBC x 1 yesterday D-10 gtt began yesterday for hypoglycemia, despite TF's Head CT obtained yesterday Ultram D/c'd   
Requiring K+ replacement. Continue per protocol but will also add Lakeshia'd KCL Holding Lopressor given Davonna Zaina HR Poor response to Bumex IVP's with Rising Cr daily Norvasc held for 's. Will cut in half & restart tomorrow []The patient is unable to give any meaningful history or review of systems because the patient is: 
[]Intubated []Sedated  
[]Unresponsive []The patient is critically ill on     
[]Mechanical ventilation []Pressors []BiPAP [] ROS:A comprehensive review of systems was negative except for that written in the HPI. Medication Review: · Pressors - None · Sedation - None · Antibiotics - Zosyn, Vanco 
· Pain - APAP, PRN 
· GI/ DVT - Protonix IVP / Heparin gtt Vital Signs:   
Visit Vitals /54 Pulse 76 Temp 97.8 °F (36.6 °C) Resp 17 Ht 5' 5\" (1.651 m) Wt 117.5 kg (259 lb) SpO2 100% BMI 43.10 kg/m² O2 Device: Nasal cannula O2 Flow Rate (L/min): 3 l/min Temp (24hrs), Av.7 °F (36.5 °C), Min:97.4 °F (36.3 °C), Max:97.9 °F (36.6 °C) Intake/Output:  
Last shift:      701 -  1900 In: 287.2 [I.V.:127.2] Out: 65 [Urine:65] Last 3 shifts: 1901 -  0700 In: 2870.8 [I.V.:1910.8] Out: 1280 [JLNEP:0596] Intake/Output Summary (Last 24 hours) at 2019 1244 Last data filed at 2019 1100 Gross per 24 hour Intake 2155.57 ml Output 435 ml Net 1720.57 ml Physical Exam: 
 
General: Alert. [place] x 2   C/o back pain HEENT:  Anicteric sclerae; pink palpebral conjunctivae; mucosa moist 
Resp:  Symmetrical chest rise, no accessory muscle use. BS = B/L Coarse, decreased throughout (> L base). No rales/ wheezing/ rhonchi noted CV:  Afib, rate controlled. No m/g/r 
GI:  Abdomen soft, non-tender; (+) active bowel sounds Extremities:  +2 pulses on B/L UE's.  +2 pulses on B/L Groins. Absent Pulses on B/L LE's (DP/PT). Skin:  Warm; + Edema (> LE's) Neurologic:  Alert. [place] x 2. Moves B/L LE's & RUE to command. Not moving LUE (? Secondary to edema). Devices:  + NGT, Central line & Jackson. DATA:  
 
Current Facility-Administered Medications Medication Dose Route Frequency  potassium chloride (K-DUR, KLOR-CON) SR tablet 20 mEq  20 mEq Oral BID  furosemide (LASIX) 100 mg in 0.9% sodium chloride 100 mL infusion  15 mg/hr IntraVENous CONTINUOUS  
 0.9% sodium chloride infusion 250 mL  250 mL IntraVENous PRN  
 VANCOMYCIN INFORMATION NOTE   Other ONCE  
 dextrose 10% infusion  25 mL/hr IntraVENous CONTINUOUS  
 ELECTROLYTE REPLACEMENT PROTOCOL - Potassium Standard  Dosing Details for Fluid Restricted Patients  1 Each Other PRN  
 ELECTROLYTE REPLACEMENT PROTOCOL - Magnesium   1 Each Other PRN  
 ELECTROLYTE REPLACEMENT PROTOCOL - Phosphorus  Standard Dosing  1 Each Other PRN  
 ELECTROLYTE REPLACEMENT PROTOCOL - Calcium   1 Each Other PRN  polyethylene glycol (MIRALAX) packet 17 g  17 g Oral DAILY  amLODIPine (NORVASC) tablet 10 mg  10 mg Oral DAILY  piperacillin-tazobactam (ZOSYN) 3.375 g in 0.9% sodium chloride (MBP/ADV) 100 mL \"EXTENDED 4 HOUR INFUSION###\"  3.375 g IntraVENous Q8H  
 insulin glargine (LANTUS) injection 12 Units  12 Units SubCUTAneous DAILY  nystatin (MYCOSTATIN) 100,000 unit/mL oral suspension 500,000 Units  500,000 Units Oral QID  insulin lispro (HUMALOG) injection   SubCUTAneous AC&HS  hydrALAZINE (APRESOLINE) 20 mg/mL injection 20 mg  20 mg IntraVENous Q4H PRN  
  dextrose 10% infusion 125-150 mL  125-150 mL IntraVENous PRN  
 acetaminophen (TYLENOL) tablet 650 mg  650 mg Oral Q4H PRN  
 metoprolol (LOPRESSOR) injection 5 mg  5 mg IntraVENous Q6H  
 sodium chloride (NS) flush 5-10 mL  5-10 mL IntraVENous PRN  
 heparin 25,000 units in D5W 250 ml infusion  18-36 Units/kg/hr IntraVENous TITRATE  levothyroxine (SYNTHROID) tablet 112 mcg  112 mcg Oral ACB  VANCOMYCIN INFORMATION NOTE 1 Each  1 Each Other Rx Dosing/Monitoring  glucose chewable tablet 16 g  4 Tab Oral PRN  
 glucagon (GLUCAGEN) injection 1 mg  1 mg IntraMUSCular PRN  pantoprazole (PROTONIX) injection 40 mg  40 mg IntraVENous DAILY Labs: Results:  
   
Chemistry Recent Labs  
  06/09/19 
0440 06/08/19 
1630 06/08/19 
0400 GLU 89 75 64*  140 138  
K 3.1* 2.9* 3.1*  
 105 105 CO2 25 26 26 BUN 50* 49* 49* CREA 2.12* 2.10* 1.84* CA 8.2* 7.8* 8.4* AGAP 10 9 7 BUCR 24* 23* 27* ALB 3.6  --  3.3*  
  
CBC w/Diff Recent Labs  
  06/09/19 
0440 06/08/19 
0400 06/07/19 
0430 WBC 14.0* 11.4 13.0  
RBC 2.90* 2.37* 2.64* HGB 8.2* 6.5* 7.3* HCT 24.8* 20.3* 22.1*  
 177 194 GRANS 75* 73 86* LYMPH 13* 14* 7*  
EOS 2 1 0 Coagulation Recent Labs  
  06/09/19 
0440 06/08/19 
0400 APTT 107.7* 102.8* Liver Enzymes Recent Labs  
  06/09/19 
0440 ALB 3.6 ABG No results found for: PH, PHI, PCO2, PCO2I, PO2, PO2I, HCO3, HCO3I, FIO2, FIO2I Microbiology No results for input(s): CULT in the last 72 hours. Telemetry: []Sinus []A-flutter []Paced [x]A-fib []Multiple PVCs Imaging: 
None recently IMPRESSION:  
Altered mental status Hypercapnia still requiring BiPAP Gram-positive cocci bacteremia with identification pending Bilateral lower extremity cellulitis? Bilateral lower extremity peripheral arterial disease Lower extremity ischemia likely chronic Diffuse edema and volume overload Persistent hypoglycemia likely reflecting inadvertent glipizide overdose Hypervolemia hyponatremia still warranting diuresis Anemia of unclear etiology. Likely component of volume resuscitation Generalized weakness RECOMMENDATIONS:  
· Resp:  No recent PCXR or ABG. Tolerating N/c during the day & BiPAP qHS. 02 sat's high 90's. Continue pulmonary toilet (Vest Therapy q-4 hours) as hayden. Titrate FiO2/ supp O2 for SpO2 >90% · I/D: Afebrile. WBC trending up (14 K). +UTI via U/a (6/3/2019). + BCx (6/3 = contaminated), repeat BCx negative thus far. Continue ABX (Vanco / Zosyn) until final Cx's resulted · Hem/Onc: S/p 1 unit PRBC x 1 yesterday. Hct 24.8 this AM.  PLT's stable (195 K). .7 (0500). Daily CBC. PTT checks per Heparin gtt protocol. Continue Heparin gtt for now. Eventually will need PO A/C  
· CVS: Stable. No gtt's. Tele = Afib, rate controlled. Goal SBP < 170   Lopressor on hold secondary to low HR. Norvasc held this AM for SBP < 140 = cut in half & retime for tomorrow · Metabolic: Daily BMP; monitor e-lytes; replace PRN 
· Renal Following per Diuresis & Cr Mgmt: I > O's despite Bumex IVP's. Bumex IVP changed to lasix gtt. Cr rising daily (2.12 this AM). ?HD. Add Lakeshia'd KCL in addition to K+ replacement protocol. K+ 3.1 this AM.  Repeat K+ at 1600. Trend Renal indices; Diuresis, Jackson to BSD · Endocrine: D-10 gtt continues for Hypoglycemia. Will increase rate to 50 cc/ hr.  POC Glucose PRN Continue Synthroid for Thyroid d/o 
· GI: [place] TF's. + BM this AM.  Continue Miralax daily. Zofran PRN for N/V  
· Musc/Skin: Wound Care following for B/L LE's Mgmt · Neuro: Head CT obtained yesterday (negative). MRI of Brain (per Family request) timing per MD.  On exam, Alert & [place] x 2. C/o back pain = APAP PRN. Avoid Ultram, NSAIDS, & Narcotics. · Vascular Surgery following. No new imaging done & no intervention planned.   
· Fluids: D-10 gtt @ 50 cc /hr  
 · PT/OT following · To Be Discussed in Interdisciplinary Rounds (Continue ICU Care) Best Practices/ Safety Bundles: 
· Sepsis Bundle per Hospital Protocol · CAUTI Bundle · Electrolyte Replacement Bundle · Glycemic control goal <180; avoid Hypoglycemia · IHI ICU Bundles: 
·  Central Line Bundle Followed  and Lozoya Bundle Followed · Mech Vent patients: · VAP bundle, Aim to keep peak plateau pressure 12-31PO H2O 
· Aspiration Precautions - HOB >30' · Daily sedation holiday as indicated · SBT as tolerated/appropriate · Titrate FiO2 for SpO2 >94% · Aggressive Pulmonary Hygeiene · Stress ulcer prophylaxis. Protonix IVP · DVT prophylaxis. Heparin gtt · Need for Lines, lozoya assessed. · Restraints need. · Palliative care evaluation. The patient is: [x] acutely ill Risk of deterioration: [] moderate  
 [] critically ill  [x] high  
 
[x]See my orders for details My assessment/plan was discussed with: 
[x]Nursing []PT/OT   
[]Respiratory therapy []  
[]Family [] Critical Care time = 40 Minutes Cite Ross Gutierrez 
06/09/19 Pulmonary, Critical Care Medicine [address] [place] [address] [facility]

## 2019-06-09 NOTE — PROGRESS NOTES
Problem: Falls - Risk of 
Goal: *Absence of Falls Description Document Ava Monterroso Fall Risk and appropriate interventions in the flowsheet. Outcome: Progressing Towards Goal 
  
Problem: Pressure Injury - Risk of 
Goal: *Prevention of pressure injury Description Document Tate Scale and appropriate interventions in the flowsheet. Outcome: Progressing Towards Goal 
  
Problem: Hypertension Goal: *Blood pressure within specified parameters Outcome: Progressing Towards Goal 
Goal: *Fluid volume balance Outcome: Progressing Towards Goal 
Goal: *Labs within defined limits Outcome: Progressing Towards Goal 
  
Problem: Diabetes Self-Management Goal: *Disease process and treatment process Description Define diabetes and identify own type of diabetes; list 3 options for treating diabetes. Outcome: Progressing Towards Goal 
Goal: *Incorporating nutritional management into lifestyle Description Describe effect of type, amount and timing of food on blood glucose; list 3 methods for planning meals. Outcome: Progressing Towards Goal 
Goal: *Incorporating physical activity into lifestyle Description State effect of exercise on blood glucose levels. Outcome: Progressing Towards Goal 
Goal: *Developing strategies to promote health/change behavior Description Define the ABC's of diabetes; identify appropriate screenings, schedule and personal plan for screenings. Outcome: Progressing Towards Goal 
Goal: *Using medications safely Description State effect of diabetes medications on diabetes; name diabetes medication taking, action and side effects. Outcome: Progressing Towards Goal 
Goal: *Monitoring blood glucose, interpreting and using results Description Identify recommended blood glucose targets  and personal targets. Outcome: Progressing Towards Goal 
Goal: *Prevention, detection, treatment of acute complications Description List symptoms of hyper- and hypoglycemia; describe how to treat low blood sugar and actions for lowering  high blood glucose level. Outcome: Progressing Towards Goal 
Goal: *Prevention, detection and treatment of chronic complications Description Define the natural course of diabetes and describe the relationship of blood glucose levels to long term complications of diabetes. Outcome: Progressing Towards Goal 
Goal: *Developing strategies to address psychosocial issues Description Describe feelings about living with diabetes; identify support needed and support network Outcome: Progressing Towards Goal 
Goal: *Sick day guidelines Outcome: Progressing Towards Goal 
Goal: *Patient Specific Goal (EDIT GOAL, INSERT TEXT) Outcome: Progressing Towards Goal 
  
Problem: Pain Goal: *Control of Pain Outcome: Progressing Towards Goal 
Goal: *PALLIATIVE CARE:  Alleviation of Pain Outcome: Progressing Towards Goal 
  
Problem: Tissue Perfusion - Cardiopulmonary, Altered Goal: *Optimize tissue perfusion Outcome: Progressing Towards Goal 
Goal: *Absence of hypoxia Outcome: Progressing Towards Goal 
  
Problem: Fluid Volume - Risk of, Imbalanced Goal: *Balanced intake and output Outcome: Progressing Towards Goal 
  
Problem: Patient Education: Go to Patient Education Activity Goal: Patient/Family Education Outcome: Progressing Towards Goal 
  
Problem: Discharge Planning Goal: *Discharge to safe environment Outcome: Progressing Towards Goal 
  
Problem: Gas Exchange - Impaired Goal: *Absence of hypoxia Outcome: Progressing Towards Goal 
  
Problem: Nutrition Deficit Goal: *Optimize nutritional status Outcome: Progressing Towards Goal 
  
Problem: General Wound Care Goal: *Non-infected wound: Improvement of existing wound, absence of infection, and maintenance of skin integrity Outcome: Progressing Towards Goal 
Goal: *Infected Wound: Prevention of further infection and promotion of healing Description Infection control procedures (eg: clean dressings, clean gloves, hand washing, precautions to isolate wound from contamination, sterile instruments used for wound debridement) should be implemented. Outcome: Progressing Towards Goal 
Goal: Interventions Outcome: Progressing Towards Goal 
  
Problem: Patient Education: Go to Patient Education Activity Goal: Patient/Family Education Outcome: Progressing Towards Goal 
  
Problem: Patient Education: Go to Patient Education Activity Goal: Patient/Family Education Outcome: Progressing Towards Goal 
  
Problem: Sepsis: Day 3 Goal: Off Pathway (Use only if patient is Off Pathway) Outcome: Progressing Towards Goal 
Goal: *Central Venous Pressure maintained at 8-12 mm Hg Outcome: Progressing Towards Goal 
Goal: *Oxygen saturation within defined limits Outcome: Progressing Towards Goal 
Goal: *Vital sign stability Outcome: Progressing Towards Goal 
Goal: *Tolerating diet Outcome: Progressing Towards Goal 
Goal: *Demonstrates progressive activity Outcome: Progressing Towards Goal 
Goal: Activity/Safety Outcome: Progressing Towards Goal 
Goal: Consults, if ordered Outcome: Progressing Towards Goal 
Goal: Diagnostic Test/Procedures Outcome: Progressing Towards Goal 
Goal: Nutrition/Diet Outcome: Progressing Towards Goal 
Goal: Discharge Planning Outcome: Progressing Towards Goal 
Goal: Medications Outcome: Progressing Towards Goal 
Goal: Respiratory Outcome: Progressing Towards Goal 
Goal: Treatments/Interventions/Procedures Outcome: Progressing Towards Goal 
Goal: Psychosocial 
Outcome: Progressing Towards Goal 
  
Problem: Sepsis: Day 4 Goal: Off Pathway (Use only if patient is Off Pathway) Outcome: Progressing Towards Goal 
Goal: Activity/Safety Outcome: Progressing Towards Goal 
Goal: Consults, if ordered Outcome: Progressing Towards Goal 
Goal: Diagnostic Test/Procedures Outcome: Progressing Towards Goal 
Goal: Nutrition/Diet Outcome: Progressing Towards Goal 
Goal: Discharge Planning Outcome: Progressing Towards Goal 
Goal: Medications Outcome: Progressing Towards Goal 
Goal: Respiratory Outcome: Progressing Towards Goal 
Goal: Treatments/Interventions/Procedures Outcome: Progressing Towards Goal 
Goal: Psychosocial 
Outcome: Progressing Towards Goal 
Goal: *Oxygen saturation within defined limits Outcome: Progressing Towards Goal 
Goal: *Hemodynamically stable Outcome: Progressing Towards Goal 
Goal: *Vital signs within defined limits Outcome: Progressing Towards Goal 
Goal: *Tolerating diet Outcome: Progressing Towards Goal 
Goal: *Demonstrates progressive activity Outcome: Progressing Towards Goal 
Goal: *Fluid volume maintenance Outcome: Progressing Towards Goal 
  
Problem: Sepsis: Day 5 Goal: Off Pathway (Use only if patient is Off Pathway) Outcome: Progressing Towards Goal 
Goal: *Oxygen saturation within defined limits Outcome: Progressing Towards Goal 
Goal: *Vital signs within defined limits Outcome: Progressing Towards Goal 
Goal: *Tolerating diet Outcome: Progressing Towards Goal 
Goal: *Demonstrates progressive activity Outcome: Progressing Towards Goal 
Goal: *Discharge plan identified Outcome: Progressing Towards Goal 
Goal: Activity/Safety Outcome: Progressing Towards Goal 
Goal: Consults, if ordered Outcome: Progressing Towards Goal 
Goal: Diagnostic Test/Procedures Outcome: Progressing Towards Goal 
Goal: Nutrition/Diet Outcome: Progressing Towards Goal 
Goal: Discharge Planning Outcome: Progressing Towards Goal 
Goal: Medications Outcome: Progressing Towards Goal 
Goal: Respiratory Outcome: Progressing Towards Goal 
Goal: Treatments/Interventions/Procedures Outcome: Progressing Towards Goal 
Goal: Psychosocial 
Outcome: Progressing Towards Goal 
  
Problem: Sepsis: Day 6 Goal: Off Pathway (Use only if patient is Off Pathway) Outcome: Progressing Towards Goal 
Goal: *Oxygen saturation within defined limits Outcome: Progressing Towards Goal 
 Goal: *Vital signs within defined limits Outcome: Progressing Towards Goal 
Goal: *Tolerating diet Outcome: Progressing Towards Goal 
Goal: *Demonstrates progressive activity Outcome: Progressing Towards Goal 
Goal: Influenza immunization Outcome: Progressing Towards Goal 
Goal: *Pneumococcal immunization Outcome: Progressing Towards Goal 
Goal: Activity/Safety Outcome: Progressing Towards Goal 
Goal: Diagnostic Test/Procedures Outcome: Progressing Towards Goal 
Goal: Nutrition/Diet Outcome: Progressing Towards Goal 
Goal: Discharge Planning Outcome: Progressing Towards Goal 
Goal: Medications Outcome: Progressing Towards Goal 
Goal: Respiratory Outcome: Progressing Towards Goal 
Goal: Treatments/Interventions/Procedures Outcome: Progressing Towards Goal 
Goal: Psychosocial 
Outcome: Progressing Towards Goal 
  
Problem: Sepsis: Discharge Outcomes Goal: *Vital signs within defined limits Outcome: Progressing Towards Goal 
Goal: *Tolerating diet Outcome: Progressing Towards Goal 
Goal: *Verbalizes understanding and describes prescribed diet Outcome: Progressing Towards Goal 
Goal: *Demonstrates progressive activity Outcome: Progressing Towards Goal 
Goal: *Describes follow-up/return visits to physicians Outcome: Progressing Towards Goal 
Goal: *Verbalizes name, dosage, time, side effects, and number of days to continue medications Outcome: Progressing Towards Goal 
Goal: *Influenza immunization (Oct-Mar only) Outcome: Progressing Towards Goal 
Goal: *Pneumococcal immunization Outcome: Progressing Towards Goal 
Goal: *Lungs clear or at baseline Outcome: Progressing Towards Goal 
Goal: *Oxygen saturation returns to baseline or 90% or better on room air Outcome: Progressing Towards Goal 
Goal: *Glycemic control Outcome: Progressing Towards Goal 
Goal: *Absence of deep venous thrombosis signs and symptoms(Stroke Metric) Outcome: Progressing Towards Goal 
 Goal: *Describes available resources and support systems Outcome: Progressing Towards Goal 
Goal: *Optimal pain control at patient's stated goal 
Outcome: Progressing Towards Goal

## 2019-06-09 NOTE — PROGRESS NOTES
Problem: Mobility Impaired (Adult and Pediatric) Goal: *Acute Goals and Plan of Care (Insert Text) Description Physical Therapy Goals Initiated 6/6/2019 and to be accomplished within 7 days. 1.  Patient will complete all bed mobility with moderate assistance  in order to prepare for EOB/OOB activity. 2.  Patient will tolerate sitting EOB x 10 minutes in order to safely participate with PT.  
3.  Patient will perform sit <> stand with maximal assistance in order to prepare for OOB/gait activity. 4.  Patient will perform bed to chair transfers with maximal assistance in order to promote mobility and encourage seated activity to progress towards their prior level of function. 5.  Patient will participate in 10 minutes of B LE exercise/AROM with moderate assistance . Outcome: Not Progressing Towards Goal 
 PHYSICAL THERAPY TREATMENT Patient: Kerline Garcia (45 y.o. female) Date: 6/9/2019 Diagnosis: Hypoglycemia [E16.2] PAD (peripheral artery disease) ([facility] [address] [I73.9] Diabetes ([facility] [address] [E11.9] Sepsis ([facility] [address] Precautions: Fall ASSESSMENT: 
Pt demonstrating decreased tolerance for treatment today, unable to demonstrate active motion in LE's, poor tolerance to PROM exercises. Progression toward goals:  
?      Improving appropriately and progressing toward goals ? Improving slowly and progressing toward goals ? Not making progress toward goals and plan of care will be adjusted PLAN: 
Patient continues to benefit from skilled intervention to address the above impairments. Continue treatment per established plan of care. Discharge Recommendations:  Jack Ventura Further Equipment Recommendations for Discharge:  TBD at next level of care SUBJECTIVE:  
Patient stated ? My hip hurts. ? OBJECTIVE DATA SUMMARY:  
Critical Behavior: 
Neurologic State: Lethargic, Confused Orientation Level: Oriented to person Cognition: Decreased command following Safety/Judgement: Insight into deficits Functional Mobility Training: 
Therapeutic Exercises: PROM of LE's 
 
 
 
Pain: 
Pain level pre-treatment: not rated/10 Pain level post-treatment: not rated/10 Pain Intervention(s): positioning Activity Tolerance:  
Poor Please refer to the flowsheet for vital signs taken during this treatment. After treatment:  
? Patient left in no apparent distress sitting up in chair ? Patient left in no apparent distress in bed 
? Call bell left within reach ? Nursing notified ? Caregiver present ? Bed alarm activated ? SCDs applied COMMUNICATION/EDUCATION:  
?          
? Fall prevention education was provided and the patient/caregiver indicated understanding. ? Patient/family have participated as able in working toward goals and plan of care. ?         Patient/family agree to work toward stated goals and plan of care. ?         Patient understands intent and goals of therapy, but is neutral about his/her participation. ? Patient is unable to participate in stated goals/plan of care: ongoing with therapy staff. ?         Role of Physical Therapy in the acute care setting. Nelda Donaldson, PTA Time Calculation: 14 mins

## 2019-06-09 NOTE — PROGRESS NOTES
Problem: Falls - Risk of 
Goal: *Absence of Falls Description Document Yomi Edward Fall Risk and appropriate interventions in the flowsheet. 6/9/2019 0756 by Jensen Sandhu RN Outcome: Progressing Towards Goal 
6/8/2019 1829 by Jensen Sandhu RN Outcome: Progressing Towards Goal 
  
Problem: Pressure Injury - Risk of 
Goal: *Prevention of pressure injury Description Document Tate Scale and appropriate interventions in the flowsheet. 6/9/2019 0756 by Jensen Sandhu RN Outcome: Progressing Towards Goal 
6/8/2019 1829 by Jensen Sandhu RN Outcome: Progressing Towards Goal 
  
Problem: Hypertension Goal: *Blood pressure within specified parameters 6/9/2019 0756 by Jensen Sandhu RN Outcome: Progressing Towards Goal 
6/8/2019 1829 by Jensen Sandhu RN Outcome: Progressing Towards Goal 
Goal: *Fluid volume balance 6/9/2019 0756 by Jensen Snadhu RN Outcome: Progressing Towards Goal 
6/8/2019 1829 by Jensen Sandhu RN Outcome: Progressing Towards Goal 
Goal: *Labs within defined limits 6/9/2019 0756 by Jensen Sandhu RN Outcome: Progressing Towards Goal 
6/8/2019 1829 by Jensen Sandhu RN Outcome: Progressing Towards Goal 
  
Problem: Diabetes Self-Management Goal: *Disease process and treatment process Description Define diabetes and identify own type of diabetes; list 3 options for treating diabetes. 6/9/2019 0756 by Jensen Sandhu RN Outcome: Progressing Towards Goal 
6/8/2019 1829 by Jensen Sandhu RN Outcome: Progressing Towards Goal 
Goal: *Incorporating nutritional management into lifestyle Description Describe effect of type, amount and timing of food on blood glucose; list 3 methods for planning meals. 6/9/2019 0756 by Jensen Sandhu RN Outcome: Progressing Towards Goal 
6/8/2019 1829 by Jensen Sandhu RN Outcome: Progressing Towards Goal 
Goal: *Incorporating physical activity into lifestyle Description State effect of exercise on blood glucose levels. 6/9/2019 0756 by Jackelin Hutson RN Outcome: Progressing Towards Goal 
6/8/2019 1829 by Jackelin Hutson RN Outcome: Progressing Towards Goal 
Goal: *Developing strategies to promote health/change behavior Description Define the ABC's of diabetes; identify appropriate screenings, schedule and personal plan for screenings. 6/9/2019 0756 by Jackelin Hutson RN Outcome: Progressing Towards Goal 
6/8/2019 1829 by Jackelin Hutson RN Outcome: Progressing Towards Goal 
Goal: *Using medications safely Description State effect of diabetes medications on diabetes; name diabetes medication taking, action and side effects. 6/9/2019 0756 by Jackelin Hutson RN Outcome: Progressing Towards Goal 
6/8/2019 1829 by Jackelin Hutson RN Outcome: Progressing Towards Goal 
Goal: *Monitoring blood glucose, interpreting and using results Description Identify recommended blood glucose targets  and personal targets. 6/9/2019 0756 by Jackelin Hutson RN Outcome: Progressing Towards Goal 
6/8/2019 1829 by Jackelin Hutson RN Outcome: Progressing Towards Goal 
Goal: *Prevention, detection, treatment of acute complications Description List symptoms of hyper- and hypoglycemia; describe how to treat low blood sugar and actions for lowering  high blood glucose level. 6/9/2019 0756 by Jackelin Hutson RN Outcome: Progressing Towards Goal 
6/8/2019 1829 by Jackelin Hutson RN Outcome: Progressing Towards Goal 
Goal: *Prevention, detection and treatment of chronic complications Description Define the natural course of diabetes and describe the relationship of blood glucose levels to long term complications of diabetes. 6/9/2019 0756 by Jackelin Hutson RN Outcome: Progressing Towards Goal 
6/8/2019 1829 by Jackelin Hutson RN Outcome: Progressing Towards Goal 
Goal: *Developing strategies to address psychosocial issues Description Describe feelings about living with diabetes; identify support needed and support network 6/9/2019 0756 by Shawn Donaldson RN Outcome: Progressing Towards Goal 
6/8/2019 1829 by Shawn Donaldson RN Outcome: Progressing Towards Goal 
Goal: *Sick day guidelines 6/9/2019 0756 by Shawn Donaldson RN Outcome: Progressing Towards Goal 
6/8/2019 1829 by Shawn Donaldson RN Outcome: Progressing Towards Goal 
Goal: *Patient Specific Goal (EDIT GOAL, INSERT TEXT) 
6/9/2019 0756 by Shawn Donaldson RN Outcome: Progressing Towards Goal 
6/8/2019 1829 by Shawn Donaldson RN Outcome: Progressing Towards Goal 
  
Problem: Pain Goal: *Control of Pain 6/9/2019 0756 by Shawn Donaldson RN Outcome: Progressing Towards Goal 
6/8/2019 1829 by Shawn Donaldson RN Outcome: Progressing Towards Goal 
Goal: *PALLIATIVE CARE:  Alleviation of Pain 6/9/2019 0756 by hSawn Donaldson RN Outcome: Progressing Towards Goal 
6/8/2019 1829 by Shawn Donaldson RN Outcome: Progressing Towards Goal 
  
Problem: Tissue Perfusion - Cardiopulmonary, Altered Goal: *Optimize tissue perfusion 6/9/2019 0756 by Shawn Donaldson RN Outcome: Progressing Towards Goal 
6/8/2019 1829 by Shawn Donaldson RN Outcome: Progressing Towards Goal 
Goal: *Absence of hypoxia 6/9/2019 0756 by Shawn Donaldson RN Outcome: Progressing Towards Goal 
6/8/2019 1829 by Shawn Donaldson RN Outcome: Progressing Towards Goal 
  
Problem: Fluid Volume - Risk of, Imbalanced Goal: *Balanced intake and output 6/9/2019 0756 by Shawn Donaldson RN Outcome: Progressing Towards Goal 
6/8/2019 1829 by Shawn Donaldson RN Outcome: Progressing Towards Goal 
  
Problem: Patient Education: Go to Patient Education Activity Goal: Patient/Family Education 6/9/2019 0756 by Shawn Donaldson RN Outcome: Progressing Towards Goal 
6/8/2019 1829 by Shawn Donaldson RN Outcome: Progressing Towards Goal 
  
Problem: Discharge Planning Goal: *Discharge to safe environment 6/9/2019 0756 by Shawn Donaldson RN Outcome: Progressing Towards Goal 
6/8/2019 1829 by Shawn Donaldson RN 
 Outcome: Progressing Towards Goal 
  
Problem: Gas Exchange - Impaired Goal: *Absence of hypoxia 6/9/2019 0756 by Steffanie Oneill RN Outcome: Progressing Towards Goal 
6/8/2019 1829 by Steffanie Oneill RN Outcome: Progressing Towards Goal 
  
Problem: Nutrition Deficit Goal: *Optimize nutritional status 6/9/2019 0756 by Steffanie Oneill RN Outcome: Progressing Towards Goal 
6/8/2019 1829 by Steffanie Oneill RN Outcome: Progressing Towards Goal 
  
Problem: General Wound Care Goal: *Non-infected wound: Improvement of existing wound, absence of infection, and maintenance of skin integrity 6/9/2019 0756 by Steffanie Oneill RN Outcome: Progressing Towards Goal 
6/8/2019 1829 by Steffanie Oneill RN Outcome: Progressing Towards Goal 
Goal: *Infected Wound: Prevention of further infection and promotion of healing Description Infection control procedures (eg: clean dressings, clean gloves, hand washing, precautions to isolate wound from contamination, sterile instruments used for wound debridement) should be implemented. 6/9/2019 0756 by Steffanie Oneill RN Outcome: Progressing Towards Goal 
6/8/2019 1829 by Steffanie Oneill RN Outcome: Progressing Towards Goal 
Goal: Interventions 6/9/2019 0756 by Steffanie Oneill RN Outcome: Progressing Towards Goal 
6/8/2019 1829 by Steffanie Oneill RN Outcome: Progressing Towards Goal 
  
Problem: Patient Education: Go to Patient Education Activity Goal: Patient/Family Education 6/9/2019 0756 by Steffanie Oneill RN Outcome: Progressing Towards Goal 
6/8/2019 1829 by Steffanie Oneill RN Outcome: Progressing Towards Goal 
  
Problem: Patient Education: Go to Patient Education Activity Goal: Patient/Family Education 6/9/2019 0756 by Steffanie Oneill RN Outcome: Progressing Towards Goal 
6/8/2019 1829 by Steffanie Oneill RN Outcome: Progressing Towards Goal 
  
Problem: Sepsis: Day 3 Goal: Off Pathway (Use only if patient is Off Pathway) 6/9/2019 0756 by Steffanie Oneill RN 
 Outcome: Progressing Towards Goal 
6/8/2019 1829 by Shannan Mena RN Outcome: Progressing Towards Goal 
Goal: *Central Venous Pressure maintained at 8-12 mm Hg 
6/9/2019 0756 by Shannan Mena RN Outcome: Progressing Towards Goal 
6/8/2019 1829 by Shannan Mena RN Outcome: Progressing Towards Goal 
Goal: *Oxygen saturation within defined limits 6/9/2019 0756 by Shannan Mena RN Outcome: Progressing Towards Goal 
6/8/2019 1829 by Shannan Mena RN Outcome: Progressing Towards Goal 
Goal: *Vital sign stability 6/9/2019 0756 by Shannan Mena RN Outcome: Progressing Towards Goal 
6/8/2019 1829 by Shannan Mena RN Outcome: Progressing Towards Goal 
Goal: *Tolerating diet 6/9/2019 0756 by Shannan Mena RN Outcome: Progressing Towards Goal 
6/8/2019 1829 by Shannan Mena RN Outcome: Progressing Towards Goal 
Goal: *Demonstrates progressive activity 6/9/2019 0756 by Shannan Mena RN Outcome: Progressing Towards Goal 
6/8/2019 1829 by Shannan Mena RN Outcome: Progressing Towards Goal 
Goal: Activity/Safety 6/9/2019 0756 by Shannan Mena RN Outcome: Progressing Towards Goal 
6/8/2019 1829 by Shannan Mena RN Outcome: Progressing Towards Goal 
Goal: Consults, if ordered 6/9/2019 0756 by Shannan Mena RN Outcome: Progressing Towards Goal 
6/8/2019 1829 by Shannan Mena RN Outcome: Progressing Towards Goal 
Goal: Diagnostic Test/Procedures 6/9/2019 0756 by Shannan Mena RN Outcome: Progressing Towards Goal 
6/8/2019 1829 by Shannan Mena RN Outcome: Progressing Towards Goal 
Goal: Nutrition/Diet 6/9/2019 0756 by Shannan Mena RN Outcome: Progressing Towards Goal 
6/8/2019 1829 by Shannan Mena RN Outcome: Progressing Towards Goal 
Goal: Discharge Planning 6/9/2019 0756 by Shannan Mena RN Outcome: Progressing Towards Goal 
6/8/2019 1829 by Shannan Mena RN Outcome: Progressing Towards Goal 
Goal: Medications 6/9/2019 0756 by Shannan Mena RN 
 Outcome: Progressing Towards Goal 
6/8/2019 1829 by Geo Matt, RN Outcome: Progressing Towards Goal 
Goal: Respiratory 6/9/2019 0756 by Geo Matt, RN Outcome: Progressing Towards Goal 
6/8/2019 1829 by Geo Matt, RN Outcome: Progressing Towards Goal 
Goal: Treatments/Interventions/Procedures 6/9/2019 0756 by Geo Matt, RN Outcome: Progressing Towards Goal 
6/8/2019 1829 by Geo Matt, RN Outcome: Progressing Towards Goal 
Goal: Psychosocial 
6/9/2019 0756 by Geo Matt, RN Outcome: Progressing Towards Goal 
6/8/2019 1829 by Geo Matt, RN Outcome: Progressing Towards Goal 
  
Problem: Sepsis: Day 4 Goal: Off Pathway (Use only if patient is Off Pathway) 6/9/2019 0756 by Geo Matt, RN Outcome: Progressing Towards Goal 
6/8/2019 1829 by Geo Matt, RN Outcome: Progressing Towards Goal 
Goal: Activity/Safety 6/9/2019 0756 by Geo Matt, RN Outcome: Progressing Towards Goal 
6/8/2019 1829 by Geo Matt, RN Outcome: Progressing Towards Goal 
Goal: Consults, if ordered 6/9/2019 0756 by Geo Matt, RN Outcome: Progressing Towards Goal 
6/8/2019 1829 by Geo Matt, RN Outcome: Progressing Towards Goal 
Goal: Diagnostic Test/Procedures 6/9/2019 0756 by Geo Matt, RN Outcome: Progressing Towards Goal 
6/8/2019 1829 by Geo Matt, RN Outcome: Progressing Towards Goal 
Goal: Nutrition/Diet 6/9/2019 0756 by Geo Matt, RN Outcome: Progressing Towards Goal 
6/8/2019 1829 by Geo Matt, RN Outcome: Progressing Towards Goal 
Goal: Discharge Planning 6/9/2019 0756 by Geo Matt, RN Outcome: Progressing Towards Goal 
6/8/2019 1829 by Geo Matt, RN Outcome: Progressing Towards Goal 
Goal: Medications 6/9/2019 0756 by Geo Matt, RN Outcome: Progressing Towards Goal 
6/8/2019 1829 by Geo Matt, RN Outcome: Progressing Towards Goal 
Goal: Respiratory 6/9/2019 0756 by Geo Matt, RN 
 Outcome: Progressing Towards Goal 
6/8/2019 1829 by Princess Baron RN Outcome: Progressing Towards Goal 
Goal: Treatments/Interventions/Procedures 6/9/2019 0756 by Princess Baron RN Outcome: Progressing Towards Goal 
6/8/2019 1829 by Princess Baron RN Outcome: Progressing Towards Goal 
Goal: Psychosocial 
6/9/2019 0756 by Princess Baron RN Outcome: Progressing Towards Goal 
6/8/2019 1829 by Princess Baron RN Outcome: Progressing Towards Goal 
Goal: *Oxygen saturation within defined limits 6/9/2019 0756 by Princess Baron RN Outcome: Progressing Towards Goal 
6/8/2019 1829 by Princess Baron RN Outcome: Progressing Towards Goal 
Goal: *Hemodynamically stable 6/9/2019 0756 by Princess Baron RN Outcome: Progressing Towards Goal 
6/8/2019 1829 by Princess Baron RN Outcome: Progressing Towards Goal 
Goal: *Vital signs within defined limits 6/9/2019 0756 by Princess Baron RN Outcome: Progressing Towards Goal 
6/8/2019 1829 by Princess Baron RN Outcome: Progressing Towards Goal 
Goal: *Tolerating diet 6/9/2019 0756 by Princess Barno RN Outcome: Progressing Towards Goal 
6/8/2019 1829 by Princess Baron RN Outcome: Progressing Towards Goal 
Goal: *Demonstrates progressive activity 6/9/2019 0756 by Princess Baron RN Outcome: Progressing Towards Goal 
6/8/2019 1829 by Princess Baron RN Outcome: Progressing Towards Goal 
Goal: *Fluid volume maintenance 6/9/2019 0756 by Princess Baron RN Outcome: Progressing Towards Goal 
6/8/2019 1829 by Princess Braon RN Outcome: Progressing Towards Goal 
  
Problem: Sepsis: Day 5 Goal: Off Pathway (Use only if patient is Off Pathway) 6/9/2019 0756 by Princess Baron RN Outcome: Progressing Towards Goal 
6/8/2019 1829 by Princess Baron RN Outcome: Progressing Towards Goal 
Goal: *Oxygen saturation within defined limits 6/9/2019 0756 by Princess Baron RN Outcome: Progressing Towards Goal 
6/8/2019 1829 by Princess Baron RN 
 Outcome: Progressing Towards Goal 
Goal: *Vital signs within defined limits 6/9/2019 0756 by Tobias Nyhan, RN Outcome: Progressing Towards Goal 
6/8/2019 1829 by Tobias Nyhan, RN Outcome: Progressing Towards Goal 
Goal: *Tolerating diet 6/9/2019 0756 by Tobias Nyhan, RN Outcome: Progressing Towards Goal 
6/8/2019 1829 by Tobias Nyhan, RN Outcome: Progressing Towards Goal 
Goal: *Demonstrates progressive activity 6/9/2019 0756 by Tobias Nyhan, RN Outcome: Progressing Towards Goal 
6/8/2019 1829 by Tobias Nyhan, RN Outcome: Progressing Towards Goal 
Goal: *Discharge plan identified 6/9/2019 0756 by Tobias Nyhan, RN Outcome: Progressing Towards Goal 
6/8/2019 1829 by Tobias Nyhan, RN Outcome: Progressing Towards Goal 
Goal: Activity/Safety 6/9/2019 0756 by Tobias Nyhan, RN Outcome: Progressing Towards Goal 
6/8/2019 1829 by Tobias Nyhan, RN Outcome: Progressing Towards Goal 
Goal: Consults, if ordered 6/9/2019 0756 by Tobias Nyhan, RN Outcome: Progressing Towards Goal 
6/8/2019 1829 by Tobias Nyhan, RN Outcome: Progressing Towards Goal 
Goal: Diagnostic Test/Procedures 6/9/2019 0756 by Tobias Nyhan, RN Outcome: Progressing Towards Goal 
6/8/2019 1829 by Tobias Nyhan, RN Outcome: Progressing Towards Goal 
Goal: Nutrition/Diet 6/9/2019 0756 by Tobias Nyhan, RN Outcome: Progressing Towards Goal 
6/8/2019 1829 by Tobias Nyhan, RN Outcome: Progressing Towards Goal 
Goal: Discharge Planning 6/9/2019 0756 by Tobias Nyhan, RN Outcome: Progressing Towards Goal 
6/8/2019 1829 by Tobias Nyhan, RN Outcome: Progressing Towards Goal 
Goal: Medications 6/9/2019 0756 by Tobias Nyhan, RN Outcome: Progressing Towards Goal 
6/8/2019 1829 by Tobias Nyhan, RN Outcome: Progressing Towards Goal 
Goal: Respiratory 6/9/2019 0756 by Tobias Nyhan, RN Outcome: Progressing Towards Goal 
6/8/2019 1829 by Tobias Nyhan, RN Outcome: Progressing Towards Goal 
 Goal: Treatments/Interventions/Procedures 6/9/2019 0756 by Rox Velazquez RN Outcome: Progressing Towards Goal 
6/8/2019 1829 by Rox Velazquez RN Outcome: Progressing Towards Goal 
Goal: Psychosocial 
6/9/2019 0756 by Rox Velazquez RN Outcome: Progressing Towards Goal 
6/8/2019 1829 by Rox Velazquez RN Outcome: Progressing Towards Goal 
  
Problem: Sepsis: Day 6 Goal: Off Pathway (Use only if patient is Off Pathway) 6/9/2019 0756 by Rox Velazqeuz RN Outcome: Progressing Towards Goal 
6/8/2019 1829 by Rox Velazquez RN Outcome: Progressing Towards Goal 
Goal: *Oxygen saturation within defined limits 6/9/2019 0756 by Rox Velazquez RN Outcome: Progressing Towards Goal 
6/8/2019 1829 by Rox Velazquez RN Outcome: Progressing Towards Goal 
Goal: *Vital signs within defined limits 6/9/2019 0756 by Rox Velazquez RN Outcome: Progressing Towards Goal 
6/8/2019 1829 by Rox Velazquez RN Outcome: Progressing Towards Goal 
Goal: *Tolerating diet 6/9/2019 0756 by Rox Velazquez RN Outcome: Progressing Towards Goal 
6/8/2019 1829 by Rox Velazquez RN Outcome: Progressing Towards Goal 
Goal: *Demonstrates progressive activity 6/9/2019 0756 by Rox Velazquez RN Outcome: Progressing Towards Goal 
6/8/2019 1829 by Rox Velazquez RN Outcome: Progressing Towards Goal 
Goal: Influenza immunization 6/9/2019 0756 by Rox Velazquez RN Outcome: Progressing Towards Goal 
6/8/2019 1829 by Rox Velazquez RN Outcome: Progressing Towards Goal 
Goal: *Pneumococcal immunization 6/9/2019 0756 by Rox Velazquez, RN Outcome: Progressing Towards Goal 
6/8/2019 1829 by Rox Velazquez RN Outcome: Progressing Towards Goal 
Goal: Activity/Safety 6/9/2019 0756 by Rox Velazquez RN Outcome: Progressing Towards Goal 
6/8/2019 1829 by Rox Velazquez RN Outcome: Progressing Towards Goal 
Goal: Diagnostic Test/Procedures 6/9/2019 0756 by Rox Velazquez RN Outcome: Progressing Towards Goal 
 6/8/2019 1829 by Eduin Grimes RN Outcome: Progressing Towards Goal 
Goal: Nutrition/Diet 6/9/2019 0756 by Eduin Grimes RN Outcome: Progressing Towards Goal 
6/8/2019 1829 by Eduin Grimes RN Outcome: Progressing Towards Goal 
Goal: Discharge Planning 6/9/2019 0756 by Eduin Grimes RN Outcome: Progressing Towards Goal 
6/8/2019 1829 by Eduin Grimes RN Outcome: Progressing Towards Goal 
Goal: Medications 6/9/2019 0756 by Eduin Grimes RN Outcome: Progressing Towards Goal 
6/8/2019 1829 by Eduin Grimes RN Outcome: Progressing Towards Goal 
Goal: Respiratory 6/9/2019 0756 by Eduin Grimes RN Outcome: Progressing Towards Goal 
6/8/2019 1829 by Eduin Grimes RN Outcome: Progressing Towards Goal 
Goal: Treatments/Interventions/Procedures 6/9/2019 0756 by Eduin Grimes RN Outcome: Progressing Towards Goal 
6/8/2019 1829 by Eduin Grimes RN Outcome: Progressing Towards Goal 
Goal: Psychosocial 
6/9/2019 0756 by Eduin Grimes RN Outcome: Progressing Towards Goal 
6/8/2019 1829 by Eduin Grimes RN Outcome: Progressing Towards Goal 
  
Problem: Sepsis: Discharge Outcomes Goal: *Vital signs within defined limits 6/9/2019 0756 by Eduin Grimes RN Outcome: Progressing Towards Goal 
6/8/2019 1829 by Eduin Grimes RN Outcome: Progressing Towards Goal 
Goal: *Tolerating diet 6/9/2019 0756 by Eduin Grimes RN Outcome: Progressing Towards Goal 
6/8/2019 1829 by Eduin Grimes RN Outcome: Progressing Towards Goal 
Goal: *Verbalizes understanding and describes prescribed diet 6/9/2019 0756 by Eduin Grimes RN Outcome: Progressing Towards Goal 
6/8/2019 1829 by Eduin Grimes RN Outcome: Progressing Towards Goal 
Goal: *Demonstrates progressive activity 6/9/2019 0756 by Eduin Grimes RN Outcome: Progressing Towards Goal 
6/8/2019 1829 by Eduin Grimes RN Outcome: Progressing Towards Goal 
Goal: *Describes follow-up/return visits to physicians 6/9/2019 0756 by Angie Rose, RN Outcome: Progressing Towards Goal 
6/8/2019 1829 by Angie Rose, RN Outcome: Progressing Towards Goal 
Goal: *Verbalizes name, dosage, time, side effects, and number of days to continue medications 6/9/2019 0756 by Angie Rose, RN Outcome: Progressing Towards Goal 
6/8/2019 1829 by Angie Rose, RN Outcome: Progressing Towards Goal 
Goal: *Influenza immunization (Oct-Mar only) 6/9/2019 0756 by Angie Rose, RN Outcome: Progressing Towards Goal 
6/8/2019 1829 by Angie Rose, RN Outcome: Progressing Towards Goal 
Goal: *Pneumococcal immunization 6/9/2019 0756 by Angie Rose, RN Outcome: Progressing Towards Goal 
6/8/2019 1829 by Angie Rose, RN Outcome: Progressing Towards Goal 
Goal: *Lungs clear or at baseline 6/9/2019 0756 by Angie Rose, RN Outcome: Progressing Towards Goal 
6/8/2019 1829 by Angie Rose, RN Outcome: Progressing Towards Goal 
Goal: *Oxygen saturation returns to baseline or 90% or better on room air 6/9/2019 0756 by Angie Rose, RN Outcome: Progressing Towards Goal 
6/8/2019 1829 by Angie Rose, RN Outcome: Progressing Towards Goal 
Goal: *Glycemic control 6/9/2019 0756 by Angie Rose, RN Outcome: Progressing Towards Goal 
6/8/2019 1829 by Angie Rose, RN Outcome: Progressing Towards Goal 
Goal: *Absence of deep venous thrombosis signs and symptoms(Stroke Metric) 6/9/2019 0756 by Angie Rose, RN Outcome: Progressing Towards Goal 
6/8/2019 1829 by Angie Rose, RN Outcome: Progressing Towards Goal 
Goal: *Describes available resources and support systems 6/9/2019 0756 by Angie Rose, RN Outcome: Progressing Towards Goal 
6/8/2019 1829 by Angie Rose, RN Outcome: Progressing Towards Goal 
Goal: *Optimal pain control at patient's stated goal 
6/9/2019 0756 by Angie Rose, RN Outcome: Progressing Towards Goal 
6/8/2019 1829 by Angie Rose, RN Outcome: Progressing Towards Goal

## 2019-06-09 NOTE — PROGRESS NOTES
0730 Received report from off going [facility] C[place]ay at the bedside covering SBAR, lines drains drips and plan. Patient has improved blood sugar with start of continuos drips D10 at 25cc/hr. Patient output for last shift at 230 cc. Patient now having regular bowel movements. 80 MD Ramos called regarding placed order for Bumex, order changed to lasix drip. Telephone order entered. 1300 Patient lozoya leaking, not providing correct output information, in light of lasix ongoing drip, lozoya removed and new lozoya placed, patient had output with new lozoya of 350 cc clear yellow urine. Old lozoya irrigated before removal but showed no change. Sample from new lozoya sent for UA per protocol. 1900 Report to on coming [facility] Koroma at the bedside inclusive of SBAR, lines drips drains and plan.

## 2019-06-09 NOTE — PROGRESS NOTES
RENAL PROGRESS NOTE Jada Rosen Assessment/Plan: · EMMETT: nonoliguric. Creat stabilized. Keep Neg fluid balance 500-1000 ml/day with limiting input & diuresing with aid of IV Albumin. Renal US. · HypoK: replace K/Mg. · HFpEF/Volume overload: Improved with diuresis. · Resp failure: improved with diuresis. · BLE Cellulitis/sepsis in a setting of pad/chronic venous insufficiency. · HTN: controlled. · UTI: f/u culture. Antibiotics. · AMS: from Hypoglycemia. Tube feeds. Subjective:Patient complaints of: Nonverbal 
 
Patient Active Problem List  
Diagnosis Code  Edema R60.9  Cough R05  Hypercholesterolemia E78.00  Fibromyalgia M79.7  hypothyroid E07.9  Dermatitis L30.9  Essential hypertension I10  Type 2 diabetes mellitus with mild nonproliferative diabetic retinopathy without macular edema ([facility] [address] W71.4610  Type 2 diabetes with nephropathy ([facility]) E11.21  
 Severe obesity ([facility]) E66.01  
 Hypoglycemia E16.2  PAD (peripheral artery disease) ([facility]) I73.9  Sepsis ([facility] [address] A41.9  Acute respiratory failure ([facility]) J96.00  
 Cellulitis L03.90  Acute on chronic combined systolic and diastolic heart failure ([facility]) I50.43  Anemia D64.9  Acute encephalopathy G93.40 Current Facility-Administered Medications Medication Dose Route Frequency Provider Last Rate Last Dose  potassium chloride (K-DUR, KLOR-CON) SR tablet 20 mEq  20 mEq Oral BID Brynn Mcgovern [place], [place]      
 potassium chloride 20 mEq in 50 ml IVPB  20 mEq IntraVENous ONCE Marcel [place], [place] 25 mL/hr at 06/09/19 1020 20 mEq at 06/09/19 1020  
 0.9% sodium chloride infusion 250 mL  250 mL IntraVENous PRN Yogesh Galvez MD      
  bumetanide (BUMEX) injection 1 mg  1 mg IntraVENous BID Jenifer Ford MD   1 mg at 06/09/19 0904  VANCOMYCIN INFORMATION NOTE   Other ONCE Gretchen Acosta MD      
 dextrose 10% infusion  25 mL/hr IntraVENous CONTINUOUS Marshall Alexis MD 25 mL/hr at 06/08/19 2038 25 mL/hr at 06/08/19 2038  ELECTROLYTE REPLACEMENT PROTOCOL - Potassium Standard  Dosing Details for Fluid Restricted Patients  1 Each Other PRN Gretchen Acosta MD      
 ELECTROLYTE REPLACEMENT PROTOCOL - Magnesium   1 Each Other PRN Gretchen Acosta MD      
 ELECTROLYTE REPLACEMENT PROTOCOL - Phosphorus  Standard Dosing  1 Each Other PRN Gretchen Acosta MD      
 ELECTROLYTE REPLACEMENT PROTOCOL - Calcium   1 Each Other PRN Gretchen Acosta MD      
 polyethylene glycol [facility]) packet 17 g  17 g Oral DAILY [facility]   17 g at 06/09/19 1012  amLODIPine (NORVASC) tablet 10 mg  10 mg Oral DAILY NESHA Nichols   Stopped at 06/09/19 0900  piperacillin-tazobactam (ZOSYN) 3.375 g in 0.9% sodium chloride (MBP/ADV) 100 mL \"EXTENDED 4 HOUR INFUSION###\"  3.375 g IntraVENous Q8H [facility] 25 mL/hr at 06/09/19 1019 3.375 g at 06/09/19 1019  
 insulin glargine (LANTUS) injection 12 Units  12 Units SubCUTAneous DAILY NESHA Nichols   Stopped at 06/09/19 0900  
 nystatin (MYCOSTATIN) 100,000 unit/mL oral suspension 500,000 Units  500,000 Units Oral QID NESHA Nichols   Stopped at 06/08/19 0900  
 insulin lispro (HUMALOG) injection   SubCUTAneous AC&HS Gretchen Acosta MD   Stopped at 06/06/19 2300  hydrALAZINE (APRESOLINE) 20 mg/mL injection 20 mg  20 mg IntraVENous Q4H PRN Gretchen Acosta MD   20 mg at 06/07/19 0386  dextrose 10% infusion 125-150 mL  125-150 mL IntraVENous PRN Gretchen Acosta  mL/hr at 06/08/19 1907 250 mL at 06/08/19 1907  acetaminophen (TYLENOL) tablet 650 mg  650 mg Oral Q4H PRN NESHA Nichols   650 mg at 06/06/19 7191  metoprolol (LOPRESSOR) injection 5 mg  5 mg IntraVENous Q6H [address] [place]   Stopped at 06/09/19 0900  
 sodium chloride (NS) flush 5-10 mL  5-10 mL IntraVENous PRN Emily Villeda MD   10 mL at 06/04/19 2206  heparin 25,000 units in D5W 250 ml infusion  18-36 Units/kg/hr IntraVENous TITRATE Emily Villeda MD 6.8 mL/hr at 06/09/19 0734 6 Units/kg/hr at 06/09/19 1163  levothyroxine (SYNTHROID) tablet 112 mcg  112 mcg Oral ACB Emily Villeda MD   112 mcg at 06/09/19 2273  VANCOMYCIN INFORMATION NOTE 1 Each  1 Each Other Rx Dosing/Monitoring Hany Barrientos MD      
 glucose chewable tablet 16 g  4 Tab Oral PRN Ogbolu Caitlyn I, NP   16 g at 06/08/19 1711  
 glucagon (GLUCAGEN) injection 1 mg  1 mg IntraMUSCular PRN Ogbolu Caitlyn I, NP      
 pantoprazole (PROTONIX) injection 40 mg  40 mg IntraVENous DAILY Ogbolu, Caitlyn I, NP   40 mg at 06/09/19 3251 Objective Vitals:  
 06/09/19 0730 06/09/19 0756 06/09/19 0800 06/09/19 0900 BP: 137/57  139/55 Pulse: 70  65 (!) 58 Resp: 15  19 Temp:      
SpO2: 100% 100% 97% Weight:      
Height:      
 
 
 
Intake/Output Summary (Last 24 hours) at 6/9/2019 1105 Last data filed at 6/9/2019 0800 Gross per 24 hour Intake 2095.17 ml Output 540 ml Net 1555.17 ml Admission weight: Weight: 113.4 kg (250 lb) (06/03/19 0140) Last Weight Metrics: 
Weight Loss Metrics 6/8/2019 5/9/2019 9/20/2018 3/23/2018 8/4/2017 2/23/2017 10/6/2016 Today's Wt 259 lb 235 lb 207 lb 3.2 oz 209 lb 3.2 oz 195 lb 198 lb 4.8 oz 198 lb BMI 43.1 kg/m2 39.11 kg/m2 34.48 kg/m2 34.81 kg/m2 32.45 kg/m2 33 kg/m2 32.95 kg/m2 Physical Assessment:  
 
General: Nonverbal. 
Neck: No jvd. LUNGS: Clear to Auscultation, No rales, rhonchi or wheezes. CVS EXM: S1, S2  RRR, no murmurs/gallops/rubs. Abdomen: soft, non tender. Lower Extremities:  +edema. Lab CBC w/Diff Recent Labs  
  06/09/19 
0440 06/08/19 
0400 06/07/19 
0430 WBC 14.0* 11.4 13.0  
RBC 2.90* 2.37* 2.64* HGB 8.2* 6.5* 7.3* HCT 24.8* 20.3* 22.1*  
 177 194 GRANS 75* 73 86* LYMPH 13* 14* 7*  
EOS 2 1 0 Chemistry Recent Labs  
  06/09/19 
0440 06/08/19 
1630 06/08/19 
0400 GLU 89 75 64*  140 138  
K 3.1* 2.9* 3.1*  
 105 105 CO2 25 26 26 BUN 50* 49* 49* CREA 2.12* 2.10* 1.84* CA 8.2* 7.8* 8.4* AGAP 10 9 7 BUCR 24* 23* 27* ALB 3.6  --  3.3*  
PHOS 3.6  --  3.8 No results found for: IRON, FE, TIBC, IBCT, PSAT, FERR Lab Results Component Value Date/Time Calcium 8.2 (L) 06/09/2019 04:40 AM  
 Phosphorus 3.6 06/09/2019 04:40 AM  
  
 
Jeff Brooks MD 
[facility] Pager: [phone]

## 2019-06-09 NOTE — PROGRESS NOTES
Internal Medicine Progress Note Patient's Name: Tiny Mascorro Admit Date: 6/3/2019 Length of Stay: 6 Assessment/Plan Principal Problem: 
  Sepsis ([address] (6/3/2019) Active Problems: 
  PAD (peripheral artery disease) ([address] (6/3/2019) Acute encephalopathy (6/9/2019) Acute on chronic combined systolic and diastolic heart failure ([address] (6/4/2019) 
 
  hypothyroid () Essential hypertension (9/3/2015) Type 2 diabetes with nephropathy ([address] (3/23/2018) Severe obesity ([address] (5/9/2019) Hypoglycemia (6/3/2019) Acute respiratory failure ([address] (6/3/2019) Cellulitis (6/3/2019) Anemia (6/8/2019) Sepsis - possible 2/2 BLE cellulitis, PNA? 
- PCCM consulted - appreciate - IV abx: vanc/zosyn 
- leukocytosis - wound care for BLE 
- 1/2 BCx 6/3 positive for coag neg staph 
- repeat BCx NGTD 
  
Acute encephalopathy  
-most likely multifactorial due to metabolic conditions, maybe hemorrhagic  
-CT head 6/8: ddx includes microvascular ischemic change, demylination, and white matter infacrt 
-Hold off from MRI brain w/contrast until kidneys improve Acute Resp Failure 
- bipap, NC as tolerated - tx CHF 
  
Diabetes - Hypoglycemic this morning 
-mentation improving with glucose ggt 
- monitor BG 
- lantus/SSI 
  
Acute on chronic CHF 
- BNP 69386, left pleural effusion 
- monitor BMP 
- echo done, results below 
- strict I&Os -nephrology consulted - appreciate services 
-potential dialysis vs ultrafiltration PAD with arterial occlusion - VS consulted - appreciate -  BLE duplex - b/l PAD, mod stenosis in R CF artery, absent R PT artery consistent with occlusion, absent L distal pop/PT/AT arteries consistent with occlusion. 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 
- Hep gtt 
- no emergent vascular intervention - pt unstable 
- will eventually need aortogram with BLE r/o with possible intervention per VS 
  
EMMETT 
- monitor BMP 
- creatinine rising -nephrology consulted - appreciate services -minimal urine output overnight HTN 
- monitor  
- add PO meds 
  
Anemia 
-blood transfusion 
 
- Cont acceptable home medications for chronic conditions  
- DVT protocol I have personally reviewed all pertinent labs and films that have officially resulted over the last 24 hours. I have personally checked for all pending labs that are awaiting final results. Interval History Per H&P, \"Nette Lama is a [de-identified] y.o. female who was found down by her son. Jena Sensing had weakness and altered mental status.  She also had SOB.  She has a known history of Diabetes.  In the ER she was also found to have hypoglycemia. Jena Sensing is noted to have decreased pulses bilaterally.  She also was found to have significant respiratory distress and was started on BiPap.  Vascular surgery was called because of decreased perfusion to both feet.  She has severe erythema involving both feet as well as her abdomen and below her breast. Teresa Pale is significant concern for sepsis along with her acute respiratory failure.  She is admitted for ongoing management. \" 
  
Hypoglycemia initially managed with D10 bolus and D20 infusion. Vascular surgery recommended heparin gtt and imaging. BLE duplex - b/l PAD, mod stenosis in R CF artery, absent R PT artery consistent with occlusion, absent L distal pop/PT/AT arteries consistent with occlusion. 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 No emergent surgical intervention per VS until patient stabilizes. Wound Care consulted. Patient also found to be in heart failure, bumex started. BCx 6/3 positive for coag neg staph in 1/2 bottles. Repeat BCx done 6/6. Creatinine noted to be rising gradually. PT/OT recommending SNF. ST recs puree/nectar thick liquids. Subjective Pt s/e @ bedside. No major events overnight. Pt did moan today when asked how are you doing, slight increase in activity. Maintains non verbal  
 
Objective Visit Vitals /55 Pulse (!) 58 Temp 97.8 °F (36.6 °C) Resp 19 Ht 5' 5\" (1.651 m) Wt 117.5 kg (259 lb) SpO2 97% BMI 43.10 kg/m² Physical Exam: 
General Appearance: NAD, fluid overloaded HENT: normocephalic/atraumatic, moist mucus membranes Neck: No JVD, supple Lungs:  normal respiratory effort, no wheezing, slightly course breath sounds CV: bradycaria, no m/r/g Abdomen: soft, non-tender, normal bowel sounds Extremities: no cyanosis, anasarca, pitting edema Neuro: Remains essentially unchanged from previous exams, slight improvement in mental status Intake and Output: 
Current Shift:  06/09 0701 - 06/09 1900 In: -  
Out: 20 [Urine:20] Last three shifts:  06/07 1901 - 06/09 0700 In: 2870.8 [I.V.:1910.8] Out: 1280 [VYQTB:9015] Lab/Data Reviewed: All lab results for the last 24 hours reviewed. Imaging Reviewed: 
Ct Head Wo Cont Result Date: 6/8/2019 EXAM: CT head INDICATION: Decreased alertness COMPARISON: 10/16/2012 TECHNIQUE: Axial scanning was performed through the head without intravenous contrast.  Sagittal and coronal reconstructions were created from the axial data. One or more dose reduction techniques were used on this CT: automated exposure control, adjustment of the mAs and/or kVp according to patient size, and iterative reconstruction techniques. The specific techniques used on this CT exam have been documented in the patient's electronic medical record. Digital Imaging and Communications in Medicine (DICOM) format image data are available to nonaffiliated external healthcare facilities or entities on a secure, media free, reciprocally searchable basis with patient authorization for at least a 12-month period after this study. _______________ FINDINGS: BRAIN AND POSTERIOR FOSSA: Ventricles, cisterns and sulci are within normal range. No intracranial hemorrhage, mass effect, or midline shift.  2 adjacent focal low densities are present in the right corona radiata, the larger measuring 12 mm, not previously present, no mass effect. Gray-white differentiation is preserved. EXTRA-AXIAL SPACES AND MENINGES: No extracerebral collections. CALVARIUM: Intact. SINUSES: Visualized portions are clear. OTHER: None. _______________ IMPRESSION: Interval developing 2 adjacent nonspecific right corona radiata low densities. Differential considerations include microvascular ischemic white matter change, demyelination, and white matter infarct. Medications Reviewed: 
Current Facility-Administered Medications Medication Dose Route Frequency  potassium chloride (K-DUR, KLOR-CON) SR tablet 20 mEq  20 mEq Oral BID  potassium chloride 20 mEq in 50 ml IVPB  20 mEq IntraVENous ONCE  
 0.9% sodium chloride infusion 250 mL  250 mL IntraVENous PRN  
 bumetanide (BUMEX) injection 1 mg  1 mg IntraVENous BID  VANCOMYCIN INFORMATION NOTE   Other ONCE  
 dextrose 10% infusion  25 mL/hr IntraVENous CONTINUOUS  
 ELECTROLYTE REPLACEMENT PROTOCOL - Potassium Standard  Dosing Details for Fluid Restricted Patients  1 Each Other PRN  
 ELECTROLYTE REPLACEMENT PROTOCOL - Magnesium   1 Each Other PRN  
 ELECTROLYTE REPLACEMENT PROTOCOL - Phosphorus  Standard Dosing  1 Each Other PRN  
 ELECTROLYTE REPLACEMENT PROTOCOL - Calcium   1 Each Other PRN  polyethylene glycol (MIRALAX) packet 17 g  17 g Oral DAILY  amLODIPine (NORVASC) tablet 10 mg  10 mg Oral DAILY  piperacillin-tazobactam (ZOSYN) 3.375 g in 0.9% sodium chloride (MBP/ADV) 100 mL \"EXTENDED 4 HOUR INFUSION###\"  3.375 g IntraVENous Q8H  
 insulin glargine (LANTUS) injection 12 Units  12 Units SubCUTAneous DAILY  nystatin (MYCOSTATIN) 100,000 unit/mL oral suspension 500,000 Units  500,000 Units Oral QID  insulin lispro (HUMALOG) injection   SubCUTAneous AC&HS  hydrALAZINE (APRESOLINE) 20 mg/mL injection 20 mg  20 mg IntraVENous Q4H PRN  
  dextrose 10% infusion 125-150 mL  125-150 mL IntraVENous PRN  
 acetaminophen (TYLENOL) tablet 650 mg  650 mg Oral Q4H PRN  
 metoprolol (LOPRESSOR) injection 5 mg  5 mg IntraVENous Q6H  
 sodium chloride (NS) flush 5-10 mL  5-10 mL IntraVENous PRN  
 heparin 25,000 units in D5W 250 ml infusion  18-36 Units/kg/hr IntraVENous TITRATE  levothyroxine (SYNTHROID) tablet 112 mcg  112 mcg Oral ACB  VANCOMYCIN INFORMATION NOTE 1 Each  1 Each Other Rx Dosing/Monitoring  glucose chewable tablet 16 g  4 Tab Oral PRN  
 glucagon (GLUCAGEN) injection 1 mg  1 mg IntraMUSCular PRN  pantoprazole (PROTONIX) injection 40 mg  40 mg IntraVENous DAILY Maurizio Browne PA-C [facility] Pager: [phone] Office: [phone]

## 2019-06-09 NOTE — PROGRESS NOTES
2000 Assumed care of pt after bedside report with pt lying in bed with HOB elevated. Pt on BIPAP at prescribed settings. Pt with eyes open but does not follow commands, does not lift arms, wiggle toes or fingers, etc. Pt is severely edematous with anasarca. If pt responds verbally is difficult ot understand and delayed responses. Monitor denotes controlled afib with BBB. Lungs coarse diminished in bases. No cough. Abd distended, obese, firm. Pt has redness under rt breast and redness on rt outer thigh as well as BLE , feet, etc. Mepilex in place over open area on sacrum/coccyx. 2010 Critical result of K+ 2.9 called to nurse and Dr. Genesis Berman advised of same. Also advised Dr. Genesis Berman that pt is having continually low glucose levels. 2030 Order given for IV infusion of D10 to infuse at 25 ml/hr and order implemented. Pt also has NGT in place with Osmolite 1.2 camryn infusing at 10 ml/hr. K+ replacements IVPB as per electrolyte protocol. 2200 pt stats unchanged. 06/09/2019 0000 Accuchecks checked q2h and result in low 100's. 0200 Pt awake  But gandara not follow commands. 0440 AM labs drawn and sent to lab. 0600 Pt status unchanged.

## 2019-06-10 NOTE — PROGRESS NOTES
2000 Assumed care of pt after bedside report with pt lying in bed with HOB elevated. Pt more alert and interactive today. Responds appropriately to questions and talks to nurse. Pt does not move extremities much due to weakness and edema. Monitor denotes controlled afib with BBB. Lungs coarse diminished in bases. O2 in progress at 3 L/NC. Pt has NGT in place in lt nare at 74 cm with TF of Osmolite 1.2 camryn infusing at 10 ml/hr. Abd distended, obese, semi-soft. Pt on D10 drip at 50 ml/hr. Also pt on Lasix drip with good urine output of clear yellow urine in lozoya catheter. Pt has rt lower leg with dressing in place and dry and intact. Pt has prevalon boots in place. Bilateral LE elevated to reduce swelling. 2200 Pt status remains unchanged. 06/10/2019 0000 Accucheck result 147. 0130 AM labs drawn and sent to lab. 0430 Heparin drip titrated as per protocol and PTT results. 0600 Accucheck result 184.

## 2019-06-10 NOTE — PROGRESS NOTES
Problem: Mobility Impaired (Adult and Pediatric) Goal: *Acute Goals and Plan of Care (Insert Text) Description Physical Therapy Goals Initiated 6/6/2019 and to be accomplished within 7 days. 1.  Patient will complete all bed mobility with moderate assistance  in order to prepare for EOB/OOB activity. 2.  Patient will tolerate sitting EOB x 10 minutes in order to safely participate with PT.  
3.  Patient will perform sit <> stand with maximal assistance in order to prepare for OOB/gait activity. 4.  Patient will perform bed to chair transfers with maximal assistance in order to promote mobility and encourage seated activity to progress towards their prior level of function. 5.  Patient will participate in 10 minutes of B LE exercise/AROM with moderate assistance . Outcome: Progressing Towards Goal 
 PHYSICAL THERAPY TREATMENT Patient: Dank Mary (85 y.o. female) Date: 6/10/2019 Diagnosis: Hypoglycemia [E16.2] PAD (peripheral artery disease) ([facility] [I73.9] Diabetes ([facility] [E11.9] Sepsis ([facility] Precautions: Fall PLOF:  
 
ASSESSMENT: 
Patient supine in bed, agreeable to participation with PT. MAX A for supine > sit. Patient with poor seated balance; verbal cues for anterior weightshift. Patient demo'ing no AROM in L UE; sensation appears impaired. Patient tolerates sitting EOB x 15 minutes. Participates in seated AAROM LE exercises with frequent cues for repetitions. Patient returned to bed with MAX A. Left supine in bed with all needs within reach. KVNG Llamas notified of decreased L UE function. Patient educated on role of PT, PT POC, bed mobility, transfers, gait, and safety with mobility as indicated. Progression toward goals:  
?      Improving appropriately and progressing toward goals ? Improving slowly and progressing toward goals ? Not making progress toward goals and plan of care will be adjusted PLAN: 
 Patient continues to benefit from skilled intervention to address the above impairments. Continue treatment per established plan of care. Discharge Recommendations:  Jack Ventura Further Equipment Recommendations for Discharge:  TBD SUBJECTIVE:  
Patient stated ? My back hurts. ? OBJECTIVE DATA SUMMARY:  
Critical Behavior: 
Neurologic State: Alert Orientation Level: Oriented to person, Oriented to place Cognition: Follows commands Safety/Judgement: Insight into deficits Functional Mobility Training: 
Bed Mobility: 
  
Supine to Sit: Maximum assistance;Assist x2 Sit to Supine: Total assistance;Assist x2 Transfers: 
Deferred for safety Balance: 
Sitting: Impaired Sitting - Static: Poor (constant support) Sitting - Dynamic: Poor (constant support) Range Of Motion: 
AROM: Grossly decreased, non-functional(L UE.) PROM: Generally decreased, functional 
 
Neuro Re-Education: Anterior weight shift for seated balance Therapeutic Exercises:  
Seated EOB EXERCISE Sets Reps Active Active Assist  
Passive Self ROM Comments Ankle Pumps    ? ? ? ?   
Quad Sets/Glut Sets    ? ? ? ? Hamstring Sets   ? ? ? ? Short Arc Quads   ? ? ? ? Heel Slides   ? ? ? ? Straight Leg Raises   ? ? ? ? Hip Add   ? ? ? ? Long Arc Quads 2 10 ? ? ? ? B LEs Seated Marching   ? ? ? ? Standing Marching   ? ? ? ?   
   ? ? ? ? Pain: Back pain Pain level pre-treatment: N/A Pain level post-treatment: N/A Pain Intervention(s): Improved with rest 
Activity Tolerance:  
Fair Please refer to the flowsheet for vital signs taken during this treatment. After treatment:  
? Patient left in no apparent distress sitting up in chair ? Patient left in no apparent distress in bed 
? Call bell left within reach ? Nursing notified ? Caregiver present ? Bed alarm activated ? SCDs applied COMMUNICATION/EDUCATION:  
 ?         Role of Physical Therapy in the acute care setting. ?         Fall prevention education was provided and the patient/caregiver indicated understanding. ? Patient/family have participated as able in working toward goals and plan of care. ?         Patient/family agree to work toward stated goals and plan of care. ?         Patient understands intent and goals of therapy, but is neutral about his/her participation. ? Patient is unable to participate in stated goals/plan of care: ongoing with therapy staff. ?         Other: 
 
   
Osiel Aguayo Time Calculation: 38 mins

## 2019-06-10 NOTE — PROGRESS NOTES
Respiratory Therapy Assessment Care Plan Patient: 
Franky Pham [de-identified] y.o. female 6/10/2019 3:28 PM 
 
Hypoglycemia [E16.2] PAD (peripheral artery disease) ([facility] [I73.9] Diabetes ([facility] [E11.9] Chest X-RAY:  
Results from Hospital Encounter encounter on 06/03/19 XR ABD (KUB) Impression IMPRESSION: 
 
Nasogastric tube tip at the expected position of the distal stomach. XR ABD (KUB) Impression IMPRESSION: 
 
Nasogastric tube tip at the EG junction. Telephone report was called to ICU 
staff at 8:45 AM on 6/8/2019. XR CHEST PORT Impression Impression: 
 
NG/OG tube extends down to the GE junction and coils back up into the upper 
thoracic esophagus. Reinsertion suggested. Consolidation left lung base obscuring the hemidiaphragm is likely due to 
pleural effusion and adjacent infiltrate/atelectasis. CRITICAL: Critical result given to nurse Lizette at [phone] hours by Dr. Moe Meek. Vital Signs:   Visit Vitals /64 Pulse 75 Temp 98.1 °F (36.7 °C) Resp 16 Ht 5' 5\" (1.651 m) Wt 117.8 kg (259 lb 11.2 oz) SpO2 100% BMI 43.22 kg/m² Indications for treatment: CPT for infiltrates/atelectasis Plan of care: Pt BIPAP removed-maintain oxygenation and initiate BIPAP as needed Goal: Continue care on Nc, titrate O2 as tolerated to keep SPO2 > 92%

## 2019-06-10 NOTE — PROGRESS NOTES
[facility] ICU Progress Note Name: Jennifer Cortez : 1940 MRN: [id number] Date: 6/10/2019 2:07 PM 
  
[x]I have reviewed the flowsheet and previous days notes. Events overnight reviewed and discussed with nursing staff. Vital signs and records reviewed. Subjective: 
06/10/19: 
 
-D-5 at 25/hr  With Lantus at 5. BS WNL for now 
-Patient appearing more alert and oriented, but remains lethargic 
-BiPap at night (NC in day) 
-Abx discontinued 
-Still on Heparin gtt (low dose) 
-Plan for Vascular to perform intervention at a further date due to chronicity of LLE arterial occlusion 
-S/p PRBC x 1  
-Head CT obtained- microvascular ischemic white matter change  
-Metoprolol IVP made PRN due to reports of bradycardia and Hypotension 
-Requiring K+ replacement. Transition to oral solution compound. Continue per protocol but will also add Lakeshia'd KCL  
-Keep on Lasix gtt (non-titratable dose and OK to monitor I/O q4hrs on floor) 
-Ok to transfer  
 
 
 
[x]The patient is unable to give any meaningful history or review of systems because the patient is: 
[]Intubated []Sedated  
[]Unresponsive []The patient is critically ill on     
[]Mechanical ventilation []Pressors []BiPAP [] ROS:A comprehensive review of systems was negative except for that written in the HPI. Medication Review: · Pressors - None · Sedation - None · Antibiotics -  
· Pain - APAP, PRN 
· GI/ DVT - Protonix IVP / Heparin gtt Vital Signs:   
Visit Vitals /46 Pulse (!) 109 Temp 98.7 °F (37.1 °C) Resp 16 Ht 5' 5\" (1.651 m) Wt 117.8 kg (259 lb 11.2 oz) SpO2 100% BMI 43.22 kg/m² O2 Device: Nasal cannula O2 Flow Rate (L/min): 3 l/min Temp (24hrs), Av.3 °F (36.8 °C), Min:97.6 °F (36.4 °C), Max:98.7 °F (37.1 °C) Intake/Output:  
Last shift:      06/10 0701 - 06/10 1900 In: 324.7 [I.V.:214.7] Out: 450 [Urine:450] Last 3 shifts: 06/08 1901 - 06/10 0700 In: 3169.6 [I.V.:2599.6] Out: 3232 [SRGYO:6964] Intake/Output Summary (Last 24 hours) at 6/10/2019 1115 Last data filed at 6/10/2019 1000 Gross per 24 hour Intake 2356.25 ml Output 3380 ml Net -1023.75 ml Physical Exam: 
 
General: Alert. [place] x 2   C/o back pain HEENT:  Anicteric sclerae; pink palpebral conjunctivae; mucosa moist 
Resp:  Symmetrical chest rise, no accessory muscle use. BS = B/L Coarse, decreased throughout (> L base). No rales/ wheezing/ rhonchi noted CV:  Afib, rate controlled. No m/g/r 
GI:  Abdomen soft, non-tender; (+) active bowel sounds Extremities:  +2 pulses on B/L UE's.  +2 pulses on B/L Groins. Absent Pulses on B/L LE's (DP/PT). Skin:  Warm; + Edema (> LE's) Neurologic:  Alert. [place] x 2. Moves B/L LE's & RUE to command. Not moving LUE (? Secondary to edema). Devices:  + NGT, Central line & Jackson. DATA:  
 
Current Facility-Administered Medications Medication Dose Route Frequency  dextrose 5% infusion  25 mL/hr IntraVENous CONTINUOUS  
 [START ON 6/11/2019] insulin glargine (LANTUS) injection 5 Units  5 Units SubCUTAneous DAILY  metoprolol tartrate (LOPRESSOR) tablet 50 mg  50 mg Oral Q12H  potassium chloride (KLOR-CON) packet for solution 40 mEq  40 mEq Oral BID WITH MEALS  metoprolol (LOPRESSOR) injection 5 mg  5 mg IntraVENous Q6H PRN  
 [START ON 6/11/2019] pantoprazole (PROTONIX) tablet 40 mg  40 mg Oral ACB  furosemide (LASIX) 100 mg in 0.9% sodium chloride 100 mL infusion  15 mg/hr IntraVENous CONTINUOUS  
 amLODIPine (NORVASC) tablet 5 mg  5 mg Oral DAILY  0.9% sodium chloride infusion 250 mL  250 mL IntraVENous PRN  
 ELECTROLYTE REPLACEMENT PROTOCOL - Potassium Standard  Dosing Details for Fluid Restricted Patients  1 Each Other PRN  
 ELECTROLYTE REPLACEMENT PROTOCOL - Magnesium   1 Each Other PRN  
  ELECTROLYTE REPLACEMENT PROTOCOL - Phosphorus  Standard Dosing  1 Each Other PRN  
 ELECTROLYTE REPLACEMENT PROTOCOL - Calcium   1 Each Other PRN  polyethylene glycol (MIRALAX) packet 17 g  17 g Oral DAILY  nystatin (MYCOSTATIN) 100,000 unit/mL oral suspension 500,000 Units  500,000 Units Oral QID  insulin lispro (HUMALOG) injection   SubCUTAneous AC&HS  hydrALAZINE (APRESOLINE) 20 mg/mL injection 20 mg  20 mg IntraVENous Q4H PRN  
 dextrose 10% infusion 125-150 mL  125-150 mL IntraVENous PRN  
 acetaminophen (TYLENOL) tablet 650 mg  650 mg Oral Q4H PRN  
 sodium chloride (NS) flush 5-10 mL  5-10 mL IntraVENous PRN  
 heparin 25,000 units in D5W 250 ml infusion  18-36 Units/kg/hr IntraVENous TITRATE  levothyroxine (SYNTHROID) tablet 112 mcg  112 mcg Oral ACB  glucose chewable tablet 16 g  4 Tab Oral PRN  
 glucagon (GLUCAGEN) injection 1 mg  1 mg IntraMUSCular PRN Labs: Results:  
   
Chemistry Recent Labs  
  06/10/19 
0130 06/09/19 
1505 06/09/19 
0440 06/08/19 
1630 06/08/19 
0400 *  --  89 75 64*   --  138 140 138  
K 3.8 3.2* 3.1* 2.9* 3.1*  
  --  103 105 105 CO2 26  --  25 26 26 BUN 50*  --  50* 49* 49* CREA 2.18*  --  2.12* 2.10* 1.84* CA 7.9*  --  8.2* 7.8* 8.4* AGAP 10  --  10 9 7 BUCR 23*  --  24* 23* 27* ALB 3.0*  --  3.6  --  3.3*  
  
CBC w/Diff Recent Labs  
  06/10/19 
0130 06/09/19 
0440 06/08/19 
0400 WBC 14.5* 14.0* 11.4 RBC 2.52* 2.90* 2.37* HGB 7.1* 8.2* 6.5* HCT 21.6* 24.8* 20.3*  195 177 GRANS 78* 75* 73 LYMPH 11* 13* 14* EOS 3 2 1 Coagulation Recent Labs  
  06/10/19 
0130 06/09/19 
0440 APTT 114.1* 107.7* Liver Enzymes Recent Labs  
  06/10/19 
0130 ALB 3.0* ABG No results found for: PH, PHI, PCO2, PCO2I, PO2, PO2I, HCO3, HCO3I, FIO2, FIO2I Microbiology No results for input(s): CULT in the last 72 hours. Telemetry: []Sinus []A-flutter []Paced [x]A-fib []Multiple PVCs Imaging: 
None recently IMPRESSION:  
Altered mental status Hypercapnia- requiring BiPAP hs 
Gram-positive cocci bacteremia with identification- now resolved BLE Peripheral Artery Disease with Arterial Occlusion- Complete occlusion of LLE, near occlusion on R ICA. Lower extremity ischemia likely chronic Diffuse edema and volume overload Persistent hypoglycemia likely reflecting inadvertent glipizide overdose Hypervolemia hyponatremia- resolved Anemia of unclear etiology. Likely component of volume resuscitation Generalized weakness RECOMMENDATIONS:  
· Resp:  Tolerating N/c during the day & BiPAP qHS. 02 sat's high 90's. Continue pulmonary toilet (Vest Therapy q-4 hours) as hayden. Titrate FiO2/ supp O2 for SpO2 >90% · I/D: Afebrile. Mild leukocytosis. Repeat BCx negative thus far. ABX discontinued. · Hem/Onc: S/p 1 unit PRBC x 1 6/8/19. Hct 21.6 this AM.  PLT's trending down; Patient remains on Heparin gtt. Daily CBC. PTT checks per Heparin gtt protocol. Continue Heparin gtt for now. Eventually will need PO A/C  
· CVS: Stable. Tele = Afib, rate controlled. Goal SBP < 170   Lopressor IV made to PRN. Norvasc held Norvasc now 5mgs. Monitor for hypotension · Metabolic: Daily BMP; monitor e-lytes; replace PRN 
· Renal Following per Diuresis & Cr Mgmt: I > O's despite Bumex IVP's. Bumex IVP changed to lasix gtt. Cr rising daily (2.12 this AM). ?HD. Add Lakeshia'd KCL in addition to K+ replacement protocol. K+ 3.1 this AM.  Repeat K+ at 1600. Trend Renal indices; Diuresis, Jackson to BSD · Endocrine: D-5gtt continues for Hypoglycemia @ 25/hr. POC Glucose PRN Continue Synthroid for Thyroid d/o 
· GI: Hayden TF's. + BM this AM.  Continue Miralax daily. Zofran PRN for N/V  
· Musc/Skin: Wound Care following for B/L LE's Mgmt · Neuro: Head CT obtained yesterday (negative).   MRI of Brain (per Family request) timing per MD.  On exam, Alert & [place] x 2. C/o back pain = APAP PRN. Avoid Ultram, NSAIDS, & Narcotics. · Vascular Surgery following. No new imaging done & no intervention planned. · Fluids: D-5 gtt @ 25 cc /hr  
· PT/OT following · DNR/DNI status; Ok to transfer Best Practices/ Safety Bundles: 
· Sepsis Bundle per Hospital Protocol · CAUTI Bundle · Electrolyte Replacement Bundle · Glycemic control goal <180; avoid Hypoglycemia · IHI ICU Bundles: 
·  Central Line Bundle Followed  and Lozoya Bundle Followed · Mech Vent patients: · VAP bundle, Aim to keep peak plateau pressure 23-53BD H2O 
· Aspiration Precautions - HOB >30' · Daily sedation holiday as indicated · SBT as tolerated/appropriate · Titrate FiO2 for SpO2 >94% · Aggressive Pulmonary Hygeiene · Stress ulcer prophylaxis. Protonix IVP · DVT prophylaxis. Heparin gtt · Need for Lines, lozoya assessed. · Restraints need. · Palliative care evaluation. The patient is: [x] acutely ill Risk of deterioration: [] moderate  
 [] critically ill  [x] high  
 
[x]See my orders for details My assessment/plan was discussed with: 
[x]Nursing []PT/OT [x]Respiratory therapy [x]Dr. Toney Gutierrez [x]Family [] Critical Care time = 40 Minutes Pb Almaguer NP 
06/10/19 Pulmonary, Critical Care Medicine [facility]

## 2019-06-10 NOTE — PROGRESS NOTES
Problem: Dysphagia (Adult) Goal: *Acute Goals and Plan of Care (Insert Text) Description Recommendations: 
Diet: NPO Meds: via IV Aspiration Precautions Oral Care TID Goals:  Patient will: 1. Tolerate PO trials with 0 s/s overt distress in 4/5 trials 2. Utilize compensatory swallow strategies/maneuvers (decrease bite/sip, size/rate, alt. liq/sol) with min cues in 4/5 trials 3. Perform oral-motor/laryngeal exercises to increase oropharyngeal swallow function with min cues 4. Complete an objective swallow study (i.e., MBSS) to assess swallow integrity, r/o aspiration, and determine of safest LRD, min A  - completed 6/7/19 Outcome: Progressing Towards Goal 
  
SPEECH LANGUAGE PATHOLOGY DYSPHAGIA TREATMENT Patient: Wendie Richardson (21 y.o. female) Date: 6/10/2019 Diagnosis: Hypoglycemia [E16.2] PAD (peripheral artery disease) ([facility] [address] [I73.9] Diabetes ([facility] [address] [E11.9] Sepsis ([facility] [address] Precautions: aspiration Fall ASSESSMENT: 
Follow up this am with pt more alert; however, continues to present with wet breathing at rest. SLP encouraged coughing and throat clearing in attempt to clear laryngeal residue prior to ice chips trials. Pt attempted but continues weak and non-productive in nature. Accepted ice chip trials with immediate and delayed cough, throat clears, and wet vocal quality. SLP education pt on need for continuing with NG TF to decrease aspiration risk; verbalized comprehension but requires encouragement. SLP will continue to follow as indicated. D/w PA. Progression toward goals: 
?         Improving appropriately and progressing toward goals ? Improving slowly and progressing toward goals ? Not making progress toward goals and plan of care will be adjusted PLAN: 
Recommendations and Planned Interventions: 
Patient continues to benefit from skilled intervention to address the above impairments. Continue treatment per established plan of care. Discharge Recommendations:  [place] SUBJECTIVE:  
Patient stated ?okay? . 
 
OBJECTIVE:  
Cognitive and Communication Status: 
Neurologic State: Alert Orientation Level: Oriented to person, Oriented to place Cognition: Follows commands Perception: Appears intact Perseveration: No perseveration noted Safety/Judgement: Insight into deficits Dysphagia Treatment: 
 See above PAIN: 
Pain level pre-treatment: 0/10 Pain level post-treatment: 0/10 After treatment:  
?              Patient left in no apparent distress sitting up in chair ? Patient left in no apparent distress in bed 
? Call bell left within reach ? PA notified ? Family present ? Caregiver present ? Bed alarm activated COMMUNICATION/EDUCATION:  
? Aspiration precautions; swallow safety; compensatory techniques ? Patient unable to participate in education; education ongoing with staff ? Posted safety precautions in patient's room. ? Oral-motor/laryngeal strengthening exercises Marden Expose, SLP Time Calculation: 17 mins

## 2019-06-10 NOTE — PROGRESS NOTES
Results for Vernell Mayorga (MRN [id number]) as of 6/10/2019 13:10 Ref. Range 6/10/2019 11:10  
aPTT Latest Ref Range: 23.0 - 36.4 SEC 59.5 (H) Heparin bolus of 4,540 units given per protocol. Heparin gtt rate changes done ( pls see MAR) Next Aptt due @ 1910

## 2019-06-10 NOTE — PROGRESS NOTES
Problem: Falls - Risk of 
Goal: *Absence of Falls Description Document Radha Orlando Fall Risk and appropriate interventions in the flowsheet. Outcome: Progressing Towards Goal 
  
Problem: Pressure Injury - Risk of 
Goal: *Prevention of pressure injury Description Document Tate Scale and appropriate interventions in the flowsheet. Outcome: Progressing Towards Goal 
Note:  
Pressure Injury Interventions: 
Sensory Interventions: Check visual cues for pain Moisture Interventions: Absorbent underpads, Check for incontinence Q2 hours and as needed Activity Interventions: Pressure redistribution bed/mattress(bed type) Mobility Interventions: HOB 30 degrees or less, Pressure redistribution bed/mattress (bed type) Nutrition Interventions: Document food/fluid/supplement intake Friction and Shear Interventions: Foam dressings/transparent film/skin sealants, HOB 30 degrees or less Problem: Hypertension Goal: *Blood pressure within specified parameters Outcome: Progressing Towards Goal 
Goal: *Fluid volume balance Outcome: Progressing Towards Goal 
Goal: *Labs within defined limits Outcome: Progressing Towards Goal 
  
Problem: Diabetes Self-Management Goal: *Disease process and treatment process Description Define diabetes and identify own type of diabetes; list 3 options for treating diabetes. Outcome: Progressing Towards Goal 
Goal: *Incorporating nutritional management into lifestyle Description Describe effect of type, amount and timing of food on blood glucose; list 3 methods for planning meals. Outcome: Progressing Towards Goal 
Goal: *Incorporating physical activity into lifestyle Description State effect of exercise on blood glucose levels. Outcome: Progressing Towards Goal 
Goal: *Developing strategies to promote health/change behavior Description Define the ABC's of diabetes; identify appropriate screenings, schedule and personal plan for screenings. Outcome: Progressing Towards Goal 
Goal: *Using medications safely Description State effect of diabetes medications on diabetes; name diabetes medication taking, action and side effects. Outcome: Progressing Towards Goal 
Goal: *Monitoring blood glucose, interpreting and using results Description Identify recommended blood glucose targets  and personal targets. Outcome: Progressing Towards Goal 
Goal: *Prevention, detection, treatment of acute complications Description List symptoms of hyper- and hypoglycemia; describe how to treat low blood sugar and actions for lowering  high blood glucose level. Outcome: Progressing Towards Goal 
Goal: *Prevention, detection and treatment of chronic complications Description Define the natural course of diabetes and describe the relationship of blood glucose levels to long term complications of diabetes. Outcome: Progressing Towards Goal 
Goal: *Developing strategies to address psychosocial issues Description Describe feelings about living with diabetes; identify support needed and support network Outcome: Progressing Towards Goal 
Goal: *Sick day guidelines Outcome: Progressing Towards Goal 
Goal: *Patient Specific Goal (EDIT GOAL, INSERT TEXT) Outcome: Progressing Towards Goal 
  
Problem: Pain Goal: *Control of Pain Outcome: Progressing Towards Goal 
Goal: *PALLIATIVE CARE:  Alleviation of Pain Outcome: Progressing Towards Goal 
  
Problem: Tissue Perfusion - Cardiopulmonary, Altered Goal: *Optimize tissue perfusion Outcome: Progressing Towards Goal 
Goal: *Absence of hypoxia Outcome: Progressing Towards Goal 
  
Problem: Fluid Volume - Risk of, Imbalanced Goal: *Balanced intake and output Outcome: Progressing Towards Goal 
  
Problem: Patient Education: Go to Patient Education Activity Goal: Patient/Family Education Outcome: Progressing Towards Goal 
  
Problem: Discharge Planning Goal: *Discharge to safe environment Outcome: Progressing Towards Goal 
  
Problem: Gas Exchange - Impaired Goal: *Absence of hypoxia Outcome: Progressing Towards Goal 
  
Problem: Nutrition Deficit Goal: *Optimize nutritional status Outcome: Progressing Towards Goal 
  
Problem: General Wound Care Goal: *Non-infected wound: Improvement of existing wound, absence of infection, and maintenance of skin integrity Outcome: Progressing Towards Goal 
Goal: *Infected Wound: Prevention of further infection and promotion of healing Description Infection control procedures (eg: clean dressings, clean gloves, hand washing, precautions to isolate wound from contamination, sterile instruments used for wound debridement) should be implemented. Outcome: Progressing Towards Goal 
Goal: Interventions Outcome: Progressing Towards Goal 
  
Problem: Patient Education: Go to Patient Education Activity Goal: Patient/Family Education Outcome: Progressing Towards Goal 
  
Problem: Patient Education: Go to Patient Education Activity Goal: Patient/Family Education Outcome: Progressing Towards Goal 
  
Problem: Sepsis: Day 3 Goal: Off Pathway (Use only if patient is Off Pathway) Outcome: Progressing Towards Goal 
Goal: *Central Venous Pressure maintained at 8-12 mm Hg Outcome: Progressing Towards Goal 
Goal: *Oxygen saturation within defined limits Outcome: Progressing Towards Goal 
Goal: *Vital sign stability Outcome: Progressing Towards Goal 
Goal: *Tolerating diet Outcome: Progressing Towards Goal 
Goal: *Demonstrates progressive activity Outcome: Progressing Towards Goal 
Goal: Activity/Safety Outcome: Progressing Towards Goal 
Goal: Consults, if ordered Outcome: Progressing Towards Goal 
Goal: Diagnostic Test/Procedures Outcome: Progressing Towards Goal 
Goal: Nutrition/Diet Outcome: Progressing Towards Goal 
Goal: Discharge Planning Outcome: Progressing Towards Goal 
Goal: Medications Outcome: Progressing Towards Goal 
Goal: Respiratory Outcome: Progressing Towards Goal 
Goal: Treatments/Interventions/Procedures Outcome: Progressing Towards Goal 
Goal: Psychosocial 
Outcome: Progressing Towards Goal 
  
Problem: Sepsis: Day 4 Goal: Off Pathway (Use only if patient is Off Pathway) Outcome: Progressing Towards Goal 
Goal: Activity/Safety Outcome: Progressing Towards Goal 
Goal: Consults, if ordered Outcome: Progressing Towards Goal 
Goal: Diagnostic Test/Procedures Outcome: Progressing Towards Goal 
Goal: Nutrition/Diet Outcome: Progressing Towards Goal 
Goal: Discharge Planning Outcome: Progressing Towards Goal 
Goal: Medications Outcome: Progressing Towards Goal 
Goal: Respiratory Outcome: Progressing Towards Goal 
Goal: Treatments/Interventions/Procedures Outcome: Progressing Towards Goal 
Goal: Psychosocial 
Outcome: Progressing Towards Goal 
Goal: *Oxygen saturation within defined limits Outcome: Progressing Towards Goal 
Goal: *Hemodynamically stable Outcome: Progressing Towards Goal 
Goal: *Vital signs within defined limits Outcome: Progressing Towards Goal 
Goal: *Tolerating diet Outcome: Progressing Towards Goal 
Goal: *Demonstrates progressive activity Outcome: Progressing Towards Goal 
Goal: *Fluid volume maintenance Outcome: Progressing Towards Goal 
  
Problem: Sepsis: Day 5 Goal: Off Pathway (Use only if patient is Off Pathway) Outcome: Progressing Towards Goal 
Goal: *Oxygen saturation within defined limits Outcome: Progressing Towards Goal 
Goal: *Vital signs within defined limits Outcome: Progressing Towards Goal 
Goal: *Tolerating diet Outcome: Progressing Towards Goal 
Goal: *Demonstrates progressive activity Outcome: Progressing Towards Goal 
Goal: *Discharge plan identified Outcome: Progressing Towards Goal 
Goal: Activity/Safety Outcome: Progressing Towards Goal 
Goal: Consults, if ordered Outcome: Progressing Towards Goal 
Goal: Diagnostic Test/Procedures Outcome: Progressing Towards Goal 
 Goal: Nutrition/Diet Outcome: Progressing Towards Goal 
Goal: Discharge Planning Outcome: Progressing Towards Goal 
Goal: Medications Outcome: Progressing Towards Goal 
Goal: Respiratory Outcome: Progressing Towards Goal 
Goal: Treatments/Interventions/Procedures Outcome: Progressing Towards Goal 
Goal: Psychosocial 
Outcome: Progressing Towards Goal 
  
Problem: Sepsis: Day 6 Goal: Off Pathway (Use only if patient is Off Pathway) Outcome: Progressing Towards Goal 
Goal: *Oxygen saturation within defined limits Outcome: Progressing Towards Goal 
Goal: *Vital signs within defined limits Outcome: Progressing Towards Goal 
Goal: *Tolerating diet Outcome: Progressing Towards Goal 
Goal: *Demonstrates progressive activity Outcome: Progressing Towards Goal 
Goal: Influenza immunization Outcome: Progressing Towards Goal 
Goal: *Pneumococcal immunization Outcome: Progressing Towards Goal 
Goal: Activity/Safety Outcome: Progressing Towards Goal 
Goal: Diagnostic Test/Procedures Outcome: Progressing Towards Goal 
Goal: Nutrition/Diet Outcome: Progressing Towards Goal 
Goal: Discharge Planning Outcome: Progressing Towards Goal 
Goal: Medications Outcome: Progressing Towards Goal 
Goal: Respiratory Outcome: Progressing Towards Goal 
Goal: Treatments/Interventions/Procedures Outcome: Progressing Towards Goal 
Goal: Psychosocial 
Outcome: Progressing Towards Goal 
  
Problem: Sepsis: Discharge Outcomes Goal: *Vital signs within defined limits Outcome: Progressing Towards Goal 
Goal: *Tolerating diet Outcome: Progressing Towards Goal 
Goal: *Verbalizes understanding and describes prescribed diet Outcome: Progressing Towards Goal 
Goal: *Demonstrates progressive activity Outcome: Progressing Towards Goal 
Goal: *Describes follow-up/return visits to physicians Outcome: Progressing Towards Goal 
Goal: *Verbalizes name, dosage, time, side effects, and number of days to continue medications Outcome: Progressing Towards Goal 
Goal: *Influenza immunization (Oct-Mar only) Outcome: Progressing Towards Goal 
Goal: *Pneumococcal immunization Outcome: Progressing Towards Goal 
Goal: *Lungs clear or at baseline Outcome: Progressing Towards Goal 
Goal: *Oxygen saturation returns to baseline or 90% or better on room air Outcome: Progressing Towards Goal 
Goal: *Glycemic control Outcome: Progressing Towards Goal 
Goal: *Absence of deep venous thrombosis signs and symptoms(Stroke Metric) Outcome: Progressing Towards Goal 
Goal: *Describes available resources and support systems Outcome: Progressing Towards Goal 
Goal: *Optimal pain control at patient's stated goal 
Outcome: Progressing Towards Goal

## 2019-06-10 NOTE — PROGRESS NOTES
Problem: Discharge Planning Goal: *Discharge to safe environment Outcome: Progressing Towards Goal 
     Plan: SNF Chart reviewed. Plan remains SNF when medically stable In insurance authorization has been obtained. Will cont to follow for further needs. DOROTHY Levy,ext [id number].

## 2019-06-10 NOTE — PROGRESS NOTES
[facility] 
[address] [place], [address] [facility] Dr. Chester Wiggins, Dr. Sam Jimenez, Dr. Shahzad Penn, & Dr. Josh Mitchell [phone] FAX# [phone] Progress Note Patient: Kerline Garcia MRN: [id number]  SSN: [id number] YOB: 1940  Age: [de-identified] y.o. Sex: female Admit Date: 6/3/2019 LOS: 7 days Subjective: BLE PVD with BLE cellulitis, RLE thigh/R breast erythema/ cellulitis, and Severe near occlusion DANIEL stenosis. On BiPAP this AM. Patient poor historian but does follow commands. Currently on Vancomycin and Zosyn. Currently on Heparin drip. Objective:  
 
Vitals:  
 06/10/19 0849 06/10/19 0900 06/10/19 0914 06/10/19 0919 BP:  144/46 Pulse:  71  (!) 109 Resp:  16 Temp:      
SpO2: 100% 100% 100% Weight:      
Height:      
  
Intake and Output: 
Current Shift: 06/10 0701 - 06/10 1900 In: 149 [I.V.:139] Out: 450 [Urine:450] Last three shifts: 06/08 1901 - 06/10 0700 In: 3169.6 [I.V.:2599.6] Out: 4934 [DUEHC:1285] Physical Exam:  
GENERAL: Opens eyes to touch/sound, follows commands/cooperative, mild distress/ on BiPAP HEAD: Facial edema improved EYE: PERRL, non-icteric, EOMIs NECK: supple, symmetric. No JVD CHEST: R breast erythema LUNG: On BiPAP. clear to auscultation bilaterally HEART: regular rate and rhythm ABDOMEN: soft, slight distension LLQ. Bowel sounds normal.  
EXTREMITIES: BLE toes cool but improved. BLE ant tib cellulitis, improved. RLE ant tib small multiple superficial ulcerations with slough at base wrapped in kerlix. RLE lateral/mid thigh erythematous demarcated maculopapular rash, warm to touch. Palpable 2+ BLE femoral pulses. Nonpalpable BLE DP/PT pulses, audible LLE PT and RLE DP signal. NTTP BLE. Palpable 2+ BUE radial pulses. NEUROLOGIC: LUE  strength 1/5, RUE 5/5. Motor BLE toes 5/5. Lab/Data Review: 
BMP:  
Lab Results Component Value Date/Time  06/10/2019 01:30 AM  
 K 3.8 06/10/2019 11:10 AM  
  06/10/2019 01:30 AM  
 CO2 26 06/10/2019 01:30 AM  
 AGAP 10 06/10/2019 01:30 AM  
  (H) 06/10/2019 01:30 AM  
 BUN 50 (H) 06/10/2019 01:30 AM  
 CREA 2.18 (H) 06/10/2019 01:30 AM  
 GFRAA 26 (L) 06/10/2019 01:30 AM  
 GFRNA 22 (L) 06/10/2019 01:30 AM  
 
CBC:  
Lab Results Component Value Date/Time WBC 14.5 (H) 06/10/2019 01:30 AM  
 HGB 7.1 (L) 06/10/2019 01:30 AM  
 HCT 21.6 (L) 06/10/2019 01:30 AM  
  06/10/2019 01:30 AM  
  
CT HEAD WO 6/8/19: Interval developing 2 adjacent nonspecific right corona radiata low densities. Differential considerations include microvascular ischemic white matter change, demyelination, and white matter infarct CULTURE BLOOD 6/6/19: No growth CULTURE BLOOD 6/3/19:  
Luz Marina Gitelman STAIN      Final  
GRAM POSITIVE COCCI IN PAIRS IN CLUSTERS ANAEROBIC BOTTLE   
GRAM STAIN      Final  
SMEAR CALLED TO AND CORRECTLY REPEATED BY: KADE CHAND RN IN 2600 ON 6/3/19 AT 2220 WF   
GRAM STAIN      Final  
AEROBIC BOTTLE GRAM POSITIVE COCCI IN PAIRS IN CLUSTERS   
GRAM STAIN      Final  
SMEAR CALLED TO AND CORRECTLY REPEATED BY: KADE CHAND RN,ICU, ON 6/4/19 AT 0348 TO [facility] Culture result: Abnormal       Final  
AEROBIC AND ANAEROBIC BOTTLES STAPHYLOCOCCUS EPIDERMIDIS   
 
DUPLEX CAROTID BILATERAL 6/3/19: Limited/technically difficult study 1. Near right ICA occlusion 2. Left 50-69% 
  Right PSV Right EDV Left PSV Left EDV  
CCA Prox 58.6 cm/s  
  
 9 cm/s  
  
 125.8 cm/s  
  
 27.2 cm/s CCA Mid 65.1 cm/s  
  
 7.7 cm/s  
  
 87.5 cm/s  
  
 23.6 cm/s CCA Dist 74.2 cm/s  
  
 6.4 cm/s  
  
 125.8 cm/s  
  
 23.6 cm/s ICA Prox    
    
 186.2 cm/s  
  
 51.5 cm/s ICA Mid 21.1 cm/s  
  
 0 cm/s  
  
 183.6 cm/s  
  
 49 cm/s ICA Dist 30.3 cm/s  
  
 6.2 cm/s  
  
 157.7 cm/s  
  
 59.3 cm/s ICA/CCA Ratio 0.4   
  
   1.5   
  
    
.7 cm/s 0 cm/s  
  
 118.9 cm/s  
  
 0 cm/s Vertebral 75.5 cm/s  
  
 15.5 cm/s  
  
 95.5 cm/s  
  
 17.8 cm/s Subclavian 213 cm/s  
  
 0 cm/s  
  
 246.2 cm/s  
  
 0 cm/s  
  
  
  
DUPLEX BLE ARTERIAL 6/3/19: Limited/technically difficult study 1. Significant bilateral peripheral arterial disease in the lower extremities. 2. Moderate stenosis in the right common femoral artery. 3. Absent right posterior tibial artery consistent with occlusion. 4. Absent left distal popliteal, posterior tibial and anterior tibial arteries consistent with occlusion. 
  
Assessment:  
 
Principal Problem: 
  Sepsis ([address] (6/3/2019) Active Problems: 
  hypothyroid () Essential hypertension (9/3/2015) Type 2 diabetes with nephropathy ([address] (3/23/2018) Severe obesity ([address] (5/9/2019) Hypoglycemia (6/3/2019) PAD (peripheral artery disease) ([address] (6/3/2019) Acute respiratory failure ([address] (6/3/2019) Cellulitis (6/3/2019) Acute on chronic combined systolic and diastolic heart failure ([address] (6/4/2019) Anemia (6/8/2019) Acute encephalopathy (6/9/2019) BLE PVD with BLE cellulitis RLE thigh/R breast erythema/ cellulitis Severe near occlusion DANIEL stenosis Moderate LICA Stenosis Syncope EMMETT Acute Resp failure Sepsis AMS Hypoglycemia - resolved HTN 
DM 
Obesity Plan:  
 
Would need CO2 Aortogram with BLE r/o attn LLE and possible intervention - non-emergent, Chronic PVD with audible signals to feet and worsening EMMETT Continue heparin drip for now Continue Abx Neurovascular checks Local wound care Needs CTA Head/Neck to evaluate DANIEL patency - would clear with nephrology if ok for contrast   
 
Signed By: NESHA Haas   
 [place] 10, 2019

## 2019-06-10 NOTE — PROGRESS NOTES
Problem: Falls - Risk of 
Goal: *Absence of Falls Description Document Ava Monterroso Fall Risk and appropriate interventions in the flowsheet. Outcome: Progressing Towards Goal 
  
Problem: Pressure Injury - Risk of 
Goal: *Prevention of pressure injury Description Document Tate Scale and appropriate interventions in the flowsheet. Outcome: Progressing Towards Goal 
  
Problem: Hypertension Goal: *Blood pressure within specified parameters Outcome: Progressing Towards Goal

## 2019-06-10 NOTE — PROGRESS NOTES
0000,PT placed on BIPA of 16/8,30% O2sats 100% resting with eyes open will monitor though the night. 0030 attempted CPT with vest pt unable to tolerate at this time c/o of pain.

## 2019-06-10 NOTE — PROGRESS NOTES
RENAL PROGRESS NOTE Sj Morales Assessment/Plan: · EMMETT (secondary to bl le cellulitis/sepsis). Scr is essentially the same, good uo in response to lasix drip. · Diabetic kidney disease/proteinuria. · Hypokalemia. Continue replacement. · HFpEF/volume overload. Improved, continue diuresis. Watch scr/bun. · Hypoxic resp failure. · BL le cellulitis/sepsis in a setting of pad/chronic venous insufficiency. On abx. · HTN. Controlled, continue amlodipine and po metoprolol. D/c iv metoprolol. · UTI? On abx. · Hypoglycemia with altered ms on admission. · Anemia. Mgmt per primary team.                                                                                                                               
Subjective:Patient complaints off: Alert but slow to respond. Tolerates tf. No SOB/CP/N/V. Patient Active Problem List  
Diagnosis Code  Edema R60.9  Cough R05  Hypercholesterolemia E78.00  Fibromyalgia M79.7  hypothyroid E07.9  Dermatitis L30.9  Essential hypertension I10  Type 2 diabetes mellitus with mild nonproliferative diabetic retinopathy without macular edema ([facility] G98.9411  Type 2 diabetes with nephropathy ([facility]) E11.21  
 Severe obesity ([facility]) E66.01  
 Hypoglycemia E16.2  PAD (peripheral artery disease) ([facility]) I73.9  Sepsis ([facility] A41.9  Acute respiratory failure ([facility]) J96.00  
 Cellulitis L03.90  Acute on chronic combined systolic and diastolic heart failure ([facility]) I50.43  Anemia D64.9  Acute encephalopathy G93.40 Current Facility-Administered Medications Medication Dose Route Frequency Provider Last Rate Last Dose  dextrose 5% infusion  25 mL/hr IntraVENous CONTINUOUS Daisy Charles MD 25 mL/hr at 06/10/19 0832 25 mL/hr at 06/10/19 0832  
 [START ON 6/11/2019] insulin glargine (LANTUS) injection 5 Units  5 Units SubCUTAneous DAILY Matt Willis MD      
  furosemide (LASIX) 100 mg in 0.9% sodium chloride 100 mL infusion  15 mg/hr IntraVENous CONTINUOUS Isael Crystal MD 15 mL/hr at 06/10/19 0734 15 mg/hr at 06/10/19 0734  
 amLODIPine (NORVASC) tablet 5 mg  5 mg Oral DAILY NESHA Baxter   5 mg at 06/10/19 8636  potassium chloride (K-DUR, KLOR-CON) SR tablet 40 mEq  40 mEq Oral BID NESHA Baxter   40 mEq at 06/10/19 0919  
 0.9% sodium chloride infusion 250 mL  250 mL IntraVENous PRN Rogelio Ragland MD      
 ELECTROLYTE REPLACEMENT PROTOCOL - Potassium Standard  Dosing Details for Fluid Restricted Patients  1 Each Other PRAISHA Ragland MD      
 ELECTROLYTE REPLACEMENT PROTOCOL - Magnesium   1 Each Other PRAISHA Ragland MD      
 ELECTROLYTE REPLACEMENT PROTOCOL - Phosphorus  Standard Dosing  1 Each Other PRAISHA Ragland MD      
 ELECTROLYTE REPLACEMENT PROTOCOL - Calcium   1 Each Other PRAISHA Ragland MD      
 polyethylene glycol [facility] packet 17 g  17 g Oral DAILY Lee Berrios [place]   17 g at 06/10/19 0919  
 nystatin (MYCOSTATIN) 100,000 unit/mL oral suspension 500,000 Units  500,000 Units Oral QID NESHA Baxter   500,000 Units at 06/10/19 0919  
 insulin lispro (HUMALOG) injection   SubCUTAneous AC&HS Rogelio Ragland MD   Stopped at 06/06/19 2300  hydrALAZINE (APRESOLINE) 20 mg/mL injection 20 mg  20 mg IntraVENous Q4H PRN Rogelio Ragland MD   20 mg at 06/07/19 2994  dextrose 10% infusion 125-150 mL  125-150 mL IntraVENous PRN Rogelio Ragland  mL/hr at 06/08/19 1907 250 mL at 06/08/19 1907  acetaminophen (TYLENOL) tablet 650 mg  650 mg Oral Q4H PRN NESHA Baxter   650 mg at 06/06/19 1319  
 metoprolol (LOPRESSOR) injection 5 mg  5 mg IntraVENous Q6H Tl Rod PA   5 mg at 06/10/19 0919  
 sodium chloride (NS) flush 5-10 mL  5-10 mL IntraVENous PRN Coleen Cardona MD   10 mL at 06/04/19 2206  heparin 25,000 units in D5W 250 ml infusion  18-36 Units/kg/hr IntraVENous TITRATE Merced Johnson MD 4.5 mL/hr at 06/10/19 0730 4 Units/kg/hr at 06/10/19 0730  levothyroxine (SYNTHROID) tablet 112 mcg  112 mcg Oral ACB Merced Johnson MD   112 mcg at 06/10/19 0546  
 glucose chewable tablet 16 g  4 Tab Oral PRN OgboCaitlyn willson I, NP   16 g at 06/08/19 1711  
 glucagon (GLUCAGEN) injection 1 mg  1 mg IntraMUSCular PRN Ogbolu, Caitlyn I, NP      
 pantoprazole (PROTONIX) injection 40 mg  40 mg IntraVENous DAILY Ogbolu Caitlyn I, NP   40 mg at 06/10/19 5719 Objective Vitals:  
 06/10/19 0849 06/10/19 0900 06/10/19 0914 06/10/19 0919 BP:  144/46 Pulse:  71  (!) 109 Resp:  16 Temp:      
SpO2: 100% 100% 100% Weight:      
Height:      
 
 
 
Intake/Output Summary (Last 24 hours) at 6/10/2019 1016 Last data filed at 6/10/2019 0900 Gross per 24 hour Intake 2322.38 ml Output 3395 ml Net -1072.62 ml Admission weight: Weight: 113.4 kg (250 lb) (06/03/19 0140) Last Weight Metrics: 
Weight Loss Metrics 6/10/2019 5/9/2019 9/20/2018 3/23/2018 8/4/2017 2/23/2017 10/6/2016 Today's Wt 259 lb 11.2 oz 235 lb 207 lb 3.2 oz 209 lb 3.2 oz 195 lb 198 lb 4.8 oz 198 lb BMI 43.22 kg/m2 39.11 kg/m2 34.48 kg/m2 34.81 kg/m2 32.45 kg/m2 33 kg/m2 32.95 kg/m2 Physical Assessment:  
 
General: NAD, alert and oriented x1. Neck: No jvd. Rt ij central line. LUNGS: diminished air entry at the bases, no crackles. CVS EXM: S1, S2  RRR. Abdomen: soft, non tender. Lower Extremities:  1-2 bl le  edema. Pretibial skin is wrinkly, less erythematous. Rt shin dressings intact. Lab CBC w/Diff Recent Labs  
  06/10/19 
0130 06/09/19 
0440 06/08/19 
0400 WBC 14.5* 14.0* 11.4 RBC 2.52* 2.90* 2.37* HGB 7.1* 8.2* 6.5* HCT 21.6* 24.8* 20.3*  195 177 GRANS 78* 75* 73 LYMPH 11* 13* 14* EOS 3 2 1 Chemistry Recent Labs  
  06/10/19 
0130 06/09/19 97 70 84 06/09/19 
0440 06/08/19 
1630 06/08/19 
0400 *  --  89 75 64*   --  138 140 138  
K 3.8 3.2* 3.1* 2.9* 3.1*  
  --  103 105 105 CO2 26  --  25 26 26 BUN 50*  --  50* 49* 49* CREA 2.18*  --  2.12* 2.10* 1.84* CA 7.9*  --  8.2* 7.8* 8.4* AGAP 10  --  10 9 7 BUCR 23*  --  24* 23* 27* ALB 3.0*  --  3.6  --  3.3*  
PHOS 4.0  --  3.6  --  3.8 No results found for: IRON, FE, TIBC, IBCT, PSAT, FERR Lab Results Component Value Date/Time Calcium 7.9 (L) 06/10/2019 01:30 AM  
 Phosphorus 4.0 06/10/2019 01:30 AM  
  
 
Sherrine Snellen, M.D. [facility] Phone [phone] Pager [phone]

## 2019-06-10 NOTE — PROGRESS NOTES
Internal Medicine Progress Note Patient's Name: Franky Pham Admit Date: 6/3/2019 Length of Stay: 7 Assessment/Plan Principal Problem: 
  Sepsis ([address] (6/3/2019) Active Problems: 
  PAD (peripheral artery disease) ([address] (6/3/2019) Acute encephalopathy (6/9/2019) Acute on chronic combined systolic and diastolic heart failure ([address] (6/4/2019) 
 
  hypothyroid () Essential hypertension (9/3/2015) Type 2 diabetes with nephropathy ([address] (3/23/2018) Severe obesity ([address] (5/9/2019) Hypoglycemia (6/3/2019) Acute respiratory failure ([address] (6/3/2019) Cellulitis (6/3/2019) Anemia (6/8/2019) Transfer out of ICU Sepsis - possible 2/2 BLE cellulitis, PNA? 
- PCCM consulted - appreciate - IV abx: vanc/zosyn 
- leukocytosis - wound care for BLE 
- 1/2 BCx 6/3 positive for coag neg staph 
- repeat BCx NGTD 
  
Acute encephalopathy  
-most likely multifactorial due to metabolic conditions, maybe hemorrhagic  
-CT head 6/8: ddx includes microvascular ischemic change, demylination, and white matter infacrt 
-Hold off from MRI brain w/contrast until kidneys improve 
  
Acute Resp Failure 
- bipap, NC as tolerated - tx CHF 
  
Diabetes - Hypoglycemic this morning 
-mentation improving with glucose ggt 
- monitor BG 
- lantus/SSI 
  
Acute on chronic CHF 
- BNP 62072, left pleural effusion 
- monitor BMP 
- echo done, results below 
- strict I&Os -nephrology consulted - appreciate services 
-potential dialysis vs ultrafiltration 
  
PAD with arterial occlusion - VS consulted - appreciate -  BLE duplex - b/l PAD, mod stenosis in R CF artery, absent R PT artery consistent with occlusion, absent L distal pop/PT/AT arteries consistent with occlusion. 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 
- Hep gtt 
- no emergent vascular intervention - pt unstable 
- will eventually need aortogram with BLE r/o with possible intervention per VS 
  
EMMETT 
- monitor BMP - creatinine rising 
-nephrology consulted - appreciate services -minimal urine output overnight 
  
HTN 
- monitor  
- add PO meds 
  
Anemia 
-blood transfusion 
  
- Cont acceptable home medications for chronic conditions  
- DVT protocol I have personally reviewed all pertinent labs and films that have officially resulted over the last 24 hours. I have personally checked for all pending labs that are awaiting final results. Interval History Per H&P, \"Nette Lama is a [de-identified] y.o. female who was found down by her son. Erin Galo had weakness and altered mental status.  She also had SOB.  She has a known history of Diabetes.  In the ER she was also found to have hypoglycemia. Erin Galo is noted to have decreased pulses bilaterally.  She also was found to have significant respiratory distress and was started on BiPap.  Vascular surgery was called because of decreased perfusion to both feet.  She has severe erythema involving both feet as well as her abdomen and below her breast. Elfreda Sriram is significant concern for sepsis along with her acute respiratory failure.  She is admitted for ongoing management. \" 
  
Hypoglycemia initially managed with D10 bolus and D20 infusion. Vascular surgery recommended heparin gtt and imaging. BLE duplex - b/l PAD, mod stenosis in R CF artery, absent R PT artery consistent with occlusion, absent L distal pop/PT/AT arteries consistent with occlusion. 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 No emergent surgical intervention per VS until patient stabilizes. Wound Care consulted. Patient also found to be in heart failure, bumex started. BCx 6/3 positive for coag neg staph in 1/2 bottles. Repeat BCx done 6/6. Creatinine noted to be rising gradually. PT/OT recommending SNF. ST recs puree/nectar thick liquids. Transferred out of ICU 6/10 Subjective Pt s/e @ bedside. Remains slow to respond, minimal improvement of mental status,  Denies SOB, chest pain Objective Visit Vitals /48 Pulse 67 Temp 98.1 °F (36.7 °C) Resp 18 Ht 5' 5\" (1.651 m) Wt 117.8 kg (259 lb 11.2 oz) SpO2 100% BMI 43.22 kg/m² Physical Exam: 
General Appearance: NAD, conversant HENT: normocephalic/atraumatic, moist mucus membranes Neck: No JVD, supple Lungs: CTA with normal respiratory effort CV: RRR, no m/r/g Abdomen: soft, non-tender, normal bowel sounds Extremities: no cyanosis, anasarca, pitting edema Neuro: decreased strength LUE, minimal  strength on left, normal right, able to wiggle toes Intake and Output: 
Current Shift:  06/10 0701 - 06/10 1900 In: 1531.4 [I.V.:441.4] Out: 450 [Urine:450] Last three shifts:  06/08 1901 - 06/10 0700 In: 3169.6 [I.V.:2599.6] Out: 5618 [QPDXQ:0271] Lab/Data Reviewed: All lab results for the last 24 hours reviewed. Imaging Reviewed: 
No results found. Medications Reviewed: 
Current Facility-Administered Medications Medication Dose Route Frequency  dextrose 5% infusion  25 mL/hr IntraVENous CONTINUOUS  
 [START ON 6/11/2019] insulin glargine (LANTUS) injection 5 Units  5 Units SubCUTAneous DAILY  potassium chloride (KLOR-CON) packet for solution 40 mEq  40 mEq Oral BID WITH MEALS  metoprolol (LOPRESSOR) injection 5 mg  5 mg IntraVENous Q6H PRN  
 [START ON 6/11/2019] pantoprazole (PROTONIX) 40 mg in sodium chloride 0.9% 10 mL injection  40 mg IntraVENous DAILY  potassium chloride 10 mEq in 100 ml IVPB  10 mEq IntraVENous ONCE  
 furosemide (LASIX) 100 mg in 0.9% sodium chloride 100 mL infusion  15 mg/hr IntraVENous CONTINUOUS  
 amLODIPine (NORVASC) tablet 5 mg  5 mg Oral DAILY  0.9% sodium chloride infusion 250 mL  250 mL IntraVENous PRN  
 ELECTROLYTE REPLACEMENT PROTOCOL - Potassium Standard  Dosing Details for Fluid Restricted Patients  1 Each Other PRN  
 ELECTROLYTE REPLACEMENT PROTOCOL - Magnesium   1 Each Other PRN  
 ELECTROLYTE REPLACEMENT PROTOCOL - Phosphorus  Standard Dosing  1 Each Other PRN  
  ELECTROLYTE REPLACEMENT PROTOCOL - Calcium   1 Each Other PRN  polyethylene glycol (MIRALAX) packet 17 g  17 g Oral DAILY  nystatin (MYCOSTATIN) 100,000 unit/mL oral suspension 500,000 Units  500,000 Units Oral QID  insulin lispro (HUMALOG) injection   SubCUTAneous AC&HS  hydrALAZINE (APRESOLINE) 20 mg/mL injection 20 mg  20 mg IntraVENous Q4H PRN  
 dextrose 10% infusion 125-150 mL  125-150 mL IntraVENous PRN  
 acetaminophen (TYLENOL) tablet 650 mg  650 mg Oral Q4H PRN  
 sodium chloride (NS) flush 5-10 mL  5-10 mL IntraVENous PRN  
 heparin 25,000 units in D5W 250 ml infusion  18-36 Units/kg/hr IntraVENous TITRATE  levothyroxine (SYNTHROID) tablet 112 mcg  112 mcg Oral ACB  glucose chewable tablet 16 g  4 Tab Oral PRN  
 glucagon (GLUCAGEN) injection 1 mg  1 mg IntraMUSCular PRN Claus Gibbs PA-C [facility] Pager: [phone] Office: [phone]

## 2019-06-10 NOTE — ROUTINE PROCESS
Bedside shift change report given to Chayo Smith RN and Nelda Waller RN (oncoming nurse) by [address] and Andreea Shirley RN (offgoing nurse). Report included the following information SBAR, Kardex, Procedure Summary, Intake/Output, MAR, Recent Results, Med Rec Status, Cardiac Rhythm A fIb and Alarm Parameters .

## 2019-06-10 NOTE — DIABETES MGMT
NUTRITIONAL Re- ASSESSMENT GLYCEMIC CONTROL/ PLAN OF CARE Nara Castro           [de-identified] y.o.           6/3/2019 1. Acute metabolic encephalopathy due to hypoglycemia 2. Hypothermia, initial encounter 3. Acute congestive heart failure, unspecified heart failure type ([address] 4. Acute respiratory failure with hypercapnia (HCC) 5. Arterial occlusion, lower extremity (HCC)   
DM, cellulitis INTERVENTIONS/PLAN:  
1. Advance Osmolite 1.2 camryn tube feeding as tolerated to goal rate of 55ml/hour. Tube feeding will provide 1584 calories,  73g protein/d with 1.1 liters free water/d from TF.  
2.  Suggest lowering Lantus to 5 units/day to help maintain blood glucose > 70 mg/dL. ASSESSMENT:  
Nutritional Status: 
Pt is overweight related to excess caloric intake as evidenced by 195% ideal weight and BMI= 40.5kg/m2. Pt meets criteria for morbid obesity. Diagnosis-Intake: Inadequate protein-energy intake related to respiratory distress AEB inability to take in oral nourishment , did not pass SLP evaluation. Diabetes Management: Blood glucose stable today. Recent blood glucose:    
 
Lab Results Component Value Date/Time  (H) 06/10/2019 01:30 AM  
 GLUCPOC 149 (H) 06/10/2019 11:24 AM  
 GLUCPOC 184 (H) 06/10/2019 06:30 AM  
 GLUCPOC 147 (H) 06/10/2019 12:40 AM  
 
6/9/19:  83 to 136 mg/dL 
6/8/19:  64 to 109 mg/dL Within target range (non-ICU: <140; ICU<180): [x] Yes   []  No 
Current Insulin regimen: 12 units Lantus plus corrective lispro Home medication/insulin regimen:  
Key Antihyperglycemic Medications   
    
  
 glipiZIDE SR (GLUCOTROL XL) 5 mg CR tablet TAKE TWO TABLETS BY MOUTH EVERY MORNING AND TAKE ONE TABLET BY MOUTH EVERY EVENING WITH MEALS HbA1c:5.2 % Adequate glycemic control PTA:  [x] Yes  [] No 
  
SUBJECTIVE/OBJECTIVE: Information obtained from:ICU rounds Skin documentation reviewed. 6/10:  Pt on Bipap at night and NC during day. Receiving K replacement. Diet: NPO with Osmolite 1.2 camryn tube feeding currently infusing at 20 ml/hr with goal rate of 55 ml/hr. Last BM - 6/9 per nursing documentation Medications: [x]                Reviewed Labs:  
Lab Results Component Value Date/Time Hemoglobin A1c 5.2 06/04/2019 05:00 AM  
not current Lab Results Component Value Date/Time Sodium 138 06/10/2019 01:30 AM  
 Potassium 3.8 06/10/2019 11:10 AM  
 Chloride 102 06/10/2019 01:30 AM  
 CO2 26 06/10/2019 01:30 AM  
 Anion gap 10 06/10/2019 01:30 AM  
 Glucose 137 (H) 06/10/2019 01:30 AM  
 BUN 50 (H) 06/10/2019 01:30 AM  
 Creatinine 2.18 (H) 06/10/2019 01:30 AM  
 Calcium 7.9 (L) 06/10/2019 01:30 AM  
 Phosphorus 4.0 06/10/2019 01:30 AM  
 Albumin 3.0 (L) 06/10/2019 01:30 AM  
 
 
Anthropometrics: IBW : 56.7 kg (125 lb), % IBW (Calculated): 194.71 %, BMI (calculated): 43.2 Wt Readings from Last 1 Encounters:  
06/10/19 117.8 kg (259 lb 11.2 oz) Ht Readings from Last 1 Encounters:  
06/08/19 5' 5\" (1.651 m) Estimated Nutrition Needs:  1550 Kcals/day, Protein (g): 75 g Fluid (ml): 1800 ml Based on:   []          Actual BW    [x]          IBW   []            Adjusted BW   
 
Nutrition Diagnoses:  
   Pt is overweight related to excess caloric intake as evidenced by 194% ideal weight and BMI=40.5 kg/m2. Pt meets criteria for morbid  Obesity. A1c=5.2% equivalent  to ave Blood Glucose of 96 mg/dl for 2-3 months prior to admission, indicating pt may need a reduction of her glipizide at discharge. Difficulty swallowing due to dysphagia as evidenced by SLP assessment with recommendation for NPO and alternative source of nutrition. Nutrition Interventions: Tube Feeding and insulin Recommendations Goal:  
Intake will meet 75% of energy and protein requirements by 6/12. Blood glucose will be within target range of  mg/dL by 6/10/19. 6/10:  Not met but progressing, 
  Gradual weight loss post discharge 1 lb per week by 6/17. Nutrition Monitoring and Evaluation []     Monitor po intake on meal rounds 
[x]     Continue inpatient monitoring and intervention 
[]     Other: 
 
 
Nutrition Risk:  [x]   High     []  Moderate    []  Minimal/Uncompromised Melissa Lan RD, CDE Office:  [phone] [place] Pager:  [phone]

## 2019-06-11 NOTE — PROGRESS NOTES
Internal Medicine Progress Note Patient's Name: Nara Castro Admit Date: 6/3/2019 Length of Stay: 8 Assessment/Plan Principal Problem: 
  Sepsis ([address] (6/3/2019) Active Problems: 
  PAD (peripheral artery disease) ([address] (6/3/2019) Acute encephalopathy (6/9/2019) Acute on chronic combined systolic and diastolic heart failure ([address] (6/4/2019) 
 
  hypothyroid () Essential hypertension (9/3/2015) Type 2 diabetes with nephropathy ([address] (3/23/2018) Severe obesity ([address] (5/9/2019) Hypoglycemia (6/3/2019) Acute respiratory failure ([address] (6/3/2019) Cellulitis (6/3/2019) Anemia (6/8/2019) Sepsis - possible 2/2 BLE cellulitis, PNA? 
- PCCM consulted - appreciate - IV abx: vanc/zosyn 
- leukocytosis - wound care for BLE 
- 1/2 BCx 6/3 positive for coag neg staph 
- repeat BCx NGTD 
  
Acute encephalopathy  
-most likely multifactorial due to metabolic conditions, maybe hemorrhagic  
-CT head 6/8: ddx includes microvascular ischemic change, demylination, and white matter infacrt 
-improving 
  
Acute Resp Failure 
- bipap, NC as tolerated - tx CHF 
  
Diabetes - Hypoglycemic this morning 
-mentation improving with glucose ggt 
- monitor BG 
- lantus/SSI 
  
Acute on chronic CHF 
- BNP 63605, left pleural effusion 
- monitor BMP 
- echo done, results below 
- strict I&Os -nephrology consulted - appreciate services 
-potential dialysis vs ultrafiltration 
  
PAD with arterial occlusion - VS consulted - appreciate -  BLE duplex - b/l PAD, mod stenosis in R CF artery, absent R PT artery consistent with occlusion, absent L distal pop/PT/AT arteries consistent with occlusion. 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 
- Hep gtt 
- no emergent vascular intervention - pt unstable 
- will eventually need aortogram with BLE r/o with possible intervention per VS 
  
EMMETT 
- monitor BMP 
- creatinine rising 
-nephrology consulted - appreciate services -minimal urine output overnight 
  
HTN 
- monitor  
 
Anemia 
-blood transfusion 
 
 
- Cont acceptable home medications for chronic conditions  
- DVT protocol I have personally reviewed all pertinent labs and films that have officially resulted over the last 24 hours. I have personally checked for all pending labs that are awaiting final results. Interval History Per H&P, \"Nette Lama is a [de-identified] y.o. female who was found down by her son. Cullen Soto had weakness and altered mental status.  She also had SOB.  She has a known history of Diabetes.  In the ER she was also found to have hypoglycemia. Cullen Soto is noted to have decreased pulses bilaterally.  She also was found to have significant respiratory distress and was started on BiPap.  Vascular surgery was called because of decreased perfusion to both feet.  She has severe erythema involving both feet as well as her abdomen and below her breast. Spencer Rose is significant concern for sepsis along with her acute respiratory failure.  She is admitted for ongoing management. \" 
  
Hypoglycemia initially managed with D10 bolus and D20 infusion. Vascular surgery recommended heparin gtt and imaging. BLE duplex - b/l PAD, mod stenosis in R CF artery, absent R PT artery consistent with occlusion, absent L distal pop/PT/AT arteries consistent with occlusion. 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 No emergent surgical intervention per VS until patient stabilizes. Wound Care consulted. Patient also found to be in heart failure, bumex started. BCx 6/3 positive for coag neg staph in 1/2 bottles. Repeat BCx done 6/6. Creatinine noted to be rising gradually. PT/OT recommending SNF. ST recs puree/nectar thick liquids. Transferred out of ICU 6/10 Subjective Pt s/e @ bedside. No major events overnight Leukocytosis worsened but mentation improved. Clinically looks better. Worked with Pt/OT today, knew her name and where she was Objective Visit Vitals /75 Pulse 83 Temp 98.3 °F (36.8 °C) Resp 18 Ht 5' 5\" (1.651 m) Wt 115.7 kg (255 lb) SpO2 94% BMI 42.43 kg/m² Physical Exam: 
General Appearance: NAD, conversant, anasarca HENT: normocephalic/atraumatic, moist mucus membranes Neck: No JVD, supple Lungs: CTA with normal respiratory effort CV: RRR, no m/r/g Abdomen: soft, non-tender, normal bowel sounds Extremities: no cyanosis, diffuse edema Neuro: No focal deficits, motor/sensory intact, a/o x 3, improved mentation. Was able to follow commands,  Minimal  strength left hand, minimal movement LUE/LLE. Right sided nml Intake and Output: 
Current Shift:  06/11 0701 - 06/11 1900 In: 195 Out: 1600 [Urine:1600] Last three shifts:  06/09 1901 - 06/11 0700 In: 6715 [I.V.:2269] Out: 3294 [QSTGR:7774] Lab/Data Reviewed: All lab results for the last 24 hours reviewed. Imaging Reviewed: 
No results found. Medications Reviewed: 
Current Facility-Administered Medications Medication Dose Route Frequency  insulin lispro (HUMALOG) injection   SubCUTAneous Q6H  
 dextrose 5% infusion  25 mL/hr IntraVENous CONTINUOUS  
 insulin glargine (LANTUS) injection 5 Units  5 Units SubCUTAneous DAILY  potassium chloride (KLOR-CON) packet for solution 40 mEq  40 mEq Oral BID WITH MEALS  metoprolol (LOPRESSOR) injection 5 mg  5 mg IntraVENous Q6H PRN  pantoprazole (PROTONIX) 40 mg in sodium chloride 0.9% 10 mL injection  40 mg IntraVENous DAILY  furosemide (LASIX) 100 mg in 0.9% sodium chloride 100 mL infusion  15 mg/hr IntraVENous CONTINUOUS  
 amLODIPine (NORVASC) tablet 5 mg  5 mg Oral DAILY  0.9% sodium chloride infusion 250 mL  250 mL IntraVENous PRN  polyethylene glycol (MIRALAX) packet 17 g  17 g Oral DAILY  nystatin (MYCOSTATIN) 100,000 unit/mL oral suspension 500,000 Units  500,000 Units Oral QID  hydrALAZINE (APRESOLINE) 20 mg/mL injection 20 mg  20 mg IntraVENous Q4H PRN  
  dextrose 10% infusion 125-150 mL  125-150 mL IntraVENous PRN  
 acetaminophen (TYLENOL) tablet 650 mg  650 mg Oral Q4H PRN  
 sodium chloride (NS) flush 5-10 mL  5-10 mL IntraVENous PRN  
 heparin 25,000 units in D5W 250 ml infusion  18-36 Units/kg/hr IntraVENous TITRATE  levothyroxine (SYNTHROID) tablet 112 mcg  112 mcg Oral ACB  glucose chewable tablet 16 g  4 Tab Oral PRN  
 glucagon (GLUCAGEN) injection 1 mg  1 mg IntraMUSCular PRN Cornell Wesley PA-C [address] [place], [place] [address]pec[facility] [facility] Hospitalist Division Pager: [phone] Office: [phone]

## 2019-06-11 NOTE — PROGRESS NOTES
Problem: Self Care Deficits Care Plan (Adult) Goal: *Acute Goals and Plan of Care (Insert Text) Description Occupational Therapy Goals Initiated 6/6/2019 within 7 day(s). 1.  Patient will perform grooming tasks at EOB with moderate assistance and minimal verbal cues for attention to task. 2.  Patient will perform upper body dressing with moderate assistance and fair dynamic sitting balance. 3.  Patient will perform functional task at EOB for 8 minutes with minimal assistance for balance and < 5 rest breaks to increase activity tolerance for ADLs. 4.  Patient will perform toilet transfers with maximum assistance. 5.  Patient will perform all aspects of toileting with maximum assistance. Tarik Khan 6.  Patient will participate in upper extremity therapeutic exercise/activities for 8 minutes with minimal assistance to increase BUE strength/AROM for functional transfers and ADLs. 7.  Patient will utilize energy conservation techniques during functional activities with minimal verbal cues. Outcome: Progressing Towards Goal 
 OCCUPATIONAL THERAPY TREATMENT Patient: Udell Nyhan (76 y.o. female) Date: 6/11/2019 Diagnosis: Hypoglycemia [E16.2] PAD (peripheral artery disease) ([facility] [I73.9] Diabetes ([facility] [E11.9] Sepsis ([facility] Precautions: Fall PLOF: Independent Chart, occupational therapy assessment, plan of care, and goals were reviewed. ASSESSMENT: 
Pt now out of ICU. Pt more alert, oriented to person and place. Pt w/pitting edema BUE, R > L. Performs UE TherEx and retrograde massage in prep for simple ADL grooming tasks at bed level. Generalized weakness requires handover hand and Max Assist w/oral care using toothette. Pt requesting water, educated on NPO status. Encouraged UE TherEx throughout the day. BUE elevated at end of session. Progression toward goals: 
?          Improving appropriately and progressing toward goals ? Improving slowly and progressing toward goals ?          Not making progress toward goals and plan of care will be adjusted PLAN: 
Patient continues to benefit from skilled intervention to address the above impairments. Continue treatment per established plan of care. Discharge Recommendations:  Skilled Nursing Facility/LTC Further Equipment Recommendations for Discharge:  hospital bed and wheelchair SUBJECTIVE:  
Patient stated ? July 15th. ? OBJECTIVE DATA SUMMARY:  
Cognitive/Behavioral Status: 
Neurologic State: Alert Orientation Level: Oriented to person, Oriented to place(place \"medical facility\") Cognition: Appropriate for age attention/concentration Safety/Judgement: Insight into deficits ADL Intervention: 
Grooming Brushing Teeth: Maximum assistance(w/LUE and hand over hand assist using toothette) UE Therapeutic Exercises: PROM RUE shoulder flexion, horizontal abduction to ~ 90 degrees, horizontal adduction PROM RUE elbow/wrist flexion/extension AAROM > PROM LUE shoulder flexion, horizontal ab/adduction AAROM LUE elbow/wrist flexion/extension Pain: 
Pain level pre-treatment: 0/10 Pain level post-treatment: 0/10 PT c/o BUE shoulder pain, L > R w/minimal movement Activity Tolerance:   
Poor Please refer to the flowsheet for vital signs taken during this treatment. After treatment:  
?  Patient left in no apparent distress sitting up in chair ? Patient left in no apparent distress in bed 
? Call bell left within reach ? Nursing notified ? Caregiver present ? Bed alarm activated COMMUNICATION/EDUCATION:  
? Role of Occupational Therapy in the acute care setting 
? Home safety education was provided and the patient/caregiver indicated understanding. ? Patient/family have participated as able in working towards goals and plan of care. ? Patient/family agree to work toward stated goals and plan of care. ? Patient understands intent and goals of therapy, but is neutral about his/her participation. ? Patient is unable to participate in goal setting and plan of care. Thank you for this referral. 
CARLOS ALBERTO Poole Time Calculation: 23 mins

## 2019-06-11 NOTE — PROGRESS NOTES
Discharge/Transition Planning Problem: Discharge Planning Goal: *Discharge to safe environment Outcome: Progressing Towards Goal 
 
Plan has been SNF pending insurance auth. At this time, pt max assist with therapy and mentation not at her baseline. Remains on NGT feeds. Would recommend physician discussion with son of goals of care and if a comfort care/palliative route should be taken Yannick Mercer RN BSN Outcomes Manager Pager # [phone]

## 2019-06-11 NOTE — PROGRESS NOTES
06/11/19 0040 CPAP/BIPAP  
CPAP/BIPAP Start/Stop On  
   
0400: Patient did not tolerate CPT well via vest at 0030. CPT held at 0400 due to patient discomfort. Patient remains stable on BIPAP. RT available if needed.

## 2019-06-11 NOTE — PROGRESS NOTES
1200 Patient refused CPT via vest at this time. MD notified. 1600 Patient sleeping for CPT via Vest. Will check on patient at a later time. MD notified.

## 2019-06-11 NOTE — ROUTINE PROCESS
TRANSFER - OUT REPORT: 
 
Verbal report given to June Stratton RN(name) on Darryl Ventura  being transferred to [facility](unit) for routine progression of care Report consisted of patients Situation, Background, Assessment and  
Recommendations(SBAR). Information from the following report(s) SBAR, Kardex, Procedure Summary, Intake/Output, MAR, Recent Results, Med Rec Status, Cardiac Rhythm A Fib and Alarm Parameters  was reviewed with the receiving nurse. Lines:  
Triple Lumen CVL 06/03/19 Right Internal jugular (Active) Central Line Being Utilized Yes 6/10/2019  4:00 PM  
Criteria for Appropriate Use Limited/no vessel suitable for conventional peripheral access 6/10/2019  4:00 PM  
Site Assessment Clean, dry, & intact 6/10/2019  4:00 PM  
Infiltration Assessment 0 6/10/2019  4:00 PM  
Affected Extremity/Extremities Color distal to insertion site pink (or appropriate for race) 6/10/2019  4:00 AM  
Date of Last Dressing Change 06/03/19 6/10/2019  4:00 AM  
Dressing Status Clean, dry, & intact 6/10/2019  4:00 PM  
Dressing Type Disk with Chlorhexadine gluconate (CHG) 6/10/2019  4:00 PM  
Action Taken Open ports on tubing capped 6/10/2019  4:00 AM  
Proximal Hub Color/Line Status Blue; Infusing 6/10/2019  4:00 AM  
Positive Blood Return (Medial Site) Yes 6/10/2019  4:00 AM  
Medial Hub Color/Line Status Brown; Infusing 6/10/2019  4:00 AM  
Positive Blood Return (Lateral Site) Yes 6/10/2019  4:00 AM  
Distal Hub Color/Line Status White; Infusing 6/10/2019  4:00 AM  
Positive Blood Return (Site #3) Yes 6/10/2019  4:00 AM  
External Catheter Length (cm) 0 centimeters 6/9/2019  4:00 PM  
Alcohol Cap Used Yes 6/10/2019  4:00 AM  
   
Peripheral IV 06/10/19 Left Forearm (Active) Opportunity for questions and clarification was provided. Patient transported with: 02 via nc at 3 LPM, IV pump, via hospital bed, cardiac monitor

## 2019-06-11 NOTE — PROGRESS NOTES
Bedside shift change report given to KVNG Brown (oncoming nurse) by Alan Centeno RN (offgoing nurse). Report included the following information SBAR, Kardex, Intake/Output, MAR and Recent Results. 
   
3635 -- AM medications given, well tolerated, will continue to monitor. 
   
1125 -- Reassessment completed, no change in patient condition, will continue to monitor. 1208 -- Critical PTT >180 given to Stephanie RN by Avani Martinez, no further orders per protocol. 
 
 3524 -- Heparin restarted rate change verified with Milagro Molina RN and primary nurse, next PTT ordered for Q6. . 
  
1516 -- Reassessment completed, no change in patient condition, will continue to monitor. 1555 -- Patient's son Velasquez Sam) came by, patient sleeping will return later.  
   
Bedside shift change report given to Alan Centeno, [facility] nurse) by Micha Joseph RN (offgoing nurse). Report included the following information SBAR, Kardex, Intake/Output, MAR and Recent Results.

## 2019-06-11 NOTE — PROGRESS NOTES
2030 6/10/19 TRANSFER - IN REPORT: 
 
Pt being received from ICU (unit) for routine progression of care Report consisted of patients Situation, Background, Assessment and  
Recommendations(SBAR). Information from the following report(s) SBAR, Procedure Summary, MAR and Recent Results was reviewed with the receiving nurse. Opportunity for questions and clarification was provided. Assessment completed upon patients arrival to unit and care assumed. Required documentation completed 
 
 
0000  Shift reassessment, pt condition unchanged, will continue to monitor. 0300 spoke with pharmacy. pts heparin stopped @2030 and not restarted per protocol. Restarting now. Per pharmacy. Draw new PTT, give 80u/kg bolus and resume @ 6unit previous dose. Repeat PTT in six hours 0400 Shift reassessment, pt condition unchanged, will continue to monitor. 0720 Bedside, Verbal and Written shift change report given to Stephanie RN (oncoming nurse) by Gregg Montez (offgoing nurse). Report included the following information SBAR, Kardex, Intake/Output, MAR and Recent Results. Skin assessment completed.

## 2019-06-11 NOTE — PROGRESS NOTES
Patients son- Caterina Zamudio notified of patients transfer to [address] [place] [address] room 3037. Scar verbalized appreciation.

## 2019-06-11 NOTE — PROGRESS NOTES
RENAL PROGRESS NOTE Franky Pham Assessment/Plan: · EMMETT (secondary to bl le cellulitis/sepsis). Scr is down slightly, good uo in response to lasix drip. · Diabetic kidney disease/proteinuria. · Hypokalemia. Continue replacement. · HFpEF/volume overload. Improved, continue diuresis. Watch scr/bun. · BL le cellulitis/sepsis in a setting of pad/chronic venous insufficiency. On abx. · HTN. Stable, increase amlodipine. · UTI? On abx. · Hypoglycemia with altered ms on admission. Improved. · Anemia. Mgmt per primary team.                                                                                                                               
Subjective:Patient complaints off: Out of the ICU. Alert but slow to respond. Tolerates tf. No SOB/CP/N/V. Patient Active Problem List  
Diagnosis Code  Edema R60.9  Cough R05  Hypercholesterolemia E78.00  Fibromyalgia M79.7  hypothyroid E07.9  Dermatitis L30.9  Essential hypertension I10  Type 2 diabetes mellitus with mild nonproliferative diabetic retinopathy without macular edema ([facility] X31.6626  Type 2 diabetes with nephropathy ([facility] E11.21  
 Severe obesity ([facility]) E66.01  
 Hypoglycemia E16.2  PAD (peripheral artery disease) ([facility]) I73.9  Sepsis ([facility] A41.9  Acute respiratory failure ([facility]) J96.00  
 Cellulitis L03.90  Acute on chronic combined systolic and diastolic heart failure ([facility]) I50.43  Anemia D64.9  Acute encephalopathy G93.40 Current Facility-Administered Medications Medication Dose Route Frequency Provider Last Rate Last Dose  insulin lispro (HUMALOG) injection   SubCUTAneous Q6H Rosana DELANEY, DO   3 Units at 06/11/19 1319  
 dextrose 5% infusion  25 mL/hr IntraVENous CONTINUOUS Munira Ashby MD 25 mL/hr at 06/10/19 2149 25 mL/hr at 06/10/19 2149  
 insulin glargine (LANTUS) injection 5 Units  5 Units SubCUTAneous DAILY Valeriy Delgado MD   5 Units at 06/11/19 8054  potassium chloride (KLOR-CON) packet for solution 40 mEq  40 mEq Oral BID WITH MEALS Caitlyn Chaudhari I NP   40 mEq at 06/11/19 0939  
 metoprolol (LOPRESSOR) injection 5 mg  5 mg IntraVENous Q6H PRN OgboRaffy willsonka I, NP      
 pantoprazole (PROTONIX) 40 mg in sodium chloride 0.9% 10 mL injection  40 mg IntraVENous DAILY Valeriy Delgado MD   40 mg at 06/11/19 4288  furosemide (LASIX) 100 mg in 0.9% sodium chloride 100 mL infusion  15 mg/hr IntraVENous CONTINUOUS Bibiana Unger MD 15 mL/hr at 06/11/19 0419 15 mg/hr at 06/11/19 0419  
 amLODIPine (NORVASC) tablet 5 mg  5 mg Oral DAILY NESHA Acuna   5 mg at 06/11/19 4793  
 0.9% sodium chloride infusion 250 mL  250 mL IntraVENous PRN Yang Aguirre MD      
 polyethylene glycol [facility]) packet 17 g  17 g Oral DAILY Heron Acunama   17 g at 06/11/19 8716  
 nystatin (MYCOSTATIN) 100,000 unit/mL oral suspension 500,000 Units  500,000 Units Oral QID NESHA Acuna   500,000 Units at 06/11/19 1319  hydrALAZINE (APRESOLINE) 20 mg/mL injection 20 mg  20 mg IntraVENous Q4H PRN Yang Aguirre MD   20 mg at 06/07/19 2093  dextrose 10% infusion 125-150 mL  125-150 mL IntraVENous PRN Yang Aguirre  mL/hr at 06/08/19 1907 250 mL at 06/08/19 1907  acetaminophen (TYLENOL) tablet 650 mg  650 mg Oral Q4H PRN NESHA Acuna   650 mg at 06/06/19 1319  
 sodium chloride (NS) flush 5-10 mL  5-10 mL IntraVENous PRN Tj Harris MD   10 mL at 06/04/19 2206  heparin 25,000 units in D5W 250 ml infusion  18-36 Units/kg/hr IntraVENous TITRATE Tj Harris MD 3.4 mL/hr at 06/11/19 1320 3 Units/kg/hr at 06/11/19 1320  levothyroxine (SYNTHROID) tablet 112 mcg  112 mcg Oral ACB Tj Harris MD   112 mcg at 06/11/19 0710  
 glucose chewable tablet 16 g  4 Tab Oral PRN Caitlyn Chaudhari NP   16 g at 06/08/19 1711  glucagon (GLUCAGEN) injection 1 mg  1 mg IntraMUSCular PRN Caitlyn Chaudhari I, NP      
 
 
Objective Vitals:  
 06/11/19 0430 06/11/19 0703 06/11/19 5175 06/11/19 1125 BP:  158/58  164/75 Pulse:  68  79 Resp:  20  20 Temp:  98.8 °F (37.1 °C)  99.2 °F (37.3 °C) SpO2: 94% 92% 97% 97% Weight:      
Height:      
 
 
 
Intake/Output Summary (Last 24 hours) at 6/11/2019 1415 Last data filed at 6/11/2019 1125 Gross per 24 hour Intake 1603.52 ml Output 2125 ml Net -521.48 ml Admission weight: Weight: 113.4 kg (250 lb) (06/03/19 0140) Last Weight Metrics: 
Weight Loss Metrics 6/10/2019 5/9/2019 9/20/2018 3/23/2018 8/4/2017 2/23/2017 10/6/2016 Today's Wt 259 lb 11.2 oz 235 lb 207 lb 3.2 oz 209 lb 3.2 oz 195 lb 198 lb 4.8 oz 198 lb BMI 43.22 kg/m2 39.11 kg/m2 34.48 kg/m2 34.81 kg/m2 32.45 kg/m2 33 kg/m2 32.95 kg/m2 Physical Assessment:  
 
General: NAD, alert and oriented x1. Neck: No jvd. LUNGS: diminished air entry at the bases, no crackles. CVS EXM: S1, S2  RRR. Abdomen: soft, non tender. Lower Extremities:  1-2 bl le  edema. Pretibial skin is wrinkly, less erythematous. Rt shin dressings intact. Lab CBC w/Diff Recent Labs  
  06/11/19 
0300 06/10/19 
0130 06/09/19 
0440 WBC 20.2* 14.5* 14.0*  
RBC 2.43* 2.52* 2.90* HGB 7.0* 7.1* 8.2* HCT 21.0* 21.6* 24.8*  
 166 195 GRANS 81* 78* 75* LYMPH 9* 11* 13* EOS 2 3 2 Chemistry Recent Labs  
  06/11/19 
0300 06/10/19 
1110 06/10/19 
0130  06/09/19 
0440 *  --  137*  --  89  
  --  138  --  138  
K 3.9 3.8 3.8   < > 3.1*  
  --  102  --  103 CO2 25  --  26  --  25  
BUN 50*  --  50*  --  50* CREA 2.07*  --  2.18*  --  2.12* CA 8.3*  --  7.9*  --  8.2* AGAP 8  --  10  --  10  
BUCR 24*  --  23*  --  24* ALB 3.1*  --  3.0*  --  3.6 PHOS 3.6  --  4.0  --  3.6  
 < > = values in this interval not displayed. No results found for: IRON, FE, TIBC, IBCT, PSAT, FERR Lab Results Component Value Date/Time Calcium 8.3 (L) 06/11/2019 03:00 AM  
 Phosphorus 3.6 06/11/2019 03:00 AM  
  
 
Shanna Fraire M.D. [facility] Phone [phone] Pager [phone]

## 2019-06-11 NOTE — PROGRESS NOTES
Speech Therapy Note: Follow up attempted. Could not progress with ST intervention because patient:   
 
[x]  Lethargic, unable to be alerted enough for safe PO intake 
[]  Refused participation []  Off the unit []  NPO for procedure 
[]  Other: SLP will re-attempt treatment as schedule permits. Vipin Calloway M.S., CCC-SLP Speech Language Pathologist

## 2019-06-11 NOTE — PROGRESS NOTES
Problem: Falls - Risk of 
Goal: *Absence of Falls Description Document Ranjeet Mcgee Fall Risk and appropriate interventions in the flowsheet. Outcome: Progressing Towards Goal 
  
Problem: Pressure Injury - Risk of 
Goal: *Prevention of pressure injury Description Document Tate Scale and appropriate interventions in the flowsheet. Outcome: Progressing Towards Goal 
  
Problem: Hypertension Goal: *Blood pressure within specified parameters Outcome: Progressing Towards Goal 
Goal: *Fluid volume balance Outcome: Progressing Towards Goal 
Goal: *Labs within defined limits Outcome: Progressing Towards Goal 
  
Problem: Diabetes Self-Management Goal: *Disease process and treatment process Description Define diabetes and identify own type of diabetes; list 3 options for treating diabetes. Outcome: Progressing Towards Goal 
Goal: *Incorporating nutritional management into lifestyle Description Describe effect of type, amount and timing of food on blood glucose; list 3 methods for planning meals. Outcome: Progressing Towards Goal 
Goal: *Incorporating physical activity into lifestyle Description State effect of exercise on blood glucose levels. Outcome: Progressing Towards Goal 
Goal: *Developing strategies to promote health/change behavior Description Define the ABC's of diabetes; identify appropriate screenings, schedule and personal plan for screenings. Outcome: Progressing Towards Goal 
Goal: *Using medications safely Description State effect of diabetes medications on diabetes; name diabetes medication taking, action and side effects. Outcome: Progressing Towards Goal 
Goal: *Monitoring blood glucose, interpreting and using results Description Identify recommended blood glucose targets  and personal targets. Outcome: Progressing Towards Goal 
Goal: *Prevention, detection, treatment of acute complications Description List symptoms of hyper- and hypoglycemia; describe how to treat low blood sugar and actions for lowering  high blood glucose level. Outcome: Progressing Towards Goal 
Goal: *Prevention, detection and treatment of chronic complications Description Define the natural course of diabetes and describe the relationship of blood glucose levels to long term complications of diabetes. Outcome: Progressing Towards Goal 
Goal: *Developing strategies to address psychosocial issues Description Describe feelings about living with diabetes; identify support needed and support network Outcome: Progressing Towards Goal 
Goal: *Sick day guidelines Outcome: Progressing Towards Goal 
Goal: *Patient Specific Goal (EDIT GOAL, INSERT TEXT) Outcome: Progressing Towards Goal 
  
Problem: Pain Goal: *Control of Pain Outcome: Progressing Towards Goal 
Goal: *PALLIATIVE CARE:  Alleviation of Pain Outcome: Progressing Towards Goal 
  
Problem: Tissue Perfusion - Cardiopulmonary, Altered Goal: *Optimize tissue perfusion Outcome: Progressing Towards Goal 
Goal: *Absence of hypoxia Outcome: Progressing Towards Goal 
  
Problem: Fluid Volume - Risk of, Imbalanced Goal: *Balanced intake and output Outcome: Progressing Towards Goal 
  
Problem: Patient Education: Go to Patient Education Activity Goal: Patient/Family Education Outcome: Progressing Towards Goal 
  
Problem: Discharge Planning Goal: *Discharge to safe environment Outcome: Progressing Towards Goal 
  
Problem: Gas Exchange - Impaired Goal: *Absence of hypoxia Outcome: Progressing Towards Goal 
  
Problem: Diabetes Maintenance:Admission Goal: *Blood glucose 80 to 180 mg/dl Outcome: Progressing Towards Goal 
  
Problem: Nutrition Deficit Goal: *Optimize nutritional status Outcome: Progressing Towards Goal 
  
Problem: General Wound Care Goal: *Non-infected wound: Improvement of existing wound, absence of infection, and maintenance of skin integrity Outcome: Progressing Towards Goal 
 Goal: *Infected Wound: Prevention of further infection and promotion of healing Description Infection control procedures (eg: clean dressings, clean gloves, hand washing, precautions to isolate wound from contamination, sterile instruments used for wound debridement) should be implemented. Outcome: Progressing Towards Goal 
Goal: Interventions Outcome: Progressing Towards Goal 
  
Problem: Patient Education: Go to Patient Education Activity Goal: Patient/Family Education Outcome: Progressing Towards Goal

## 2019-06-12 NOTE — PROGRESS NOTES
NUTRITION follow-up/Plan of care RECOMMENDATIONS:  
 
1. Continue Osmolite 1.2 TF at goal rate of 55 ml/hr to provide 1584 Kcal, 73 g protein and 1082 ml free water. 2. Monitor weight, labs and tolerance of enteral nutrition 3. RD to follow GOALS:  
 
1. Met/Ongoing: Enteral nutrition meets >75% of protein/calorie needs by 6/17 
2. Ongoing: Maintain weight (+/- 1-2 lb) by 
 
ASSESSMENT:  
 
Weight status is classified as obese per BMI of 40.3. Patient is meeting nutrition needs with Osmolite 1.2 at goal of 55 ml/hr providing 1584 Kcal, 73 g protein and 1082 ml free water. Labs noted. Elevated BUN/Creatinine levels; GFR: 27. Patient with hypoalbuminemia. Nutrition recommendations listed. RD to follow. Nutrition Risk:  []   High [x]  Moderate [] Low SUBJECTIVE/OBJECTIVE:  
 
Transferred from ICU. Admitted with acute metabolic encephalopathy. Reviewed SLP note from 6/12 with recommendations for NPO status. Observed Osmolite 1.2 infusing at goal of 55 ml/hr without difficulty. Tolerating well per nursing documentation. Will monitor. Information Obtained From:  
[x] Chart Review [x] Patient 
[] Family/Caregiver [x] Nurse/Physician  
[] Patient Rounds/Interdisciplinary Meeting Diet: NPO; TF: Osmolite 1.2 at 55 ml/hr No data found. Medications: [x] Reviewed (IVF: D5 at 25 ml/hr; Norvasc, Bumex, Protonix, Mycostatin, Miralax) Encounter Diagnoses ICD-10-CM ICD-9-CM 1. Acute metabolic encephalopathy due to hypoglycemia G93.41 348.31  
 E16.2 251.2 2. Hypothermia, initial encounter T68. XXXA 991.6 3. Acute congestive heart failure, unspecified heart failure type (HCC) I50.9 428.0 4. Acute respiratory failure with hypercapnia ([facility]) J96.02 518.81  
5. Arterial occlusion, lower extremity ([facility]) I70.209 444.22 Past Medical History:  
Diagnosis Date  AC joint arthropathy   
 right  Acne rosacea  EMMETT (acute kidney injury) ([facility] [address] 06/03/2019  Bursitis of shoulder rotator cuff  Burst blood vessel in eye, left 02/2018  
 ruptured blood vessel left  Carotid arterial disease ([facility] [address] 06/03/2019  Diabetes ([facility] [address]  Diabetic retinopathy ([facility] [address] severe, non-proliferative  Discoid lupus  Duodenal ulcer  Facet joint disease   
 cervical spine, injections  Fibromyalgia  Hiatal hernia  Hypercholesterolemia  Hypertension  hypothyroid  Impingement syndrome of shoulder   
 left  Lumbar scoliosis  Nonexudative age-related macular degeneration   
 severe  Open-angle glaucoma   
 severe  PAD (peripheral artery disease) ([facility] [address] 06/03/2019  
 severe, bilateral lower extremities  Rectocele  Sepsis associated hypotension ([facility] [address] 06/03/2019 Labs:   
Lab Results Component Value Date/Time Sodium 137 06/12/2019 05:45 AM  
 Potassium 4.3 06/12/2019 05:45 AM  
 Chloride 102 06/12/2019 05:45 AM  
 CO2 29 06/12/2019 05:45 AM  
 Anion gap 6 06/12/2019 05:45 AM  
 Glucose 173 (H) 06/12/2019 05:45 AM  
 BUN 50 (H) 06/12/2019 05:45 AM  
 Creatinine 1.81 (H) 06/12/2019 05:45 AM  
 Calcium 8.5 06/12/2019 05:45 AM  
 Phosphorus 3.5 06/12/2019 05:45 AM  
 Albumin 2.9 (L) 06/12/2019 05:45 AM  
 
Anthropometrics: BMI (calculated): 42.4 Last 3 Recorded Weights in this Encounter 06/08/19 1709 06/10/19 0800 06/11/19 1451 Weight: 117.5 kg (259 lb) 117.8 kg (259 lb 11.2 oz) 115.7 kg (255 lb) Ht Readings from Last 1 Encounters:  
06/08/19 5' 5\" (1.651 m)  
 
[]  Weight Loss 
[]  Weight Gain 
[]  Weight Stable  
[x]  Variable weights Estimated Nutrition Needs:  
9917 XINRN/VGF Protein (g): 75 g Nutrition Problems Identified:  
[x] Suboptimal PO intake  
[] Food Allergies [x] Difficulty chewing/swallowing/poor dentition 
[] Constipation/Diarrhea  
[] Nausea/Vomiting  
[] None 
[x] Other: Enteral nutrition via NGT Plan:  
[] Therapeutic Diet 
[]  Obtained/adjusted food preferences/tolerances and/or snacks options []  Supplements added  
[x]  SLP therapy following for feeding techniques []  HS snack added  
[]  Modify diet texture  
[]  Modify diet for food allergies []  Educate patient  
[]  Assist with menu selection []  Monitor PO intake on meal rounds  
[x]  Continue inpatient monitoring and intervention  
[]  Participated in discharge planning/Interdisciplinary rounds/Team meetings  
[x]  Other: Enteral nutrition recommendations listed Education Needs: 
 [x] Not appropriate for teaching at this time 
 [] Identified and addressed Nutrition Monitoring and Evaluation: 
 [x] Continue inpatient monitoring and interventions [] Other:  
 
Lashell Akbar

## 2019-06-12 NOTE — PROGRESS NOTES
RENAL PROGRESS NOTE Moraima Briggs Assessment/Plan: · EMMETT (secondary to bl le cellulitis/sepsis). Scr is down slightly, good uo in response to lasix drip. · Diabetic kidney disease/proteinuria. · Hypokalemia. Continue replacement. · HFpEF/volume overload. Improved, change lasix drip to po bumex. · BL le cellulitis/sepsis in a setting of pad/chronic venous insufficiency. Finished abx. · HTN. Increase amlodipine. · Hypoglycemia with altered ms on admission. Improved. · Anemia. Mgmt per primary team. Transfused. Subjective:Patient complaints off: Alert but slow to respond. Tolerates tf.  Denies. SOB/CP/N/V. Patient Active Problem List  
Diagnosis Code  Edema R60.9  Cough R05  Hypercholesterolemia E78.00  Fibromyalgia M79.7  hypothyroid E07.9  Dermatitis L30.9  Essential hypertension I10  Type 2 diabetes mellitus with mild nonproliferative diabetic retinopathy without macular edema ([facility] [address] M27.5718  Type 2 diabetes with nephropathy ([facility]) E11.21  
 Severe obesity ([facility]) E66.01  
 Hypoglycemia E16.2  PAD (peripheral artery disease) ([facility]) I73.9  Sepsis ([facility] [address] A41.9  Acute respiratory failure ([facility]) J96.00  
 Cellulitis L03.90  Acute on chronic combined systolic and diastolic heart failure ([facility]) I50.43  Anemia D64.9  Acute encephalopathy G93.40 Current Facility-Administered Medications Medication Dose Route Frequency Provider Last Rate Last Dose  
 0.9% sodium chloride infusion 250 mL  250 mL IntraVENous PRN Chelsi Wagner PA-C      
 [START ON 6/13/2019] insulin glargine (LANTUS) injection 7 Units  7 Units SubCUTAneous DAILY Clive Trevino PA-C      
 insulin lispro (HUMALOG) injection   SubCUTAneous Q6H Jeane Zuniga H, DO   6 Units at 06/12/19 1231  dextrose 5% infusion  25 mL/hr IntraVENous CONTINUOUS Quang Kessler MD 25 mL/hr at 06/12/19 0002 25 mL/hr at 06/12/19 0002  potassium chloride (KLOR-CON) packet for solution 40 mEq  40 mEq Oral BID WITH MEALS Caitlyn Chaudhari NP   40 mEq at 06/12/19 0900  
 metoprolol (LOPRESSOR) injection 5 mg  5 mg IntraVENous Q6H PRN OgboRaffy willsonka I, NP      
 pantoprazole (PROTONIX) 40 mg in sodium chloride 0.9% 10 mL injection  40 mg IntraVENous DAILY Kathy Willis MD   40 mg at 06/12/19 0900  furosemide (LASIX) 100 mg in 0.9% sodium chloride 100 mL infusion  15 mg/hr IntraVENous CONTINUOUS Dara Judge MD 15 mL/hr at 06/12/19 0621 15 mg/hr at 06/12/19 0621  
 amLODIPine (NORVASC) tablet 5 mg  5 mg Oral DAILY Tl Rod PA   5 mg at 06/12/19 0900  
 0.9% sodium chloride infusion 250 mL  250 mL IntraVENous PRN Sabino Arrington MD      
 polyethylene glycol [facility]) packet 17 g  17 g Oral DAILY Pa Taylor, [address]   Stopped at 06/12/19 0900  
 nystatin (MYCOSTATIN) 100,000 unit/mL oral suspension 500,000 Units  500,000 Units Oral QID NESHA Holt   500,000 Units at 06/12/19 1223  hydrALAZINE (APRESOLINE) 20 mg/mL injection 20 mg  20 mg IntraVENous Q4H PRN Sabino Arrington MD   20 mg at 06/12/19 1223  dextrose 10% infusion 125-150 mL  125-150 mL IntraVENous PRN Sabino Arrington  mL/hr at 06/08/19 1907 250 mL at 06/08/19 1907  acetaminophen (TYLENOL) tablet 650 mg  650 mg Oral Q4H PRN NESHA Holt   650 mg at 06/06/19 1319  
 sodium chloride (NS) flush 5-10 mL  5-10 mL IntraVENous PRN Donnie Alba MD   10 mL at 06/04/19 2206  heparin 25,000 units in D5W 250 ml infusion  18-36 Units/kg/hr IntraVENous TITRATE Donnie Alba MD 4.5 mL/hr at 06/12/19 0919 4 Units/kg/hr at 06/12/19 0329  levothyroxine (SYNTHROID) tablet 112 mcg  112 mcg Oral ACB Donnie Alba MD   112 mcg at 06/12/19 9479  glucose chewable tablet 16 g  4 Tab Oral PRN Caitlyn Chaudhari NP   16 g at 06/08/19 1711  
 glucagon (GLUCAGEN) injection 1 mg  1 mg IntraMUSCular PRN Caitlyn Chaudhari NP      
 
 
Objective Vitals:  
 06/12/19 0418 06/12/19 0709 06/12/19 0846 06/12/19 1136 BP: 171/69 167/54  180/46 Pulse: 75 96  100 Resp: 20 20  20 Temp: 98.2 °F (36.8 °C) 98.9 °F (37.2 °C)  98.9 °F (37.2 °C) SpO2:  100% 96% 94% Weight:      
Height:      
 
 
 
Intake/Output Summary (Last 24 hours) at 6/12/2019 1340 Last data filed at 6/12/2019 1253 Gross per 24 hour Intake 2094.73 ml Output 6150 ml Net -4055.27 ml Admission weight: Weight: 113.4 kg (250 lb) (06/03/19 0140) Last Weight Metrics: 
Weight Loss Metrics 6/11/2019 5/9/2019 9/20/2018 3/23/2018 8/4/2017 2/23/2017 10/6/2016 Today's Wt 255 lb 235 lb 207 lb 3.2 oz 209 lb 3.2 oz 195 lb 198 lb 4.8 oz 198 lb BMI 42.43 kg/m2 39.11 kg/m2 34.48 kg/m2 34.81 kg/m2 32.45 kg/m2 33 kg/m2 32.95 kg/m2 Physical Assessment:  
 
General: NAD, alert and oriented x1. Neck: No jvd. LUNGS: diminished air entry at the bases, no crackles. CVS EXM: S1, S2  RRR. Abdomen: soft, non tender. Lower Extremities:  1-2 bl le  edema. Pretibial skin is wrinkly, less erythematous. Rt shin dressings intact. Lab CBC w/Diff Recent Labs  
  06/12/19 
0545 06/11/19 
0300 06/10/19 
0130 WBC 20.8* 20.2* 14.5*  
RBC 2.20* 2.43* 2.52* HGB 6.2* 7.0* 7.1*  
HCT 18.9* 21.0* 21.6*  
 165 166 GRANS 82* 81* 78* LYMPH 8* 9* 11* EOS 2 2 3 Chemistry Recent Labs  
  06/12/19 
0545 06/11/19 
0300 06/10/19 
1110 06/10/19 
0130 * 122*  --  137*  136  --  138  
K 4.3 3.9 3.8 3.8  103  --  102 CO2 29 25  --  26 BUN 50* 50*  --  50* CREA 1.81* 2.07*  --  2.18* CA 8.5 8.3*  --  7.9* AGAP 6 8  --  10  
BUCR 28* 24*  --  23* ALB 2.9* 3.1*  --  3.0*  
PHOS 3.5 3.6  --  4.0 No results found for: IRON, FE, TIBC, IBCT, PSAT, FERR Lab Results Component Value Date/Time Calcium 8.5 06/12/2019 05:45 AM  
 Phosphorus 3.5 06/12/2019 05:45 AM  
  
 
Tenny Goodpasture, M.D. [facility] Phone [phone] Pager [phone]

## 2019-06-12 NOTE — PROGRESS NOTES
Pulmonary progress note 
  
[de-identified]year-old woman admitted to the hospital with mental status changes secondary to hypoglycemia and hypercapnia. Hypoglycemia initially appeared to be related to an inadvertent overdose of glipizide. Hypoglycemia, persists. CO2 retention is improving. Cessation of Ultram also appeared to help her mental status. 
  
The patient is more alert. She continues to have occasional problems speaking clearly. She also seems to be processing information slowly. Leg pain has improved to admission 
  
No respiratory distress at rest 
Afebrile. Mid 90s on 2 L nasal cannula. Moves right side but weakly Wiggles left toes but minimal tone of the left arm with passive movement. Overall no improvement in strength of left hand Diffuse pitting edema persists, but it appears to be slightly improved to a few days ago. No wheezing. Irregular rhythm but rate controlled. Abdomen soft. Bilateral lower extremity erythema appears improved. 
  
Labs WBC 20.8. No bands. Hemoglobin 6.2 PTT 66. On heparin drip for lower extremity peripheral arterial disease Creatinine 1.81 and slowly improving. Net out 4.1 L over the last 2 days. 
  
Assessment Altered mental status currently likely reflecting Ultram, hypoglycemia and hypercapnia, improving Persistent hypoglycemia of unclear etiology Acute renal insufficiency resolving Volume overload improving Leukocytosis without clear sign of infection Anemia of unclear ED Atrial fibrillation and peripheral arterial disease on anticoagulation. The patient was on a heparin drip because of the possibility of dialysis catheter placement. Consider transition to oral therapy. 
  
Plan Minimize all sedating medications Minimize any nephrotoxic meds Transition to oral anticoagulant Increase activity as tolerated Continue diuresis

## 2019-06-12 NOTE — PROGRESS NOTES
Patient is unable to communicate at this time as she is resting peacefully. Patient appears to be in poor condition. No family seen at this time.  offered prayer and left Spiritual Care brochure. Chaplains will continue to follow and will provide pastoral care on an as needed/requested basis. Chin[place] 3 Board Certified [employer] Spiritual Care  
[phone]

## 2019-06-12 NOTE — PROGRESS NOTES
Problem: Dysphagia (Adult) Goal: *Acute Goals and Plan of Care (Insert Text) Description Recommendations: 
Diet: NPO Meds: via IV Aspiration Precautions Oral Care TID Goals:  Patient will: 1. Tolerate PO trials with 0 s/s overt distress in 4/5 trials 2. Utilize compensatory swallow strategies/maneuvers (decrease bite/sip, size/rate, alt. liq/sol) with min cues in 4/5 trials 3. Perform oral-motor/laryngeal exercises to increase oropharyngeal swallow function with min cues 4. Complete an objective swallow study (i.e., MBSS) to assess swallow integrity, r/o aspiration, and determine of safest LRD, min A  - completed 6/7/19 Outcome: Not Progressing Towards Goal 
 
SPEECH LANGUAGE PATHOLOGY DYSPHAGIA TREATMENT Patient: Darryl Ventura (11 y.o. female) Date: 6/12/2019 Diagnosis: Hypoglycemia [E16.2] PAD (peripheral artery disease) ([facility] [address] [I73.9] Diabetes ([facility] [address] [E11.9] Sepsis ([facility] [address] Precautions: aspiration, Fall PLOF:as per H&P  
 
ASSESSMENT: 
Pt seen b/s for dysphagia tx. Pt awake, alert, with wet vocal quality when SLP arrived. Pt able to clear with verbal cues for throat clear and repeat swallow. Pt stated she is able to tell when her vocal quality isn't clear, educated pt to clear throat and re-swallow whenever that is the case. Pt continued to require mod verbal cues to clear vocal quality throughout session. Pt accepted single presentation of nectar via tsp. Pt with poor oral manipulation with tongue thrusting, anterior spillage of ~25% of presentation, slow oral transit, suspect some degree of premature spillage, mild- moderate lingual residue, moderately delayed swallow response with decreased laryngeal elevation. Pt with clear vocal quality immediately following swallow response, mildly delayed throat clear response, ?from pharyngeal residue vs penetration/ aspiration? No additional po trials given.   Pt with dried mucus coating tongue, lips, and teeth.  Oral care completed with toothette. Rec: continue NPO, aspiration precautions. SLP will continue to follow for possible initiation of po feeds/ swallow stim. Progression toward goals: 
?         Improving appropriately and progressing toward goals ? Improving slowly and progressing toward goals ? Not making progress toward goals and plan of care will be adjusted PLAN: 
Recommendations and Planned Interventions: 
continue NPO, aspiration precautions. Patient continues to benefit from skilled intervention to address the above impairments. Continue treatment per established plan of care. Discharge Recommendations:  Jack Ventura and To Be Determined SUBJECTIVE:  
Patient stated ? My heels hurt? . 
 
OBJECTIVE:  
Cognitive and Communication Status: 
Neurologic State: Alert Orientation Level: Oriented to person, Oriented to situation, Oriented to place, Disoriented to time Cognition: Follows commands Perception: Appears intact Perseveration: No perseveration noted Safety/Judgement: Fall prevention Dysphagia Treatment: 
Oral Assessment: 
Oral Assessment Labial: Decreased rate, Decreased seal 
Dentition: Natural, Intact Oral Hygiene: poor Lingual: Decreased rate, Decreased strength, Incoordinated Velum: Unable to visualize Mandible: No impairment Gag Reflex: Reduced P.O. Trials: 
Patient Position: HOB 45* Vocal quality prior to P.O.: Low volume, Wet Consistency Presented: Nectar thick liquid How Presented: SLP-fed/presented, Spoon How Much: 1 Bolus Acceptance: Impaired Bolus Formation/Control: Impaired Type of Impairment: Delayed, Drooling, Incomplete, Poor, Premature spillage, Lip closure, Anterior Propulsion: Delayed (# of seconds), Discoordination, Tongue pumping, Other (comment)(tongue thrusting) Oral Residue: 10-50% of bolus, Anterior, Lingual 
 Initiation of Swallow: Delayed (# of seconds)(~20 seconds) Laryngeal Elevation: Decreased Aspiration Signs/Symptoms: Delayed cough/throat clear Pharyngeal Phase Characteristics: Suspected pharyngeal residue, Poor endurance, Multiple swallows, Easily fatigued , Audible swallow Effective Modifications: None Cues for Modifications: Minimal-moderate Oral Phase Severity: Moderate-severe Pharyngeal Phase Severity : Moderate-severe PAIN: 
Pain level pre-treatment: 8/10 Pain level post-treatment: 8/10 Pain Intervention(s): Repositioning- elevated foot of bed Response to intervention: Nurse notified, admin pain meds earlier this date After treatment:  
?              Patient left in no apparent distress sitting up in chair ? Patient left in no apparent distress in bed 
? Call bell left within reach ? Nursing notified ? Family present ? Caregiver present ? Bed alarm activated COMMUNICATION/EDUCATION:  
? Aspiration precautions; swallow safety; compensatory techniques ? Patient unable to participate in education; education ongoing with staff ? Posted safety precautions in patient's room. ? Oral-motor/laryngeal strengthening exercises Cecilio Ruano MS, CCC/SLP Time Calculation: 28 mins

## 2019-06-12 NOTE — PROGRESS NOTES
Bedside shift change report given to KVNG Brown (oncoming nurse) by  RN (offgoing nurse). Report included the following information SBAR, Kardex, Intake/Output, MAR and Recent Results. A/O x2, no pain noted, SR BBB per tele-monitor, IV flushed and infusing, call bell and personal belongings in reach. 9123 -- Criticial lab PTT >180, Heparin stopped. 
   
0900 -- AM medications given, well tolerated, will continue to monitor. 0920 -- Heparin restarted and decreased by 3. 
 
0952 -- Blood draw. 
   
1136 -- Reassessment completed, no change in patient condition, will continue to monitor. 1520 -- Nurse in room to draw blood, patient's son Mark Maya) at the bedside for update on the patient, patient's son Mark Maya) would like to speak with the doctor, will try to be at the hospital tomorrow morning for update with the doctor. 
   
9027 -- Reassessment completed, no change in patient condition, will continue to monitor. 1819 -- Vitals taken. 1822 -- Blood transfusion started.   
 
1837 -- Vitals taken. 1842 -- Heparin new bag and rate changed, with a bolus given. 1852 -- Vital signs taken. Bedside shift change report given to [facility], RN (oncoming nurse) by Nathanael Calles RN (offgoing nurse). Report included the following information SBAR, Kardex, Intake/Output, MAR and Recent Results. 54 y/o female hx anxiety, breast cancer on hormones in remission c/o >6 hours of intermittent chest pain that she feels is likely anxiety and several weeks of green malodorous discharge with occasional pink tinge. States discharge started after "trying" sexual stuff with a male, no penile involvement, only hand and he "had long nails" so she made him stop, thinks that may have caused it. Some mild chronic abd pain since surgery many years ago without acute change. States no penile intercourse in >2 years. +vaginal itching, no dysuria. Pt requesting to be out of hospital in 2 hours, and requesting tv on.     ROS: No fever/chills. No eye pain/changes in vision, No ear pain/sore throat/dysphagia, No palpitations. No SOB/cough/. No N/V/D, no black/bloody bm. No headache. No Dizziness.    No rashes or breaks in skin. No numbness/tingling/weakness.

## 2019-06-12 NOTE — PROGRESS NOTES
Internal Medicine Progress Note Patient's Name: Rayshawn Rogers Admit Date: 6/3/2019 Length of Stay: 9 Assessment/Plan Principal Problem: 
  Sepsis ([facility] [address] (6/3/2019) Active Problems: 
  PAD (peripheral artery disease) ([address] (6/3/2019) Acute encephalopathy (6/9/2019) Acute on chronic combined systolic and diastolic heart failure ([address] (6/4/2019) 
 
  hypothyroid () Essential hypertension (9/3/2015) Type 2 diabetes with nephropathy ([address] (3/23/2018) Severe obesity ([address] (5/9/2019) Hypoglycemia (6/3/2019) Acute respiratory failure ([address] (6/3/2019) Cellulitis (6/3/2019) Anemia (6/8/2019) Sepsis - possible 2/2 BLE cellulitis, PNA? 
- PCCM consulted - appreciate - IV abx: vanc/zosyn 
- leukocytosis - wound care for BLE 
- 1/2 BCx 6/3 positive for coag neg staph 
- repeat BCx NGTD 
  
Acute encephalopathy  
-most likely multifactorial due to metabolic conditions, maybe hemorrhagic  
-CT head 6/8: ddx includes microvascular ischemic change, demylination, and white matter infacrt 
-improving 
  
Acute Resp Failure 
- bipap, NC as tolerated - tx CHF 
  
Diabetes - Hypoglycemic this morning 
-mentation improving with glucose ggt 
- monitor BG 
- lantus/SSI 
  
Acute on chronic CHF 
- BNP 14642, left pleural effusion 
- monitor BMP 
- echo done, results below 
- strict I&Os -nephrology consulted - appreciate services 
-potential dialysis vs ultrafiltration 
  
PAD with arterial occlusion - VS consulted - appreciate -  BLE duplex - b/l PAD, mod stenosis in R CF artery, absent R PT artery consistent with occlusion, absent L distal pop/PT/AT arteries consistent with occlusion. 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 
- Hep gtt 
- no emergent vascular intervention - pt unstable 
- will eventually need aortogram with BLE r/o with possible intervention per VS 
  
EMMETT 
- monitor BMP 
- creatinine rising 
-nephrology consulted - appreciate services HTN 
 - monitor  
  
Anemia 
-blood transfusion 
 
- Cont acceptable home medications for chronic conditions  
- DVT protocol I have personally reviewed all pertinent labs and films that have officially resulted over the last 24 hours. I have personally checked for all pending labs that are awaiting final results. Interval History Per H&P, \"Nette Lama is a [de-identified] y.o. female who was found down by her son. Ricardo Samples had weakness and altered mental status.  She also had SOB.  She has a known history of Diabetes.  In the ER she was also found to have hypoglycemia. Ricardo Samples is noted to have decreased pulses bilaterally.  She also was found to have significant respiratory distress and was started on BiPap.  Vascular surgery was called because of decreased perfusion to both feet.  She has severe erythema involving both feet as well as her abdomen and below her breast. Sudhakar Linton is significant concern for sepsis along with her acute respiratory failure.  She is admitted for ongoing management. \" 
  
Hypoglycemia initially managed with D10 bolus and D20 infusion. Vascular surgery recommended heparin gtt and imaging. BLE duplex - b/l PAD, mod stenosis in R CF artery, absent R PT artery consistent with occlusion, absent L distal pop/PT/AT arteries consistent with occlusion. 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 No emergent surgical intervention per VS until patient stabilizes. Wound Care consulted. Patient also found to be in heart failure, bumex started. BCx 6/3 positive for coag neg staph in 1/2 bottles. Repeat BCx done 6/6. Creatinine noted to be rising gradually. PT/OT recommending SNF. ST recs puree/nectar thick liquids. Transferred out of ICU 6/10 Subjective Pt s/e @ bedside. No major events overnight. Remains the same, mentation appears the same as yesterday. Fatigued, denies abd pain, denies worsening weakness Objective Visit Vitals /46 Pulse 100 Temp 98.9 °F (37.2 °C) Resp 20 Ht 5' 5\" (1.651 m) Wt 115.7 kg (255 lb) SpO2 94% BMI 42.43 kg/m² Physical Exam: 
General Appearance: NAD, minimally conversant, anasarca HENT: normocephalic/atraumatic, moist mucus membranes Neck: No JVD, supple Lungs: CTA with normal respiratory effort CV: RRR, no m/r/g Abdomen: soft, non-tender, normal bowel sounds Extremities: no cyanosis, peripheral edema Neuro: Was able to follow commands,  Minimal  strength left hand, minimal movement LUE/LLE. Right sided nml, follows commands, Intake and Output: 
Current Shift:  06/12 0701 - 06/12 1900 In: 365 Out: 1000 [Urine:1000] Last three shifts:  06/10 1901 - 06/12 0700 In: 3289.3 [I.V.:1594.3] Out: [address] [[id number]] Lab/Data Reviewed: All lab results for the last 24 hours reviewed. Imaging Reviewed: 
No results found. Medications Reviewed: 
Current Facility-Administered Medications Medication Dose Route Frequency  0.9% sodium chloride infusion 250 mL  250 mL IntraVENous PRN  
 [START ON 6/13/2019] insulin glargine (LANTUS) injection 7 Units  7 Units SubCUTAneous DAILY  [START ON 6/13/2019] amLODIPine (NORVASC) tablet 10 mg  10 mg Oral DAILY  bumetanide (BUMEX) tablet 1 mg  1 mg Oral BID  insulin lispro (HUMALOG) injection   SubCUTAneous Q6H  
 dextrose 5% infusion  25 mL/hr IntraVENous CONTINUOUS  
 potassium chloride (KLOR-CON) packet for solution 40 mEq  40 mEq Oral BID WITH MEALS  metoprolol (LOPRESSOR) injection 5 mg  5 mg IntraVENous Q6H PRN  pantoprazole (PROTONIX) 40 mg in sodium chloride 0.9% 10 mL injection  40 mg IntraVENous DAILY  0.9% sodium chloride infusion 250 mL  250 mL IntraVENous PRN  polyethylene glycol (MIRALAX) packet 17 g  17 g Oral DAILY  nystatin (MYCOSTATIN) 100,000 unit/mL oral suspension 500,000 Units  500,000 Units Oral QID  hydrALAZINE (APRESOLINE) 20 mg/mL injection 20 mg  20 mg IntraVENous Q4H PRN  
 dextrose 10% infusion 125-150 mL  125-150 mL IntraVENous PRN  
  acetaminophen (TYLENOL) tablet 650 mg  650 mg Oral Q4H PRN  
 sodium chloride (NS) flush 5-10 mL  5-10 mL IntraVENous PRN  
 heparin 25,000 units in D5W 250 ml infusion  18-36 Units/kg/hr IntraVENous TITRATE  levothyroxine (SYNTHROID) tablet 112 mcg  112 mcg Oral ACB  glucose chewable tablet 16 g  4 Tab Oral PRN  
 glucagon (GLUCAGEN) injection 1 mg  1 mg IntraMUSCular PRN Aguilar Chamberlain PA-C [address] [place], [place] [address] Pager: [phone] Office: [phone]

## 2019-06-12 NOTE — PROGRESS NOTES
1905 Bedside, Verbal and Written shift change report given to [address] [place] [address] (oncoming nurse) by Kenroy CALDERON (offgoing nurse). Report included the following information SBAR, Kardex, Intake/Output, MAR and Recent Results. Feeding tube and gtt rates verified. 2200 Lozoya care performed. 0 residual from feeding tube. Heparin rate changed and bolus given d/t PTT value. 0000 Shift reassessment, pt condition unchanged, will continue to monitor. 0400  Shift reassessment, pt condition unchanged, will continue to monitor. 0600 bed bath, gown change, and lozoya care complete. CVL dressing changed, CVL hubs replaced, gtt lines replaced. NG tube flushed, 0 residual.  
 
0700 Bedside, Verbal and Written shift change report given to  Stephanie RN (oncoming nurse) by Carlin Delacruz RN (offgoing nurse). Report included the following information SBAR, Kardex, Intake/Output, MAR and Recent Results. Skin assessment completed.

## 2019-06-12 NOTE — DIABETES MGMT
Glycemic Control: 
Recommend increasing Lantus from 5 to 7 units q 24 hrs for 3 BG>200.  Geraldo RAE

## 2019-06-12 NOTE — PROGRESS NOTES
Problem: Falls - Risk of 
Goal: *Absence of Falls Description Document Shawn Washington Fall Risk and appropriate interventions in the flowsheet. Outcome: Progressing Towards Goal 
  
Problem: Pressure Injury - Risk of 
Goal: *Prevention of pressure injury Description Document Tate Scale and appropriate interventions in the flowsheet. Outcome: Progressing Towards Goal 
  
Problem: Hypertension Goal: *Blood pressure within specified parameters Outcome: Progressing Towards Goal 
Goal: *Fluid volume balance Outcome: Progressing Towards Goal 
Goal: *Labs within defined limits Outcome: Progressing Towards Goal 
  
Problem: Diabetes Self-Management Goal: *Disease process and treatment process Description Define diabetes and identify own type of diabetes; list 3 options for treating diabetes. Outcome: Progressing Towards Goal 
Goal: *Incorporating nutritional management into lifestyle Description Describe effect of type, amount and timing of food on blood glucose; list 3 methods for planning meals. Outcome: Progressing Towards Goal 
Goal: *Incorporating physical activity into lifestyle Description State effect of exercise on blood glucose levels. Outcome: Progressing Towards Goal 
Goal: *Developing strategies to promote health/change behavior Description Define the ABC's of diabetes; identify appropriate screenings, schedule and personal plan for screenings. Outcome: Progressing Towards Goal 
Goal: *Using medications safely Description State effect of diabetes medications on diabetes; name diabetes medication taking, action and side effects. Outcome: Progressing Towards Goal 
Goal: *Monitoring blood glucose, interpreting and using results Description Identify recommended blood glucose targets  and personal targets. Outcome: Progressing Towards Goal 
Goal: *Prevention, detection, treatment of acute complications Description List symptoms of hyper- and hypoglycemia; describe how to treat low blood sugar and actions for lowering  high blood glucose level. Outcome: Progressing Towards Goal 
Goal: *Prevention, detection and treatment of chronic complications Description Define the natural course of diabetes and describe the relationship of blood glucose levels to long term complications of diabetes. Outcome: Progressing Towards Goal 
Goal: *Developing strategies to address psychosocial issues Description Describe feelings about living with diabetes; identify support needed and support network Outcome: Progressing Towards Goal 
Goal: *Sick day guidelines Outcome: Progressing Towards Goal 
Goal: *Patient Specific Goal (EDIT GOAL, INSERT TEXT) Outcome: Progressing Towards Goal 
  
Problem: Pain Goal: *Control of Pain Outcome: Progressing Towards Goal 
Goal: *PALLIATIVE CARE:  Alleviation of Pain Outcome: Progressing Towards Goal 
  
Problem: Tissue Perfusion - Cardiopulmonary, Altered Goal: *Optimize tissue perfusion Outcome: Progressing Towards Goal 
Goal: *Absence of hypoxia Outcome: Progressing Towards Goal 
  
Problem: Fluid Volume - Risk of, Imbalanced Goal: *Balanced intake and output Outcome: Progressing Towards Goal 
  
Problem: Patient Education: Go to Patient Education Activity Goal: Patient/Family Education Outcome: Progressing Towards Goal 
  
Problem: Discharge Planning Goal: *Discharge to safe environment Outcome: Progressing Towards Goal 
  
Problem: Gas Exchange - Impaired Goal: *Absence of hypoxia Outcome: Progressing Towards Goal 
  
Problem: Nutrition Deficit Goal: *Optimize nutritional status Outcome: Progressing Towards Goal 
  
Problem: General Wound Care Goal: *Non-infected wound: Improvement of existing wound, absence of infection, and maintenance of skin integrity Outcome: Progressing Towards Goal 
Goal: *Infected Wound: Prevention of further infection and promotion of healing Description Infection control procedures (eg: clean dressings, clean gloves, hand washing, precautions to isolate wound from contamination, sterile instruments used for wound debridement) should be implemented. Outcome: Progressing Towards Goal 
Goal: Interventions Outcome: Progressing Towards Goal 
  
Problem: Patient Education: Go to Patient Education Activity Goal: Patient/Family Education Outcome: Progressing Towards Goal 
  
Problem: Discharge Planning Goal: *Discharge to safe environment Outcome: Progressing Towards Goal

## 2019-06-13 PROBLEM — E66.9 OBESITY: Status: ACTIVE | Noted: 2019-01-01

## 2019-06-13 PROBLEM — E87.3 METABOLIC ALKALOSIS: Status: ACTIVE | Noted: 2019-01-01

## 2019-06-13 NOTE — PROGRESS NOTES
RENAL PROGRESS NOTE Sabrina Saucedalander Assessment/Plan: · EMMETT: nonoliguric. Falling creat. Good urine output on Bumex oral.  
· Diabetic kidney disease/proteinuria. · Hypokalemia. Replace K/Mg as needed. · HFpEF/volume overload: much improved after Lasix drip. Oral Bumex. · BL le cellulitis/sepsis in a setting of pad/chronic venous insufficiency. Finished abx. · HTN. Increase amlodipine. · Hypoglycemia with altered ms on admission. Improved. · Anemia. Mgmt per primary team. Transfused. . 
                                                                                                                              
Subjective:Patient complaints of: No complaints. No SOB/CP/N/V. Patient Active Problem List  
Diagnosis Code  Edema R60.9  Cough R05  Hypercholesterolemia E78.00  Fibromyalgia M79.7  hypothyroid E07.9  Dermatitis L30.9  Essential hypertension I10  Type 2 diabetes mellitus with mild nonproliferative diabetic retinopathy without macular edema ([facility] [address] H48.7565  Type 2 diabetes with nephropathy ([facility]) E11.21  
 Severe obesity ([facility]) E66.01  
 Hypoglycemia E16.2  PAD (peripheral artery disease) ([facility]) I73.9  Sepsis ([facility] [address] A41.9  Acute respiratory failure ([facility]) J96.00  
 Cellulitis L03.90  Acute on chronic combined systolic and diastolic heart failure ([facility]) I50.43  Anemia D64.9  Acute encephalopathy G93.40  Metabolic alkalosis B81.4 Current Facility-Administered Medications Medication Dose Route Frequency Provider Last Rate Last Dose  metoprolol tartrate (LOPRESSOR) tablet 25 mg  25 mg Oral BID Aj BARNHART PA-C      
 0.9% sodium chloride infusion 250 mL  250 mL IntraVENous PRN Aj BARNHART PA-C      
 insulin glargine (LANTUS) injection 7 Units  7 Units SubCUTAneous DAILY Felice Zavala PA-C   7 Units at 06/13/19 6177  amLODIPine (NORVASC) tablet 10 mg  10 mg Oral DAILY Kellie Mina MD   10 mg at 06/13/19 6583  bumetanide (BUMEX) tablet 1 mg  1 mg Oral BID Kellie Mina MD   1 mg at 06/13/19 0911  
 insulin lispro (HUMALOG) injection   SubCUTAneous Q6H Leena DELANEY DO   Stopped at 06/13/19 0600  
 dextrose 5% infusion  25 mL/hr IntraVENous CONTINUOUS Karla Bui MD 25 mL/hr at 06/12/19 0002 25 mL/hr at 06/12/19 0002  potassium chloride (KLOR-CON) packet for solution 40 mEq  40 mEq Oral BID WITH MEALS Caitlyn Chaudhari NP   40 mEq at 06/13/19 0727  
 metoprolol (LOPRESSOR) injection 5 mg  5 mg IntraVENous Q6H PRN Caitlyn Chaudhari NP      
 pantoprazole (PROTONIX) 40 mg in sodium chloride 0.9% 10 mL injection  40 mg IntraVENous DAILY Felicia Willis MD   40 mg at 06/13/19 0910  
 0.9% sodium chloride infusion 250 mL  250 mL IntraVENous PRN Chandana Mike MD      
 polyethylene glycol [facility]) packet 17 g  17 g Oral DAILY Frances Duron [place]   Stopped at 06/12/19 0900  
 nystatin (MYCOSTATIN) 100,000 unit/mL oral suspension 500,000 Units  500,000 Units Oral QID NESHA Jaime   500,000 Units at 06/13/19 4354  hydrALAZINE (APRESOLINE) 20 mg/mL injection 20 mg  20 mg IntraVENous Q4H PRN Chandana Mike MD   20 mg at 06/12/19 1223  dextrose 10% infusion 125-150 mL  125-150 mL IntraVENous PRN Chandana Mike  mL/hr at 06/08/19 1907 250 mL at 06/08/19 1907  acetaminophen (TYLENOL) tablet 650 mg  650 mg Oral Q4H PRN NESHA Jaime   650 mg at 06/06/19 1319  
 sodium chloride (NS) flush 5-10 mL  5-10 mL IntraVENous PRN Jacquie Khan MD   10 mL at 06/04/19 2206  heparin 25,000 units in D5W 250 ml infusion  18-36 Units/kg/hr IntraVENous TITRATE Jacquie Khan MD 6.8 mL/hr at 06/13/19 0701 6 Units/kg/hr at 06/13/19 0701  levothyroxine (SYNTHROID) tablet 112 mcg  112 mcg Oral ACB Jacquie Khan MD   112 mcg at 06/13/19 1664  glucose chewable tablet 16 g  4 Tab Oral PRN Caitlyn Chaudhari NP   16 g at 06/08/19 1711  
 glucagon (GLUCAGEN) injection 1 mg  1 mg IntraMUSCular PRN Caitlyn Chaudhari NP      
 
 
Objective Vitals:  
 06/13/19 0885 06/13/19 0032 06/13/19 9320 06/13/19 8965 BP:   169/60 176/63 Pulse:   93 89 Resp:   22 20 Temp:   99.8 °F (37.7 °C) 99.4 °F (37.4 °C) SpO2: 97% 97% 99% 100% Weight:      
Height:      
 
 
 
Intake/Output Summary (Last 24 hours) at 6/13/2019 4706 Last data filed at 6/13/2019 3647 Gross per 24 hour Intake 950 ml Output 5050 ml Net -4100 ml Admission weight: Weight: 113.4 kg (250 lb) (06/03/19 0140) Last Weight Metrics: 
Weight Loss Metrics 6/12/2019 5/9/2019 9/20/2018 3/23/2018 8/4/2017 2/23/2017 10/6/2016 Today's Wt 242 lb 235 lb 207 lb 3.2 oz 209 lb 3.2 oz 195 lb 198 lb 4.8 oz 198 lb BMI 40.27 kg/m2 39.11 kg/m2 34.48 kg/m2 34.81 kg/m2 32.45 kg/m2 33 kg/m2 32.95 kg/m2 Physical Assessment:  
 
General: NAD, alert and oriented. Neck: No jvd. LUNGS: Clear to Auscultation, No rales, rhonchi or wheezes. CVS EXM: S1, S2  RRR, no murmurs/gallops/rubs. Abdomen: soft, non tender. Lower Extremities:  no edema. Lab CBC w/Diff Recent Labs  
  06/13/19 
0403 06/12/19 
0545 06/11/19 
0300 WBC 19.6* 20.8* 20.2*  
RBC 2.56* 2.20* 2.43* HGB 7.4* 6.2* 7.0*  
HCT 22.9* 18.9* 21.0*  
 205 165 GRANS 82* 82* 81* LYMPH 7* 8* 9*  
EOS 2 2 2 Chemistry Recent Labs  
  06/13/19 
0403 06/12/19 
0545 06/11/19 
0300 * 173* 122*  137 136  
K 4.2 4.3 3.9 CL 98* 102 103 CO2 33* 29 25 BUN 48* 50* 50* CREA 1.46* 1.81* 2.07* CA 8.4* 8.5 8.3* AGAP 6 6 8 BUCR 33* 28* 24* ALB 2.8* 2.9* 3.1*  
PHOS 3.1 3.5 3.6 No results found for: IRON, FE, TIBC, IBCT, PSAT, FERR Lab Results Component Value Date/Time  Calcium 8.4 (L) 06/13/2019 04:03 AM  
 Phosphorus 3.1 06/13/2019 04:03 AM  
  
 
Lulu Caldwell MD 
 [facility] Pager: [phone]

## 2019-06-13 NOTE — PROGRESS NOTES
Internal Medicine Progress Note Patient's Name: Doreen Ocasio Admit Date: 6/3/2019 Length of Stay: 10 
 
 
Assessment/Plan Principal Problem: 
  Sepsis ([address] (6/3/2019) Active Problems: 
  PAD (peripheral artery disease) ([address] (6/3/2019) Acute encephalopathy (6/9/2019) Acute on chronic combined systolic and diastolic heart failure ([address] (6/4/2019) 
 
  hypothyroid () Essential hypertension (9/3/2015) Type 2 diabetes with nephropathy ([address] (3/23/2018) Obesity (5/9/2019) Hypoglycemia (6/3/2019) Acute respiratory failure ([address] (6/3/2019) Cellulitis (6/3/2019) Anemia (6/8/2019) Metabolic alkalosis (5/36/4948) Need to discuss with family PEG, Hospice, goals of care Transition to oral anticoagulation after decision about PEG is made Sepsis - possible 2/2 BLE cellulitis, PNA? 
- PCCM consulted - appreciate - IV abx: vanc/zosyn 
- leukocytosis - wound care for BLE 
- 1/2 BCx 6/3 positive for coag neg staph 
- repeat BCx NGTD 
  
Acute encephalopathy  
-most likely multifactorial due to metabolic conditions, maybe hemorrhagic  
-CT head 6/8: ddx includes microvascular ischemic change, demylination, and white matter infacrt 
-improving 
  
Acute Resp Failure 
- bipap, NC as tolerated - tx CHF 
  
Diabetes - Hypoglycemic this morning 
-mentation improving with glucose ggt 
- monitor BG 
- lantus/SSI 
  
Acute on chronic CHF 
- BNP 23370, left pleural effusion 
- monitor BMP 
- echo done, results below 
- strict I&Os -nephrology consulted - appreciate services 
-potential dialysis vs ultrafiltration 
  
PAD with arterial occlusion - VS consulted - appreciate -  BLE duplex - b/l PAD, mod stenosis in R CF artery, absent R PT artery consistent with occlusion, absent L distal pop/PT/AT arteries consistent with occlusion. 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 
- Hep gtt 
- no emergent vascular intervention - pt unstable - will eventually need aortogram with BLE r/o with possible intervention per VS 
  
EMMETT 
- monitor BMP 
- creatinine rising 
-nephrology consulted - appreciate services 
  
HTN 
- monitor  
  
Anemia 
-blood transfusion 
  
- Cont acceptable home medications for chronic conditions  
- DVT protocol I have personally reviewed all pertinent labs and films that have officially resulted over the last 24 hours. I have personally checked for all pending labs that are awaiting final results. Interval History Per H&P, \"Nette Lama is a [de-identified] y.o. female who was found down by her son. Javier Centeno had weakness and altered mental status.  She also had SOB.  She has a known history of Diabetes.  In the ER she was also found to have hypoglycemia. Javier Centeno is noted to have decreased pulses bilaterally.  She also was found to have significant respiratory distress and was started on BiPap.  Vascular surgery was called because of decreased perfusion to both feet.  She has severe erythema involving both feet as well as her abdomen and below her breast. Aleida Stamp is significant concern for sepsis along with her acute respiratory failure.  She is admitted for ongoing management. \" 
  
Hypoglycemia initially managed with D10 bolus and D20 infusion. Vascular surgery recommended heparin gtt and imaging. BLE duplex - b/l PAD, mod stenosis in R CF artery, absent R PT artery consistent with occlusion, absent L distal pop/PT/AT arteries consistent with occlusion. 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 No emergent surgical intervention per VS until patient stabilizes. Wound Care consulted. Patient also found to be in heart failure, bumex started. BCx 6/3 positive for coag neg staph in 1/2 bottles. Repeat BCx done 6/6. Creatinine noted to be rising gradually. PT/OT recommending SNF. ST recs puree/nectar thick liquids. Transferred out of ICU 6/10. Subjective Pt s/e @ bedside. No major events overnight.  Worked with Pt/OT was able to sit up in bed. States she does not feel any better today. Left arm remains weak Objective Visit Vitals /71 Pulse 79 Temp 97.9 °F (36.6 °C) Resp 18 Ht 5' 5\" (1.651 m) Wt 109.8 kg (242 lb) SpO2 96% BMI 40.27 kg/m² Physical Exam: 
General Appearance: NAD, minimally conversant, obese HENT: normocephalic/atraumatic, moist mucus membranes Neck: No JVD, supple Lungs: CTA with normal respiratory effort CV: RRR, no m/r/g Abdomen: soft, non-tender, active bowel sounds Extremities: no cyanosis, anasarca Neuro: no new focal deficits, remains weak left upper and left lower extrem, able to lift right side, sensation intact Psych: appropriate affect, alert and oriented to person, place and time, flat affect Intake and Output: 
Current Shift:  06/13 0701 - 06/13 1900 In: 390 Out: 1350 [[id number]:3213] Last three shifts:  06/11 1901 - 06/13 0700 In: 2464.7 [I.V.:1089.7] Out: 8131 [JYESJ:2645] Lab/Data Reviewed: All lab results for the last 24 hours reviewed. Imaging Reviewed: 
[facility] Result Date: 6/13/2019 EXAM: XR CHEST PORT CLINICAL INDICATION/HISTORY: Follow-up pulmonary infiltrates -Additional: None COMPARISON: Several prior chest radiographs, most recently June 7, 2019 TECHNIQUE: Portable frontal view of the chest _______________ FINDINGS: SUPPORT DEVICES: Right IJ central venous catheter in stable position. Nasogastric tube below the diaphragm, tip collimated from view. HEART AND MEDIASTINUM: Stable cardiac size and mediastinal contours. LUNGS AND PLEURAL SPACES: Similar degree of pulmonary inflation. Small left pleural effusion with left retrocardiac density demonstrated. Right lung mildly underexpanded but clear. No pneumothorax. BONY THORAX AND SOFT TISSUES: No acute osseous abnormality. Severe bilateral shoulder girdle osteoarthritis as previously. _______________ IMPRESSION: 1.  Right IJ central venous catheter and visualized nasogastric tube as above. 2. Small left pleural effusion with left retrocardiac atelectasis or airspace disease, unchanged. Medications Reviewed: 
Current Facility-Administered Medications Medication Dose Route Frequency  metoprolol tartrate (LOPRESSOR) tablet 25 mg  25 mg Oral BID  [START ON 6/14/2019] insulin glargine (LANTUS) injection 9 Units  9 Units SubCUTAneous DAILY  0.9% sodium chloride infusion 250 mL  250 mL IntraVENous PRN  
 amLODIPine (NORVASC) tablet 10 mg  10 mg Oral DAILY  bumetanide (BUMEX) tablet 1 mg  1 mg Oral BID  insulin lispro (HUMALOG) injection   SubCUTAneous Q6H  
 dextrose 5% infusion  25 mL/hr IntraVENous CONTINUOUS  
 potassium chloride (KLOR-CON) packet for solution 40 mEq  40 mEq Oral BID WITH MEALS  metoprolol (LOPRESSOR) injection 5 mg  5 mg IntraVENous Q6H PRN  pantoprazole (PROTONIX) 40 mg in sodium chloride 0.9% 10 mL injection  40 mg IntraVENous DAILY  0.9% sodium chloride infusion 250 mL  250 mL IntraVENous PRN  polyethylene glycol (MIRALAX) packet 17 g  17 g Oral DAILY  nystatin (MYCOSTATIN) 100,000 unit/mL oral suspension 500,000 Units  500,000 Units Oral QID  hydrALAZINE (APRESOLINE) 20 mg/mL injection 20 mg  20 mg IntraVENous Q4H PRN  
 dextrose 10% infusion 125-150 mL  125-150 mL IntraVENous PRN  
 acetaminophen (TYLENOL) tablet 650 mg  650 mg Oral Q4H PRN  
 sodium chloride (NS) flush 5-10 mL  5-10 mL IntraVENous PRN  
 heparin 25,000 units in D5W 250 ml infusion  18-36 Units/kg/hr IntraVENous TITRATE  levothyroxine (SYNTHROID) tablet 112 mcg  112 mcg Oral ACB  glucose chewable tablet 16 g  4 Tab Oral PRN  
 glucagon (GLUCAGEN) injection 1 mg  1 mg IntraMUSCular PRN Carlos Jay PA-C [address] [place], [place] [address] Hospital[facility] Division Pager: [phone] Office: [phone]

## 2019-06-13 NOTE — PROGRESS NOTES
Problem: Mobility Impaired (Adult and Pediatric) Goal: *Acute Goals and Plan of Care (Insert Text) Description Physical Therapy Goals Initiated 6/6/2019 and to be accomplished within 7 days. WILL CONTINUE GOALS THROUGH 6/20 1. Patient will complete all bed mobility with moderate assistance  in order to prepare for EOB/OOB activity. 2.  Patient will tolerate sitting EOB x 10 minutes in order to safely participate with PT.  
3.  Patient will perform sit <> stand with maximal assistance in order to prepare for OOB/gait activity. 4.  Patient will perform bed to chair transfers with maximal assistance in order to promote mobility and encourage seated activity to progress towards their prior level of function. 5.  Patient will participate in 10 minutes of B LE exercise/AROM with moderate assistance . Outcome: Progressing Towards Goal 
 PHYSICAL THERAPY RE-EVALUATION Patient: Brendon Horta (18 y.o. female) Date: 6/13/2019 Primary Diagnosis: Hypoglycemia [E16.2] PAD (peripheral artery disease) ([facility] [I73.9] Diabetes ([facility] [E11.9] Precautions:  Fall, Skin PLOF:  
 
ASSESSMENT : 
Patient supine in bed, agreeable to participation with PT. MAX A x 2 for supine > sit. Poor seated balance at EOB. Tactile and Kinesthetic cue to encourage seated balance and trunk activation. Patient requires MAX A to prevent L lateral/posterior LOB. Difficulty maintain seated balance or correcting lean with isometric trunk exercises. Tolerates sitting EOB x 15 minutes. Patient fatigued with activity and has c/o back pain with seated activity. Patient returned supine and left with all needs within reach. Back pain improved with rest.  
 
Patient educated on role of PT, PT POC, bed mobility, transfers, gait, and safety with mobility as indicated. Patient will continue to benefit from skilled PT in order to address poor activity tolerance; impaired balance, B LE weakness, and impaired functional mobility. Patient will benefit from skilled intervention to address the above impairments. Patient's rehabilitation potential is considered to be Fair Factors which may influence rehabilitation potential include:  
? None noted ? Mental ability/status ? Medical condition ? Home/family situation and support systems ? Safety awareness 
? Pain tolerance/management 
? Other: PLAN : 
Recommendations and Planned Interventions:  
?           Bed Mobility Training             ? Neuromuscular Re-Education ? Transfer Training                   ? Orthotic/Prosthetic Training 
? Gait Training                          ? Modalities ? Therapeutic Exercises           ? Edema Management/Control ? Therapeutic Activities            ? Family Training/Education ? Patient Education ? Other (comment): Frequency/Duration: Patient will be followed by physical therapy 3 -  5 times a week to address goals. Discharge Recommendations: Jack Ventura Further Equipment Recommendations for Discharge: rolling walker SUBJECTIVE:  
Patient stated ? Thank you.? OBJECTIVE DATA SUMMARY:  
Hospital course since last seen and reason for re-evaluation:  
Patient with prolonged hospital stay d/t multiple co-morbidities. Patient has had fluctuation in mentation limiting progression. Past Medical History:  
Diagnosis Date AC joint arthropathy   
 right Acne rosacea EMMETT (acute kidney injury) ([address] 06/03/2019 Bursitis of shoulder   
 rotator cuff Burst blood vessel in eye, left 02/2018  
 ruptured blood vessel left Carotid arterial disease ([address] 06/03/2019 Diabetes ([address] Diabetic retinopathy ([address] severe, non-proliferative Discoid lupus Duodenal ulcer Facet joint disease   
 cervical spine, injections Fibromyalgia Hiatal hernia Hypercholesterolemia Hypertension   
 hypothyroid Impingement syndrome of shoulder   
 left Lumbar scoliosis Nonexudative age-related macular degeneration   
 severe Open-angle glaucoma   
 severe PAD (peripheral artery disease) ([facility] 06/03/2019  
 severe, bilateral lower extremities Rectocele Sepsis associated hypotension ([facility] 06/03/2019 Past Surgical History:  
Procedure Laterality Date COLONOSCOPY  2007  
 polyps, Dr. James Villavicencio [facility] ADMIN HEPATITIS B VACC  unk HX CARPAL TUNNEL RELEASE    
 HX CHOLECYSTECTOMY  2007 HX KNEE ARTHROSCOPY HX SHOULDER ARTHROSCOPY    
 right Barriers to Learning/Limitations: yes;  physical and altered mental status (i.e.Sedation, Confusion) Compensate with: Visual Cues, Verbal Cues, Tactile Cues and Kinesthetic Cues Home Situation:  
Home Situation Home Environment: Private residence # Steps to Enter: 4 One/Two Story Residence: One story Living Alone: Yes Support Systems: Child(rtessa) Patient Expects to be Discharged to[de-identified] Unknown Current DME Used/Available at Home: Shower chair, Adaptive dressing aides Tub or Shower Type: Tub/Shower combination(has shower chair; no grab bars) Critical Behavior: 
Neurologic State: Eyes open to voice Orientation Level: Oriented to person Cognition: Decreased attention/concentration;Decreased command following Safety/Judgement: Not assessed Psychosocial 
Patient Behaviors: Not interactive Purposeful Interaction: Yes 
  
Strength:   
Strength: Grossly decreased, non-functional(Unable to clear heels bilaterraly (<3/5)) Tone & Sensation:  
Tone: Normal(B LEs) Range Of Motion: 
AROM: Grossly decreased, non-functional(B LE AROM impaired d/t weakness) PROM: Within functional limits Functional Mobility: 
Bed Mobility: 
  
Supine to Sit: Maximum assistance Sit to Supine: Maximum assistance Scooting: Total assistance Transfers: 
Sit to Stand: (not assessed) Balance: Sitting: (not assessed secondary to 1 person assist) Sitting - Static: Poor (constant support) Sitting - Dynamic: Poor (constant support) Standing: (not assessed) Pain: Back pain in sitting position Pain level pre-treatment: 0/10 Pain level post-treatment: 0/10 Pain Intervention(s) : Rest 
Activity Tolerance:  
Fair Please refer to the flowsheet for vital signs taken during this treatment. After treatment:  
?         Patient left in no apparent distress sitting up in chair ? Patient left in no apparent distress in bed 
? Call bell left within reach ? Nursing notified ? Caregiver present ? Bed alarm activated ? SCDs applied COMMUNICATION/EDUCATION:  
?         Role of Physical Therapy in the acute care setting. ?         Fall prevention education was provided and the patient/caregiver indicated understanding. ? Patient/family have participated as able in goal setting and plan of care. ?         Patient/family agree to work toward stated goals and plan of care. ?         Patient understands intent and goals of therapy, but is neutral about his/her participation. ? Patient is unable to participate in goal setting/plan of care: ongoing with therapy staff. ?         Other: Thank you for this referral. 
Tri Guardado Time Calculation: 23 mins

## 2019-06-13 NOTE — PROGRESS NOTES
Problem: Gas Exchange - Impaired Goal: *Absence of hypoxia Outcome: Progressing Towards Goal 
 Respiratory Therapy Assessment Care Plan Patient: 
Monica Uribe [de-identified] y.o. female 6/13/2019 12:54 PM 
 
Hypoglycemia [E16.2] PAD (peripheral artery disease) ([facility] [I73.9] Diabetes ([facility] [E11.9] Chest X-RAY:  
Results from Hospital Encounter encounter on 06/03/19 XR CHEST PORT Impression IMPRESSION: 
 
 
1. Right IJ central venous catheter and visualized nasogastric tube as above. 2. Small left pleural effusion with left retrocardiac atelectasis or airspace 
disease, unchanged. XR ABD (KUB) Impression IMPRESSION: 
 
Nasogastric tube tip at the expected position of the distal stomach. XR ABD (KUB) Impression IMPRESSION: 
 
Nasogastric tube tip at the EG junction. Telephone report was called to ICU 
staff at 8:45 AM on 6/8/2019. Vital Signs:   Visit Vitals /71 Pulse 79 Temp 97.9 °F (36.6 °C) Resp 18 Ht 5' 5\" (1.651 m) Wt 109.8 kg (242 lb) SpO2 96% BMI 40.27 kg/m² Indications for treatment: Hypercapnia and CHF, per CXR, left basilar lung opacity (atelectasis/aspiration/infection/pleural effusion) Plan of care: BIPAP QHS, O2 therapy, Vest therapy Q4 Goal: Oxygenation, airway clearance
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
## 2019-06-13 NOTE — PROGRESS NOTES
Problem: Self Care Deficits Care Plan (Adult) Goal: *Acute Goals and Plan of Care (Insert Text) Description Occupational Therapy Goals Initiated 6/6/2019 within 7 day(s). Re-evaluated on 6/13/19 following one week on skilled OT caseload. Pt minimally interactive, minimal command following; LUE nonfunctional. Pt is not appropriate for skilled therapy at this time; will implement Functional Maintenance Plan at this time. 1.  Patient will perform grooming tasks at EOB with moderate assistance and minimal verbal cues for attention to task. 2.  Patient will perform upper body dressing with moderate assistance and fair dynamic sitting balance. 3.  Patient will perform functional task at EOB for 8 minutes with minimal assistance for balance and < 5 rest breaks to increase activity tolerance for ADLs. 4.  Patient will perform toilet transfers with maximum assistance. 5.  Patient will perform all aspects of toileting with maximum assistance. Luz Martinez 6.  Patient will participate in upper extremity therapeutic exercise/activities for 8 minutes with minimal assistance to increase BUE strength/AROM for functional transfers and ADLs. 7.  Patient will utilize energy conservation techniques during functional activities with minimal verbal cues. UPDATED GOALS Duong Harris MS OTR/L; 6/13/19) 1. Patient will perform AAROM to BUEs for maintenance of ROM, strength and skin integrity in prep for ADLs. Outcome: Progressing Towards Goal 
  
OCCUPATIONAL THERAPY RE-EVALUATION Patient: Andrew Todd (18 y.o. female) Date: 6/13/2019 Primary Diagnosis: Hypoglycemia [E16.2] PAD (peripheral artery disease) ([facility] [I73.9] Diabetes ([facility] [address] [E11.9] Precautions:  Fall, Skin PLOF: Pt was independent with ADLs.  
 
ASSESSMENT : 
Based on the objective data described below, the patient presents with impairments with regard to alertness & command following, activity tolerance and independence in ADLs. Pt re-evaluated following on week on skilled OT caseload. Pt minimally interactive, minimal to no command following, increased time to respond to therapist's questions. BUEs continue to be edematous; LUE with minimal to no muscle function (fair strength of L trapezius; flaccid distal to trapezius); this is a significant change since initial evaluation & Stephanie RN made aware. Pt unable to bring washcloth to face in prep for hygiene; resisting PROM of RUE. PROM of LUE to maintain ROM & joint integrity x10 each. EOB & OOB activity not assessed due to 1 person assist & decreased cooperation & alertness of pt. Pt is no longer appropriate for skilled therapy. At this time, pt would benefit from functional maintenance program to maintain/increase UE ROM and strength for ADL tasks. Rehab tech educated on and verbalized appropriate UE exercises for patient. Dhaval Crawford CM notified of updated OT POC. Pt left with needs within reach. Patient will benefit from skilled intervention to address the above impairments. Patient's rehabilitation potential is considered to be Fair Factors which may influence rehabilitation potential include: ? None noted ? Mental ability/status ? Medical condition ? Home/family situation and support systems ? Safety awareness ? Pain tolerance/management ? Other: PLAN : 
Recommendations and Planned Interventions:  
?               Self Care Training                  ? Therapeutic Activities ? Functional Mobility Training   ? Cognitive Retraining 
? Therapeutic Exercises           ? Endurance Activities ? Balance Training                    ? Neuromuscular Re-Education ? Visual/Perceptual Training     ? Home Safety Training ?               Patient Education                   ? Family Training/Education ? Other (comment): Frequency/Duration: Patient will be followed by rehab tech 3-5 times a week to address goals, weekly by OT. Discharge Recommendations: SNF vs. Home with hospice Further Equipment Recommendations for Discharge: hospital bed SUBJECTIVE:  
Patient stated \"Morning. ? OBJECTIVE DATA SUMMARY:  
Hospital course since last seen and reason for reevaluation: Re-evaluated on 6/13/19 following one week on skilled OT caseload. Pt minimally interactive, minimal command following; LUE nonfunctional. Pt is not appropriate for skilled therapy at this time; will implement Functional Maintenance Plan at this time. Past Medical History:  
Diagnosis Date AC joint arthropathy   
 right Acne rosacea EMMETT (acute kidney injury) ([address] 06/03/2019 Bursitis of shoulder   
 rotator cuff Burst blood vessel in eye, left 02/2018  
 ruptured blood vessel left Carotid arterial disease ([address] 06/03/2019 Diabetes ([address] Diabetic retinopathy ([address] severe, non-proliferative Discoid lupus Duodenal ulcer Facet joint disease   
 cervical spine, injections Fibromyalgia Hiatal hernia Hypercholesterolemia Hypertension   
 hypothyroid Impingement syndrome of shoulder   
 left Lumbar scoliosis Nonexudative age-related macular degeneration   
 severe Open-angle glaucoma   
 severe PAD (peripheral artery disease) ([address] 06/03/2019  
 severe, bilateral lower extremities Rectocele Sepsis associated hypotension ([address] 06/03/2019 Past Surgical History:  
Procedure Laterality Date COLONOSCOPY  2007  
 polyps, Dr. Petra Avila [facility] ADMIN HEPATITIS B VACC  unk HX CARPAL TUNNEL RELEASE    
 HX CHOLECYSTECTOMY  2007 HX KNEE ARTHROSCOPY HX SHOULDER ARTHROSCOPY    
 right Barriers to Learning/Limitations: yes;  altered mental status (i.e.Sedation, Confusion) Compensate with: visual, verbal, tactile, kinesthetic cues/model Home Situation:  
Home Situation Home Environment: Private residence # Steps to Enter: 4 One/Two Story Residence: One story Living Alone: Yes Support Systems: Child(tressa) Patient Expects to be Discharged to[de-identified] Unknown Current DME Used/Available at Home: Shower chair, Adaptive dressing aides Tub or Shower Type: Tub/Shower combination(has shower chair; no grab bars) ? Right hand dominant   ? Left hand dominant Cognitive/Behavioral Status: 
Neurologic State: Eyes open to voice Orientation Level: Oriented to person Cognition: Decreased attention/concentration;Decreased command following Safety/Judgement: Not assessed Skin: Intact (BUEs) Edema: None noted (BUEs) Vision/Perceptual:   
Acuity: (unable to accurately assess) Coordination: BUE Coordination: Grossly decreased, non-functional(LUE nonfunctional) Fine Motor Skills-Upper: Left Impaired;Right Intact Gross Motor Skills-Upper: Left Impaired;Right Intact Balance: 
Sitting: (not assessed secondary to 1 person assist) Standing: (not assessed) Strength: BUE Strength: Grossly decreased, non-functional(RUE 2+/5; LUE 2/5 trapezius; 0/5 deltoid, bicep, tricep) Tone & Sensation: BUE Generally decreased (LUE) Range of Motion: BUE 
AROM: Grossly decreased, non-functional(RUE < 1/2 elbow flex; LUE shoulder shrug; no other AROM) PROM: Within functional limits Functional Mobility and Transfers for ADLs: 
Bed Mobility: 
Supine to Sit: (not assessed) Transfers: 
Sit to Stand: (not assessed) ADL Assessment:  
Feeding: Total assistance Oral Facial Hygiene/Grooming: Maximum assistance Bathing: Total assistance Upper Body Dressing: Total assistance Lower Body Dressing: Total assistance Toileting: Total assistance Cognitive Retraining Safety/Judgement: Not assessed Neuromuscular Reeducation: Sensory stim to LUE to increase pt attention to LUE and in prep for improved muscle function. PROM of LUE in all planes x10 each with moderate verbal cues for visual tracking & scanning for improved re-education. Pain: 
Pain level pre-treatment: 0/10 Pain level post-treatment: 0/10 Activity Tolerance:  Poor Please refer to the flowsheet for vital signs taken during this treatment. After treatment:  
? Patient left in no apparent distress sitting up in chair ? Patient left in no apparent distress in bed 
? Call bell left within reach ? Nursing notified ? Caregiver present ? Bed alarm activated COMMUNICATION/EDUCATION:  
? Role of Occupational Therapy in the acute care setting 
? Home safety education was provided and the patient/caregiver indicated understanding. ? Patient/family have participated as able in goal setting and plan of care. ? Patient/family agree to work toward stated goals and plan of care. ? Patient understands intent and goals of therapy, but is neutral about his/her participation. ? Patient is unable to participate in goal setting and plan of care. Thank you for this referral. 
Satira Saint, MS OTR/L Time Calculation: 20 mins

## 2019-06-13 NOTE — HOSPICE
Family meeting. Spoke with Migdalia Richard, son/MPOA to discuss Hospice criteria, philosophy, services and IDT. [facility] brochure and number placed on white board. Son wants to discuss Hospice with family prior to making a decision. Discharge plan would  be home on Monday as his street inaccessible until then. Spoke to Law POE to update. Will continue to monitor. Thank you for the referral to [facility] to care for Pt and family. If there is an acute change in patient status, please call [facility] at [phone].

## 2019-06-13 NOTE — ROUTINE PROCESS
Bedside and Verbal shift change report given to [facility] RN (oncoming nurse) by Radha Chiu RN (offgoing nurse). Report included the following information SBAR, Intake/Output, MAR and Recent Results. APTT therapeutic. Hgb post-transfusion 7.4. Bedside and Verbal shift change report given to Stephanie RN (oncoming nurse) by [facility] RN (offgoing nurse). Report included the following information SBAR, Intake/Output, MAR, Recent Results and Cardiac Rhythm afib, ST with frequent PVCs. Luz Martinez

## 2019-06-13 NOTE — PROGRESS NOTES
Pulmonary progress note 
  
[de-identified]year-old woman admitted to the hospital with mental status changes secondary to hypoglycemia and hypercapnia. Hypoglycemia initially appeared to be related to an inadvertent overdose of glipizide. Hypoglycemia, persists. CO2 retention is improving. Cessation of Ultram also appeared to help her mental status. 
  
The patient while alert is only minimally interactive. She does track towards voice, but stares somewhat vacantly more than participates in a conversation. I saw the patient while she was receiving physical therapy. 
  
No respiratory distress at rest 
Afebrile. Mid 90s on 2 L nasal cannula. No movement of left hand Diffuse pitting edema persists, but it appears slightly better than a few days ago. Leg erythema effectively resolved. Edema improved, but the legs have been wrapped No wheezing. Irregular rhythm but rate controlled. Abdomen soft. 
  
Labs WBC 19.6. No bands. Hemoglobin 7.4 PTT 92.1. On heparin drip for lower extremity peripheral arterial disease Creatinine 1.46 and slowly improving. Urine output remains excellent. 
  
Assessment Altered mental status on admission likely reflecting Ultram, hypoglycemia and hypercapnia Persistent hypoglycemia of unclear etiology Acute renal insufficiency resolving Volume overload improving Leukocytosis without clear sign of infection Anemia of unclear etiology Atrial fibrillation and peripheral arterial disease on anticoagulation. The patient was on a heparin drip because of the possibility of dialysis catheter placement. Consider transition to oral therapy. Discussion of hospice noted. While in a sense the patient is improving, in a greater sense she is making only small strides forward, and certainly not enough to get her home or anywhere near independent living. Given my understanding of the patient's premorbid condition, hospice is a reasonable consideration. 
  
Plan Continue diuretics Continue physical therapy Transfuse as indicated Continue hospice evaluation

## 2019-06-13 NOTE — PROGRESS NOTES
Problem: Falls - Risk of 
Goal: *Absence of Falls Description Document Kimmie Monroy Fall Risk and appropriate interventions in the flowsheet. Outcome: Progressing Towards Goal 
  
Problem: Pressure Injury - Risk of 
Goal: *Prevention of pressure injury Description Document Tate Scale and appropriate interventions in the flowsheet. Outcome: Progressing Towards Goal 
  
Problem: Hypertension Goal: *Blood pressure within specified parameters Outcome: Progressing Towards Goal 
Goal: *Fluid volume balance Outcome: Progressing Towards Goal 
Goal: *Labs within defined limits Outcome: Progressing Towards Goal 
  
Problem: Diabetes Self-Management Goal: *Disease process and treatment process Description Define diabetes and identify own type of diabetes; list 3 options for treating diabetes. Outcome: Progressing Towards Goal 
Goal: *Incorporating nutritional management into lifestyle Description Describe effect of type, amount and timing of food on blood glucose; list 3 methods for planning meals. Outcome: Progressing Towards Goal 
Goal: *Incorporating physical activity into lifestyle Description State effect of exercise on blood glucose levels. Outcome: Progressing Towards Goal 
Goal: *Developing strategies to promote health/change behavior Description Define the ABC's of diabetes; identify appropriate screenings, schedule and personal plan for screenings. Outcome: Progressing Towards Goal 
Goal: *Using medications safely Description State effect of diabetes medications on diabetes; name diabetes medication taking, action and side effects. Outcome: Progressing Towards Goal 
Goal: *Monitoring blood glucose, interpreting and using results Description Identify recommended blood glucose targets  and personal targets. Outcome: Progressing Towards Goal 
Goal: *Prevention, detection, treatment of acute complications Description List symptoms of hyper- and hypoglycemia; describe how to treat low blood sugar and actions for lowering  high blood glucose level. Outcome: Progressing Towards Goal 
Goal: *Prevention, detection and treatment of chronic complications Description Define the natural course of diabetes and describe the relationship of blood glucose levels to long term complications of diabetes. Outcome: Progressing Towards Goal 
Goal: *Developing strategies to address psychosocial issues Description Describe feelings about living with diabetes; identify support needed and support network Outcome: Progressing Towards Goal 
Goal: *Sick day guidelines Outcome: Progressing Towards Goal 
Goal: *Patient Specific Goal (EDIT GOAL, INSERT TEXT) Outcome: Progressing Towards Goal 
  
Problem: Pain Goal: *Control of Pain Outcome: Progressing Towards Goal 
Goal: *PALLIATIVE CARE:  Alleviation of Pain Outcome: Progressing Towards Goal 
  
Problem: Tissue Perfusion - Cardiopulmonary, Altered Goal: *Optimize tissue perfusion Outcome: Progressing Towards Goal 
Goal: *Absence of hypoxia Outcome: Progressing Towards Goal 
  
Problem: Fluid Volume - Risk of, Imbalanced Goal: *Balanced intake and output Outcome: Progressing Towards Goal 
  
Problem: Patient Education: Go to Patient Education Activity Goal: Patient/Family Education Outcome: Progressing Towards Goal 
  
Problem: Discharge Planning Goal: *Discharge to safe environment Outcome: Progressing Towards Goal 
  
Problem: Gas Exchange - Impaired Goal: *Absence of hypoxia Outcome: Progressing Towards Goal 
  
Problem: Nutrition Deficit Goal: *Optimize nutritional status Outcome: Progressing Towards Goal 
  
Problem: General Wound Care Goal: *Non-infected wound: Improvement of existing wound, absence of infection, and maintenance of skin integrity Outcome: Progressing Towards Goal 
Goal: *Infected Wound: Prevention of further infection and promotion of healing Description Infection control procedures (eg: clean dressings, clean gloves, hand washing, precautions to isolate wound from contamination, sterile instruments used for wound debridement) should be implemented. Outcome: Progressing Towards Goal 
Goal: Interventions Outcome: Progressing Towards Goal 
  
Problem: Patient Education: Go to Patient Education Activity Goal: Patient/Family Education Outcome: Progressing Towards Goal 
  
Problem: Patient Education: Go to Patient Education Activity Goal: Patient/Family Education Outcome: Progressing Towards Goal 
  
Problem: Sepsis: Day 3 Goal: Off Pathway (Use only if patient is Off Pathway) Outcome: Progressing Towards Goal 
Goal: *Central Venous Pressure maintained at 8-12 mm Hg Outcome: Progressing Towards Goal 
Goal: *Oxygen saturation within defined limits Outcome: Progressing Towards Goal 
Goal: *Vital sign stability Outcome: Progressing Towards Goal 
Goal: *Tolerating diet Outcome: Progressing Towards Goal 
Goal: *Demonstrates progressive activity Outcome: Progressing Towards Goal 
Goal: Activity/Safety Outcome: Progressing Towards Goal 
Goal: Consults, if ordered Outcome: Progressing Towards Goal 
Goal: Diagnostic Test/Procedures Outcome: Progressing Towards Goal 
Goal: Nutrition/Diet Outcome: Progressing Towards Goal 
Goal: Discharge Planning Outcome: Progressing Towards Goal 
Goal: Medications Outcome: Progressing Towards Goal 
Goal: Respiratory Outcome: Progressing Towards Goal 
Goal: Treatments/Interventions/Procedures Outcome: Progressing Towards Goal 
Goal: Psychosocial 
Outcome: Progressing Towards Goal 
  
Problem: Sepsis: Day 4 Goal: Off Pathway (Use only if patient is Off Pathway) Outcome: Progressing Towards Goal 
Goal: Activity/Safety Outcome: Progressing Towards Goal 
Goal: Consults, if ordered Outcome: Progressing Towards Goal 
Goal: Diagnostic Test/Procedures Outcome: Progressing Towards Goal 
Goal: Nutrition/Diet Outcome: Progressing Towards Goal 
Goal: Discharge Planning Outcome: Progressing Towards Goal 
Goal: Medications Outcome: Progressing Towards Goal 
Goal: Respiratory Outcome: Progressing Towards Goal 
Goal: Treatments/Interventions/Procedures Outcome: Progressing Towards Goal 
Goal: Psychosocial 
Outcome: Progressing Towards Goal 
Goal: *Oxygen saturation within defined limits Outcome: Progressing Towards Goal 
Goal: *Hemodynamically stable Outcome: Progressing Towards Goal 
Goal: *Vital signs within defined limits Outcome: Progressing Towards Goal 
Goal: *Tolerating diet Outcome: Progressing Towards Goal 
Goal: *Demonstrates progressive activity Outcome: Progressing Towards Goal 
Goal: *Fluid volume maintenance Outcome: Progressing Towards Goal 
  
Problem: Sepsis: Day 5 Goal: Off Pathway (Use only if patient is Off Pathway) Outcome: Progressing Towards Goal 
Goal: *Oxygen saturation within defined limits Outcome: Progressing Towards Goal 
Goal: *Vital signs within defined limits Outcome: Progressing Towards Goal 
Goal: *Tolerating diet Outcome: Progressing Towards Goal 
Goal: *Demonstrates progressive activity Outcome: Progressing Towards Goal 
Goal: *Discharge plan identified Outcome: Progressing Towards Goal 
Goal: Activity/Safety Outcome: Progressing Towards Goal 
Goal: Consults, if ordered Outcome: Progressing Towards Goal 
Goal: Diagnostic Test/Procedures Outcome: Progressing Towards Goal 
Goal: Nutrition/Diet Outcome: Progressing Towards Goal 
Goal: Discharge Planning Outcome: Progressing Towards Goal 
Goal: Medications Outcome: Progressing Towards Goal 
Goal: Respiratory Outcome: Progressing Towards Goal 
Goal: Treatments/Interventions/Procedures Outcome: Progressing Towards Goal 
Goal: Psychosocial 
Outcome: Progressing Towards Goal 
  
Problem: Sepsis: Day 6 Goal: Off Pathway (Use only if patient is Off Pathway) Outcome: Progressing Towards Goal 
Goal: *Oxygen saturation within defined limits Outcome: Progressing Towards Goal 
 Goal: *Vital signs within defined limits Outcome: Progressing Towards Goal 
Goal: *Tolerating diet Outcome: Progressing Towards Goal 
Goal: *Demonstrates progressive activity Outcome: Progressing Towards Goal 
Goal: Influenza immunization Outcome: Progressing Towards Goal 
Goal: *Pneumococcal immunization Outcome: Progressing Towards Goal 
Goal: Activity/Safety Outcome: Progressing Towards Goal 
Goal: Diagnostic Test/Procedures Outcome: Progressing Towards Goal 
Goal: Nutrition/Diet Outcome: Progressing Towards Goal 
Goal: Discharge Planning Outcome: Progressing Towards Goal 
Goal: Medications Outcome: Progressing Towards Goal 
Goal: Respiratory Outcome: Progressing Towards Goal 
Goal: Treatments/Interventions/Procedures Outcome: Progressing Towards Goal 
Goal: Psychosocial 
Outcome: Progressing Towards Goal 
  
Problem: Sepsis: Discharge Outcomes Goal: *Vital signs within defined limits Outcome: Progressing Towards Goal 
Goal: *Tolerating diet Outcome: Progressing Towards Goal 
Goal: *Verbalizes understanding and describes prescribed diet Outcome: Progressing Towards Goal 
Goal: *Demonstrates progressive activity Outcome: Progressing Towards Goal 
Goal: *Describes follow-up/return visits to physicians Outcome: Progressing Towards Goal 
Goal: *Verbalizes name, dosage, time, side effects, and number of days to continue medications Outcome: Progressing Towards Goal 
Goal: *Influenza immunization (Oct-Mar only) Outcome: Progressing Towards Goal 
Goal: *Pneumococcal immunization Outcome: Progressing Towards Goal 
Goal: *Lungs clear or at baseline Outcome: Progressing Towards Goal 
Goal: *Oxygen saturation returns to baseline or 90% or better on room air Outcome: Progressing Towards Goal 
Goal: *Glycemic control Outcome: Progressing Towards Goal 
Goal: *Absence of deep venous thrombosis signs and symptoms(Stroke Metric) Outcome: Progressing Towards Goal 
 Goal: *Describes available resources and support systems Outcome: Progressing Towards Goal 
Goal: *Optimal pain control at patient's stated goal 
Outcome: Progressing Towards Goal 
  
Problem: Discharge Planning Goal: *Discharge to safe environment Outcome: Progressing Towards Goal 
  
Problem: Gas Exchange - Impaired Goal: *Absence of hypoxia Outcome: Progressing Towards Goal 
  
Problem: Patient Education: Go to Patient Education Activity Goal: Patient/Family Education Outcome: Progressing Towards Goal 
  
Problem: Gas Exchange - Impaired Goal: *Absence of hypoxia Outcome: Progressing Towards Goal 
  
Problem: Patient Education: Go to Patient Education Activity Goal: Patient/Family Education Outcome: Progressing Towards Goal

## 2019-06-13 NOTE — PROGRESS NOTES
Bedside shift change report given to KVNG Brown (oncoming nurse) by Chalo Armstrong RN (offgoing nurse). Report included the following information SBAR, Kardex, Intake/Output, MAR and Recent Results. 
   
0911 -- AM medications given, well tolerated, will continue to monitor. 
   
1149 -- Reassessment completed, no change in patient condition, will continue to monitor. 
   
1526 -- Reassessment completed, no change in patient condition, will continue to monitor. Called Eliana Watson (LUI), Freddy FREDERICK), and Jenny Dale to meet with the family, son wants Hospice. 1810 -- Call from tele-monitor, patient had 9 beats of V-Tach. 
   
Bedside shift change report given to Chalo Armstrong RN (oncoming nurse) by Tammy Womack RN (offgoing nurse). Report included the following information SBAR, Kardex, Intake/Output, MAR and Recent Results.

## 2019-06-14 NOTE — HOSPICE
Writer called Scar, son/NIRALI in patient room. He informed this nurse that he would like to go home on Monday with Hospice.

## 2019-06-14 NOTE — PROGRESS NOTES
RT called to bedside to help pt with labored breathing and weak cough that is unable to mobilize secretions. Pt has been refusing CPT as well as PT/OT, all of which can contribute to her inability to mobilize secretions. Pt agreed to 5 minutes of CPT via Vest at this time to help mobilize secretions. Pt may also benefit from a mucolytic. Will continue to monitor closely.  
KFL

## 2019-06-14 NOTE — PROGRESS NOTES
Per discussion with PT, pt verbalizing that she no longer wishes to participate with PT/OT. Pt changed from skilled therapy to Functional Maintenance Plan on 6/13/19 secondary to significant decline in functional status. Per pt's request, will discontinue OT orders at this time. Please re-order if pt wishes to restart participation with therapy. Meredith Mao MS OTR/L Office Ext: [phone]

## 2019-06-14 NOTE — PROGRESS NOTES
Internal Medicine Progress Note Patient's Name: Moraima Briggs Admit Date: 6/3/2019 Length of Stay: 11 Assessment/Plan Principal Problem: 
  Sepsis ([facility] (6/3/2019) Active Problems: 
  Acute respiratory failure ([facility] (6/3/2019) Type 2 diabetes with nephropathy ([facility] (3/23/2018) PAD (peripheral artery disease) ([facility] (6/3/2019) Acute on chronic combined systolic and diastolic heart failure ([facility] (6/4/2019) 
 
  hypothyroid () Essential hypertension (9/3/2015) Obesity (5/9/2019) Hypoglycemia (6/3/2019) Cellulitis (6/3/2019) Anemia (6/8/2019) Acute encephalopathy (6/9/2019) Metabolic alkalosis (8/74/5321) Sepsis - possible 2/2 BLE cellulitis - PCCM consulted - appreciate 
- leukocytosis - wound care for BLE 
- 1/2 BCx 6/3 positive for coag neg staph 
- repeat BCx NGTD 
  
Acute encephalopathy  
-most likely multifactorial due to metabolic conditions, maybe hemorrhagic  
-CT head 6/8: ddx includes microvascular ischemic change, demylination, and white matter infarct 
-improving 
  
Acute Resp Failure 
- O2 as needed - tx CHF 
  
Diabetes 
- monitor BG 
- lantus/SSI 
  
Acute on chronic CHF 
- BNP 52431, left pleural effusion 
- monitor BMP 
- strict I&Os 
  
PAD with arterial occlusion - VS consulted - appreciate -  BLE duplex - b/l PAD, mod stenosis in R CF artery, absent R PT artery consistent with occlusion, absent L distal pop/PT/AT arteries consistent with occlusion. 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 
- Hep gtt 
- no emergent vascular intervention  
  
HTN 
- BP control 
  
- Cont acceptable home medications for chronic conditions  
- DVT protocol I have personally reviewed all pertinent labs and films that have officially resulted over the last 24 hours. I have personally checked for all pending labs that are awaiting final results. Interval History Per H&P, \"Nette Lama is a [de-identified] y.o. female who was found down by her son.  She had weakness and altered mental status.  She also had SOB.  She has a known history of Diabetes.  In the ER she was also found to have hypoglycemia. [facility] is noted to have decreased pulses bilaterally.  She also was found to have significant respiratory distress and was started on BiPap.  Vascular surgery was called because of decreased perfusion to both feet.  She has severe erythema involving both feet as well as her abdomen and below her breast. Stephen Reynolds is significant concern for sepsis along with her acute respiratory failure.  She is admitted for ongoing management. \" 
  
Hypoglycemia initially managed with D10 bolus and D20 infusion. Vascular surgery recommended heparin gtt and imaging. BLE duplex - b/l PAD, mod stenosis in R CF artery, absent R PT artery consistent with occlusion, absent L distal pop/PT/AT arteries consistent with occlusion. 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 No emergent surgical intervention per VS until patient stabilizes. Wound Care consulted. Patient also found to be in heart failure, bumex started. BCx 6/3 positive for coag neg staph in 1/2 bottles. Repeat BCx done 6/6. Creatinine noted to be rising gradually. PT/OT recommending SNF. ST recs puree/nectar thick liquids. Transferred out of ICU 6/10. POA agreeable to home with hospice on 6/14. Subjective Pt s/e @ bedside. No major events overnight. Patient has no complaints this morning. Objective Visit Vitals /60 (BP 1 Location: Left arm, BP Patient Position: At rest) Pulse 93 Temp 97 °F (36.1 °C) Resp 16 Ht 5' 5\" (1.651 m) Wt 109.4 kg (241 lb 1.6 oz) SpO2 95% BMI 40.12 kg/m² Physical Exam: 
General Appearance: NAD, minimally conversant, obese HENT: normocephalic/atraumatic, moist mucus membranes Neck: No JVD, supple Lungs: CTA with normal respiratory effort CV: RRR, no m/r/g Abdomen: soft, non-tender, active bowel sounds Extremities: no cyanosis Neuro: no new focal deficits Intake and Output: 
Current Shift:  No intake/output data recorded. Last three shifts:   0701 -  1900 In: 6997 Out: 2702 [ABLX] Lab/Data Reviewed: All lab results for the last 24 hours reviewed. Imaging Reviewed: 
No results found. Medications Reviewed: 
Current Facility-Administered Medications Medication Dose Route Frequency  dicyclomine (BENTYL) capsule 10 mg  10 mg Oral TID  metoprolol tartrate (LOPRESSOR) tablet 25 mg  25 mg Oral BID  insulin glargine (LANTUS) injection 9 Units  9 Units SubCUTAneous DAILY  amLODIPine (NORVASC) tablet 10 mg  10 mg Oral DAILY  bumetanide (BUMEX) tablet 1 mg  1 mg Oral BID  insulin lispro (HUMALOG) injection   SubCUTAneous Q6H  
 potassium chloride (KLOR-CON) packet for solution 40 mEq  40 mEq Oral BID WITH MEALS  metoprolol (LOPRESSOR) injection 5 mg  5 mg IntraVENous Q6H PRN  pantoprazole (PROTONIX) 40 mg in sodium chloride 0.9% 10 mL injection  40 mg IntraVENous DAILY  polyethylene glycol (MIRALAX) packet 17 g  17 g Oral DAILY  nystatin (MYCOSTATIN) 100,000 unit/mL oral suspension 500,000 Units  500,000 Units Oral QID  hydrALAZINE (APRESOLINE) 20 mg/mL injection 20 mg  20 mg IntraVENous Q4H PRN  
 dextrose 10% infusion 125-150 mL  125-150 mL IntraVENous PRN  
 acetaminophen (TYLENOL) tablet 650 mg  650 mg Oral Q4H PRN  
 sodium chloride (NS) flush 5-10 mL  5-10 mL IntraVENous PRN  
 heparin 25,000 units in D5W 250 ml infusion  18-36 Units/kg/hr IntraVENous TITRATE  levothyroxine (SYNTHROID) tablet 112 mcg  112 mcg Oral ACB  glucose chewable tablet 16 g  4 Tab Oral PRN  
 glucagon (GLUCAGEN) injection 1 mg  1 mg IntraMUSCular PRN Violeta Lam PA-C [address] [place], [place] [address] Hospitalist Division Office:  [phone] Pager: [phone]

## 2019-06-14 NOTE — PROGRESS NOTES
Patient completed ROM as follows. LUE X 5 (Wrist: flex/ext, supination, shoulder flexion, biceps curls) Pt participation was: 
() AROM/ AAROM 
(x) PROM Pain Level Before FMP: unable to verbalize Pain Level After FMP: unable to verbalize Non Verbal Pain Scale : 2 (Face scale) (x) Alerted Nursing. (x) Call bell within patient reach. (x) Pt resting in no apparent distress in bed. NOTE:  
Passively performed ROM exercises for LUE while pt was speaking to PT. Pt requests to discontinue services. [facility]

## 2019-06-14 NOTE — PROGRESS NOTES
Problem: Gas Exchange - Impaired Goal: *Absence of hypoxia Outcome: Progressing Towards Goal 
  
Problem: Patient Education: Go to Patient Education Activity Goal: Patient/Family Education Outcome: Progressing Towards Goal

## 2019-06-14 NOTE — PROGRESS NOTES
Patient received in bed awake. Patient A&Ox3; reoriented to time and situation. Denies having pain. No distress noted. Frequently use items within reach. Bed locked in low position. Call bell within reach and Patient verbalized understanding of use for assistance and needs. 1445- Patient awake. Phlebotomist obtained PTT, now leaving bedside. Call bell within reach. 1-  Patient's son Araseli Donovan to bedside. Partha Lott, RN (Hospice) was called at her request made4 aware that son to bedside; voiced understanding. No PTT results posted at this time. 1559- Patient's /58, HR 89. See MAR for prn Hydralazine to be given. Araseli Donovan (son) at bedside and call bell w/in reach. 1700- Patient awake. F/u /54. Call bell w/in reach. 1805- Dr. She Hurley was called made aware that Josselyn Garcia (Telemetry monitoring nurse) had call and reported that Patient's HR increased to 139. Patient noted to be awake, lying in bed with no distress. Dr. She Hurley voiced understanding. 1840- Patient awake. No PTT results posted at this time. Phlebotomist # [phone] was called several times concerning PTT results; no answer. 1915- Critical PTT called to floor greater than 180. Heparin gtt stopped per protocol. Night nurse to further address.

## 2019-06-14 NOTE — PROGRESS NOTES
Plans for hospice noted. We will sign off and be available for questions or assistance as necessary.

## 2019-06-14 NOTE — PROGRESS NOTES
Speech Therapy Note: 
 
Pt and family choosing hospice care upon d/c. Refused PT/OT/ST. Will d/c pt from [address] [place] [address] accordingly. Safest least restrictive diet at this time: puree/thin clear liquids. Thank you for this referral. 
 
Eliana Nelson. Cedric Busch M.S., CCC-SLP Clinical Supervisor - Med-surg Rehab Ph: [phone]

## 2019-06-14 NOTE — PROGRESS NOTES
1008:  Administered due medications through NG tube. Patient with a weak cough, suctioned patient and green thick sputum produced with yanker. Call placed to RT, RT at bedside providing patient with CPT.

## 2019-06-14 NOTE — PROGRESS NOTES
Problem: Falls - Risk of 
Goal: *Absence of Falls Description Document Tamarndmaru Carmichael Fall Risk and appropriate interventions in the flowsheet. Outcome: Progressing Towards Goal 
  
Problem: Pain Goal: *Control of Pain Outcome: Progressing Towards Goal

## 2019-06-14 NOTE — PROGRESS NOTES
RENAL PROGRESS NOTE Jennifer Cortez Assessment/Plan: · EMMETT: nonoliguric. Resolved. Will sign off. · Diabetic kidney disease/proteinuria. · Hypokalemia. Replace K/Mg as needed. · HFpEF/volume overload: much improved after Lasix drip. Oral Bumex. · BL le cellulitis/sepsis in a setting of pad/chronic venous insufficiency. Finished abx. · HTN.  Increase amlodipine. · Hypoglycemia with altered ms on admission. Improved. · Anemia. Mgmt per primary team. Transfused. Subjective:Patient complaints of: No complaints. No SOB/CP/N/V. Patient Active Problem List  
Diagnosis Code  Edema R60.9  Cough R05  Hypercholesterolemia E78.00  Fibromyalgia M79.7  hypothyroid E07.9  Dermatitis L30.9  Essential hypertension I10  Type 2 diabetes mellitus with mild nonproliferative diabetic retinopathy without macular edema ([facility] Q88.1785  Type 2 diabetes with nephropathy ([facility]) E11.21  
 Obesity E66.9  Hypoglycemia E16.2  PAD (peripheral artery disease) ([facility]) I73.9  Sepsis ([facility] A41.9  Acute respiratory failure ([facility]) J96.00  
 Cellulitis L03.90  Acute on chronic combined systolic and diastolic heart failure ([facility]) I50.43  Anemia D64.9  Acute encephalopathy G93.40  Metabolic alkalosis U42.8 Current Facility-Administered Medications Medication Dose Route Frequency Provider Last Rate Last Dose  dicyclomine (BENTYL) capsule 10 mg  10 mg Oral TID Eileen Rosenthal MD   10 mg at 06/14/19 0230  
 metoprolol tartrate (LOPRESSOR) tablet 25 mg  25 mg Oral BID Kandice Shell PA-C   25 mg at 06/13/19 2352  insulin glargine (LANTUS) injection 9 Units  9 Units SubCUTAneous DAILY Lorena Donahue H, DO      
 amLODIPine (NORVASC) tablet 10 mg  10 mg Oral DAILY Marion Mcqueen MD   10 mg at 06/13/19 0029  bumetanide (BUMEX) tablet 1 mg  1 mg Oral BID Eve Harris MD   1 mg at 06/13/19 1733  
 insulin lispro (HUMALOG) injection   SubCUTAneous Q6H Monica DELANEY DO   3 Units at 06/14/19 7292  potassium chloride (KLOR-CON) packet for solution 40 mEq  40 mEq Oral BID WITH MEALS OgboRaffy willsonka I, NP   40 mEq at 06/13/19 1733  
 metoprolol (LOPRESSOR) injection 5 mg  5 mg IntraVENous Q6H PRN Ogbolu Caitlyn I, NP      
 pantoprazole (PROTONIX) 40 mg in sodium chloride 0.9% 10 mL injection  40 mg IntraVENous DAILY Félix Lobo MD   40 mg at 06/13/19 2058  polyethylene glycol (MIRALAX) packet 17 g  17 g Oral DAILY Fabio Ortiz[address]   17 g at 06/13/19 1903  nystatin (MYCOSTATIN) 100,000 unit/mL oral suspension 500,000 Units  500,000 Units Oral QID NESHA Ortiz   500,000 Units at 06/13/19 2348  hydrALAZINE (APRESOLINE) 20 mg/mL injection 20 mg  20 mg IntraVENous Q4H PRN Niyah Rao MD   20 mg at 06/12/19 1223  dextrose 10% infusion 125-150 mL  125-150 mL IntraVENous PRN Niyah Rao  mL/hr at 06/08/19 1907 250 mL at 06/08/19 1907  acetaminophen (TYLENOL) tablet 650 mg  650 mg Oral Q4H PRN NESHA Ortiz   650 mg at 06/06/19 1319  
 sodium chloride (NS) flush 5-10 mL  5-10 mL IntraVENous PRN Snehal Haile MD   10 mL at 06/04/19 2206  heparin 25,000 units in D5W 250 ml infusion  18-36 Units/kg/hr IntraVENous TITRATE Snehal Haile MD 11.3 mL/hr at 06/14/19 0757 10 Units/kg/hr at 06/14/19 0757  levothyroxine (SYNTHROID) tablet 112 mcg  112 mcg Oral ACB Snehal Haile MD   112 mcg at 06/14/19 0615  
 glucose chewable tablet 16 g  4 Tab Oral PRN Kwakuboanamika Caitlyn I, NP   16 g at 06/08/19 1711  
 glucagon (GLUCAGEN) injection 1 mg  1 mg IntraMUSCular PRN Caitlyn Chaudhari, ALVIN      
 
 
Objective Vitals:  
 06/14/19 8783 06/14/19 6505 06/14/19 1022 06/14/19 0810 BP:   142/90 Pulse:   83 77 Resp:   20 20  
 Temp:   99.6 °F (37.6 °C) 98 °F (36.7 °C) SpO2: 96% 94% 95% 99% Weight:      
Height:      
 
 
 
Intake/Output Summary (Last 24 hours) at 6/14/2019 4332 Last data filed at 6/14/2019 0400 Gross per 24 hour Intake 1285 ml Output 1250 ml Net 35 ml Admission weight: Weight: 113.4 kg (250 lb) (06/03/19 0140) Last Weight Metrics: 
Weight Loss Metrics 6/13/2019 5/9/2019 9/20/2018 3/23/2018 8/4/2017 2/23/2017 10/6/2016 Today's Wt 241 lb 1.6 oz 235 lb 207 lb 3.2 oz 209 lb 3.2 oz 195 lb 198 lb 4.8 oz 198 lb BMI 40.12 kg/m2 39.11 kg/m2 34.48 kg/m2 34.81 kg/m2 32.45 kg/m2 33 kg/m2 32.95 kg/m2 Physical Assessment:  
 
General: NAD, alert and oriented. Neck: No jvd. LUNGS: Clear to Auscultation, No rales, rhonchi or wheezes. CVS EXM: S1, S2  RRR, no murmurs/gallops/rubs. Abdomen: soft, non tender. Lower Extremities:  no edema. Lab CBC w/Diff Recent Labs  
  06/14/19 
0610 06/13/19 
0403 06/12/19 
0545 WBC 21.0* 19.6* 20.8*  
RBC 2.41* 2.56* 2.20* HGB 7.0* 7.4* 6.2* HCT 22.3* 22.9* 18.9*  
 194 205 GRANS 81* 82* 82* LYMPH 6* 7* 8*  
EOS 2 2 2 Chemistry Recent Labs  
  06/14/19 
0610 06/13/19 
0403 06/12/19 
0545 * 224* 173*  137 137  
K 4.1 4.2 4.3 CL 99* 98* 102 CO2 36* 33* 29 BUN 46* 48* 50* CREA 1.19 1.46* 1.81* CA 9.1 8.4* 8.5 AGAP 3 6 6 BUCR 39* 33* 28* ALB 2.8* 2.8* 2.9*  
PHOS 3.4 3.1 3.5 No results found for: IRON, FE, TIBC, IBCT, PSAT, FERR Lab Results Component Value Date/Time Calcium 9.1 06/14/2019 06:10 AM  
 Phosphorus 3.4 06/14/2019 06:10 AM  
  
 
Radha Jacinto MD 
[facility] Pager: [phone]

## 2019-06-14 NOTE — PROGRESS NOTES
PT tx session attempted. Patient declining PT;  patient requesting to discontinue PT services at this time. Patient expressing that she no longer wants to participate with PT. Will d/c orders at this time. Thank you for this referral, Sam Mclaughlin PT, DPT Office extension: [phone]

## 2019-06-14 NOTE — PROGRESS NOTES
Problem: Discharge Planning Goal: *Discharge to safe environment Outcome: Progressing Towards Goal 
 
Possible Hospice Patient has NGT and per hospice rep she is to have another conversation with the son regarding his acceptance of hospice. Awaiting final decision per hospice liaison. Per son there is construction issues at the home in front the house which may pose challenges getting equipment in to the home. Have left message for son to return call. 
  
 
Spoke with son and he stated that he is agreeable to Hospice and that the Hospice liaison would work to get the equipment the patient would need on Monday. He stated that between him and his son they would care for the patient  in the home.

## 2019-06-15 NOTE — PROGRESS NOTES
Shift Summary Note: Assumed care of patient in bed awake and alert at times. Placed on Bipap @ night. Tolerated satisfactorily. Tolerating feeds via NGT. Uneventful night call bell within reach. Patient Vitals for the past 12 hrs: 
 Temp Pulse Resp BP SpO2  
06/15/19 0519 98.6 °F (37 °C) 86 18 158/51 93 % 06/15/19 0044 99.5 °F (37.5 °C) (!) 103 20 158/64 90 % 06/14/19 2052 99.2 °F (37.3 °C) 99 20 168/68 96 % 06/14/19 1906 97 °F (36.1 °C) 93 16 130/60 95 % 06/14/19 1903     98 %

## 2019-06-15 NOTE — PROGRESS NOTES
Problem: Gas Exchange - Impaired Goal: *Absence of hypoxia Outcome: Progressing Towards Goal 
 Respiratory Therapy Assessment Care Plan Patient: 
Agustina Maldonado [de-identified] y.o. female 6/15/2019 9:34 AM 
 
Hypoglycemia [E16.2] PAD (peripheral artery disease) ([facility] [I73.9] Diabetes ([facility] [E11.9] Chest X-RAY:  
Results from Hospital Encounter encounter on 06/03/19 XR CHEST PORT Impression IMPRESSION: 
 
 
1. Right IJ central venous catheter and visualized nasogastric tube as above. 2. Small left pleural effusion with left retrocardiac atelectasis or airspace 
disease, unchanged. XR ABD (KUB) Impression IMPRESSION: 
 
Nasogastric tube tip at the expected position of the distal stomach. XR ABD (KUB) Impression IMPRESSION: 
 
Nasogastric tube tip at the EG junction. Telephone report was called to ICU 
staff at 8:45 AM on 6/8/2019. Vital Signs:   Visit Vitals /63 Pulse 90 Temp 99.3 °F (37.4 °C) Resp 18 Ht 5' 5\" (1.651 m) Wt 105 kg (231 lb 8 oz) SpO2 96% BMI 38.52 kg/m² Indications for treatment: Hypercapnia and CHF, per CXR, left basilar lung opacity (atelectasis/aspiration/infection/pleural effusion) Plan of care: BIPAP QHS, O2 therapy, Vest therapy Q4 Goal: Oxygenation, airway clearance

## 2019-06-15 NOTE — PROGRESS NOTES
06/15/19 0015 CPAP/BIPAP  
CPAP/BIPAP Start/Stop On Device Mode BIPAP  
$$ Bipap Daily Yes Mask Type and Size Full face IPAP (cm H2O) 16 cm H2O  
EPAP (cm H2O) 8 cm H2O Pt's Home Machine No  
Biomedical Check Performed Yes Settings Verified Yes Patient placed on CPAP machine for the night with 3 liters of oxygen.

## 2019-06-15 NOTE — PROGRESS NOTES
Internal Medicine Progress Note Patient's Name: Nara Castro Admit Date: 6/3/2019 Length of Stay: 12 
 
 
Assessment/Plan Principal Problem: 
  Sepsis ([facility] [address] (6/3/2019) Active Problems: 
  Acute respiratory failure ([address] (6/3/2019) Type 2 diabetes with nephropathy ([facility] (3/23/2018) PAD (peripheral artery disease) ([facility] (6/3/2019) Acute on chronic combined systolic and diastolic heart failure ([facility] [facility] (6/4/2019) 
 
  hypothyroid () Essential hypertension (9/3/2015) Obesity (5/9/2019) Hypoglycemia (6/3/2019) Cellulitis (6/3/2019) Anemia (6/8/2019) Acute encephalopathy (6/9/2019) Metabolic alkalosis (5/83/4785) - comfort measures only 
- PRN morphine for pain/SOB 
- PRN intensol 
- PRN pericolace 
- PRN scopolamine - wound care as needed (per son) 
- no further blood draws I have personally reviewed all pertinent labs and films that have officially resulted over the last 24 hours. I have personally checked for all pending labs that are awaiting final results. Interval History Per H&P, \"Nette Lama is a [de-identified] y.o. female who was found down by her son. Cliff Alonso had weakness and altered mental status.  She also had SOB.  She has a known history of Diabetes.  In the ER she was also found to have hypoglycemia. Cliff Alonso is noted to have decreased pulses bilaterally.  She also was found to have significant respiratory distress and was started on BiPap.  Vascular surgery was called because of decreased perfusion to both feet.  She has severe erythema involving both feet as well as her abdomen and below her breast. Arnol Drilling is significant concern for sepsis along with her acute respiratory failure.  She is admitted for ongoing management. \" 
  
Hypoglycemia initially managed with D10 bolus and D20 infusion. Vascular surgery recommended heparin gtt and imaging.  BLE duplex - b/l PAD, mod stenosis in R CF artery, absent R PT artery consistent with occlusion, absent L distal pop/PT/AT arteries consistent with occlusion. 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 No emergent surgical intervention per VS until patient stabilizes. Wound Care consulted. Patient also found to be in heart failure, bumex started. BCx 6/3 positive for coag neg staph in 1/2 bottles. Repeat BCx done 6/6. Creatinine noted to be rising gradually. PT/OT recommending SNF. ST recs puree/nectar thick liquids. Transferred out of ICU 6/10. Per EMR/hospice RN, POA agreeable to hospice on 6/14. Comfort care measures in place. Subjective Pt s/e @ bedside. No major events overnight. Patient has no complaints this morning. Objective Visit Vitals /63 Pulse 90 Temp 99.3 °F (37.4 °C) Resp 18 Ht 5' 5\" (1.651 m) Wt 105 kg (231 lb 8 oz) SpO2 94% BMI 38.52 kg/m² Physical Exam: 
General Appearance: NAD, minimally conversant, obese HENT: normocephalic/atraumatic, moist mucus membranes Neck: No JVD, supple Lungs: CTA with normal respiratory effort CV: RRR, no m/r/g Abdomen: soft, non-tender, active bowel sounds Extremities: no cyanosis Neuro: no new focal deficits Intake and Output: 
Current Shift:  No intake/output data recorded. Last three shifts:  06/13 1901 - 06/15 0700 In: 2190 Out: 1152 [[id number]] Lab/Data Reviewed: All lab results for the last 24 hours reviewed. Imaging Reviewed: 
No results found. Medications Reviewed: 
Current Facility-Administered Medications Medication Dose Route Frequency  LORazepam (INTENSOL) 2 mg/mL oral concentrate 1 mg  1 mg Oral Q1H PRN  
 scopolamine (TRANSDERM-SCOP) 1 mg over 3 days 1 Patch  1 Patch TransDERmal Q72H PRN  
 senna-docusate (PERICOLACE) 8.6-50 mg per tablet 2 Tab  2 Tab Oral BID PRN  
 morphine injection 10 mg  10 mg Inhalation Q1H PRN Quang Mcclellan PA-C [address] [facility] Hospitalist Division Office:  [phone] Pager: [phone]

## 2019-06-16 NOTE — PROGRESS NOTES
0830 Received report from KVNG Lopez and patient was somnelent and oriented to self and sitting in bed. Most frequently used items within reach bed in lowest position call light in place side rails up. 1110 pt resting comfortably, gave pt medications assessed for pain pt could not verbalize 1200 performed oral care Pt son had concerns on plan of care asked NESHA Finley for  Comfort care measures to be established while patient is still inpatient. 1331 removed NG tube pt intake was 83 ml. Removed heparin drip and discontinued infusions. 1600 pt. Was sleepy and easily aroused to sound of her name 1800 pt awake but looking out of window denies pain and facial expression showed no grimace.

## 2019-06-16 NOTE — PROGRESS NOTES
Internal Medicine Progress Note Patient's Name: Moraima Briggs Admit Date: 6/3/2019 Length of Stay: 13 
 
 
Assessment/Plan Principal Problem: 
  Sepsis ([facility] [address] (6/3/2019) Active Problems: 
  Acute respiratory failure ([address] (6/3/2019) Type 2 diabetes with nephropathy ([facility] [address] (3/23/2018) PAD (peripheral artery disease) ([facility] [address] (6/3/2019) Acute on chronic combined systolic and diastolic heart failure ([facility] [address] (6/4/2019) 
 
  hypothyroid () Essential hypertension (9/3/2015) Obesity (5/9/2019) Hypoglycemia (6/3/2019) Cellulitis (6/3/2019) Anemia (6/8/2019) Acute encephalopathy (6/9/2019) Metabolic alkalosis (6/50/6595) 
 
- d/c home with hospice tomorrow - comfort measures only 
- PRN morphine for pain/SOB 
- PRN intensol 
- PRN pericolace 
- PRN scopolamine - wound care as needed (per son) 
- no further blood draws I have personally reviewed all pertinent labs and films that have officially resulted over the last 24 hours. I have personally checked for all pending labs that are awaiting final results. Interval History Per H&P, \"Nette Lama is a [de-identified] y.o. female who was found down by her son. Kaylee Landers had weakness and altered mental status.  She also had SOB.  She has a known history of Diabetes.  In the ER she was also found to have hypoglycemia. Kaylee Landers is noted to have decreased pulses bilaterally.  She also was found to have significant respiratory distress and was started on BiPap.  Vascular surgery was called because of decreased perfusion to both feet.  She has severe erythema involving both feet as well as her abdomen and below her breast. Patricia Pica is significant concern for sepsis along with her acute respiratory failure.  She is admitted for ongoing management. \" 
  
Hypoglycemia initially managed with D10 bolus and D20 infusion. Vascular surgery recommended heparin gtt and imaging.  BLE duplex - b/l PAD, mod stenosis in R CF artery, absent R PT artery consistent with occlusion, absent L distal pop/PT/AT arteries consistent with occlusion. 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 No emergent surgical intervention per VS until patient stabilizes. Wound Care consulted. Patient also found to be in heart failure, bumex started. BCx 6/3 positive for coag neg staph in 1/2 bottles. Repeat BCx done 6/6. Creatinine noted to be rising gradually. PT/OT recommending SNF. ST recs puree/nectar thick liquids. Transferred out of ICU 6/10. Per EMR/hospice RN, POA agreeable to hospice on 6/14. Comfort care measures in place. Subjective Pt s/e @ bedside. No major events overnight. Patient has no complaints this morning. Plan to d/c home with hospice tomorrow. Objective Visit Vitals /60 (BP 1 Location: Left arm, BP Patient Position: At rest) Pulse 92 Temp 99.7 °F (37.6 °C) Resp 17 Ht 5' 5\" (1.651 m) Wt 105 kg (231 lb 8 oz) SpO2 100% BMI 38.52 kg/m² Physical Exam: 
General Appearance: NAD, alert HENT: normocephalic/atraumatic, dry mucus membranes Lungs: CTA with normal respiratory effort CV: RRR, no m/r/g Extremities: no cyanosis Intake and Output: 
Current Shift:  06/16 0701 - 06/16 1900 In: 240 [P.O.:240] Out: - Last three shifts:  06/14 1901 - 06/16 0700 In: 698.6 [I.V.:8.6] Out: 450 [Urine:450] Lab/Data Reviewed: All lab results for the last 24 hours reviewed. Imaging Reviewed: 
No results found. Medications Reviewed: 
Current Facility-Administered Medications Medication Dose Route Frequency  LORazepam (INTENSOL) 2 mg/mL oral concentrate 1 mg  1 mg Oral Q1H PRN  
 scopolamine (TRANSDERM-SCOP) 1 mg over 3 days 1 Patch  1 Patch TransDERmal Q72H PRN  
 senna-docusate (PERICOLACE) 8.6-50 mg per tablet 2 Tab  2 Tab Oral BID PRN  
 morphine injection 10 mg  10 mg Inhalation Q1H PRN Keyanna Lombardi PA-C [address] [place] [address] Hospitalist Division Office:  [phone] Pager: [phone]

## 2019-06-16 NOTE — PROGRESS NOTES
Problem: Falls - Risk of 
Goal: *Absence of Falls Description Document Afua Carmichael Fall Risk and appropriate interventions in the flowsheet. Outcome: Progressing Towards Goal 
  
Problem: Pressure Injury - Risk of 
Goal: *Prevention of pressure injury Description Document Tate Scale and appropriate interventions in the flowsheet. Outcome: Progressing Towards Goal 
  
Problem: Hypertension Goal: *Blood pressure within specified parameters Outcome: Progressing Towards Goal 
Goal: *Fluid volume balance Outcome: Progressing Towards Goal 
Goal: *Labs within defined limits Outcome: Progressing Towards Goal 
  
Problem: Diabetes Self-Management Goal: *Disease process and treatment process Description Define diabetes and identify own type of diabetes; list 3 options for treating diabetes. Outcome: Progressing Towards Goal 
Goal: *Incorporating nutritional management into lifestyle Description Describe effect of type, amount and timing of food on blood glucose; list 3 methods for planning meals. Outcome: Progressing Towards Goal 
Goal: *Incorporating physical activity into lifestyle Description State effect of exercise on blood glucose levels. Outcome: Progressing Towards Goal 
Goal: *Developing strategies to promote health/change behavior Description Define the ABC's of diabetes; identify appropriate screenings, schedule and personal plan for screenings. Outcome: Progressing Towards Goal 
Goal: *Using medications safely Description State effect of diabetes medications on diabetes; name diabetes medication taking, action and side effects. Outcome: Progressing Towards Goal 
Goal: *Monitoring blood glucose, interpreting and using results Description Identify recommended blood glucose targets  and personal targets. Outcome: Progressing Towards Goal 
Goal: *Prevention, detection, treatment of acute complications Description List symptoms of hyper- and hypoglycemia; describe how to treat low blood sugar and actions for lowering  high blood glucose level. Outcome: Progressing Towards Goal 
Goal: *Prevention, detection and treatment of chronic complications Description Define the natural course of diabetes and describe the relationship of blood glucose levels to long term complications of diabetes. Outcome: Progressing Towards Goal 
Goal: *Developing strategies to address psychosocial issues Description Describe feelings about living with diabetes; identify support needed and support network Outcome: Progressing Towards Goal 
Goal: *Sick day guidelines Outcome: Progressing Towards Goal 
Goal: *Patient Specific Goal (EDIT GOAL, INSERT TEXT) Outcome: Progressing Towards Goal 
  
Problem: Pain Goal: *Control of Pain Outcome: Progressing Towards Goal 
Goal: *PALLIATIVE CARE:  Alleviation of Pain Outcome: Progressing Towards Goal 
  
Problem: Tissue Perfusion - Cardiopulmonary, Altered Goal: *Optimize tissue perfusion Outcome: Progressing Towards Goal 
Goal: *Absence of hypoxia Outcome: Progressing Towards Goal 
  
Problem: Fluid Volume - Risk of, Imbalanced Goal: *Balanced intake and output Outcome: Progressing Towards Goal 
  
Problem: Patient Education: Go to Patient Education Activity Goal: Patient/Family Education Outcome: Progressing Towards Goal 
  
Problem: Discharge Planning Goal: *Discharge to safe environment Outcome: Progressing Towards Goal 
  
Problem: Gas Exchange - Impaired Goal: *Absence of hypoxia Outcome: Progressing Towards Goal 
  
Problem: Nutrition Deficit Goal: *Optimize nutritional status Outcome: Progressing Towards Goal 
  
Problem: General Wound Care Goal: *Non-infected wound: Improvement of existing wound, absence of infection, and maintenance of skin integrity Outcome: Progressing Towards Goal 
Goal: *Infected Wound: Prevention of further infection and promotion of healing Description Infection control procedures (eg: clean dressings, clean gloves, hand washing, precautions to isolate wound from contamination, sterile instruments used for wound debridement) should be implemented. Outcome: Progressing Towards Goal 
Goal: Interventions Outcome: Progressing Towards Goal 
  
Problem: Patient Education: Go to Patient Education Activity Goal: Patient/Family Education Outcome: Progressing Towards Goal 
  
Problem: Patient Education: Go to Patient Education Activity Goal: Patient/Family Education Outcome: Progressing Towards Goal 
  
Problem: Sepsis: Day 3 Goal: Treatments/Interventions/Procedures Outcome: Progressing Towards Goal 
  
Problem: Sepsis: Day 5 Goal: Diagnostic Test/Procedures Outcome: Progressing Towards Goal 
Goal: Nutrition/Diet Outcome: Progressing Towards Goal 
Goal: Discharge Planning Outcome: Progressing Towards Goal 
Goal: Medications Outcome: Progressing Towards Goal 
  
Problem: Sepsis: Discharge Outcomes Goal: *Vital signs within defined limits Outcome: Progressing Towards Goal 
Goal: *Tolerating diet Outcome: Progressing Towards Goal 
Goal: *Verbalizes understanding and describes prescribed diet Outcome: Progressing Towards Goal 
Goal: *Demonstrates progressive activity Outcome: Progressing Towards Goal 
Goal: *Describes follow-up/return visits to physicians Outcome: Progressing Towards Goal 
Goal: *Verbalizes name, dosage, time, side effects, and number of days to continue medications Outcome: Progressing Towards Goal 
Goal: *Influenza immunization (Oct-Mar only) Outcome: Progressing Towards Goal 
Goal: *Pneumococcal immunization Outcome: Progressing Towards Goal 
Goal: *Lungs clear or at baseline Outcome: Progressing Towards Goal 
Goal: *Oxygen saturation returns to baseline or 90% or better on room air Outcome: Progressing Towards Goal 
Goal: *Glycemic control Outcome: Progressing Towards Goal 
Goal: *Absence of deep venous thrombosis signs and symptoms(Stroke Metric) Outcome: Progressing Towards Goal 
 Goal: *Describes available resources and support systems Outcome: Progressing Towards Goal 
Goal: *Optimal pain control at patient's stated goal 
Outcome: Progressing Towards Goal 
  
Problem: Discharge Planning Goal: *Discharge to safe environment Outcome: Progressing Towards Goal 
  
Problem: Gas Exchange - Impaired Goal: *Absence of hypoxia Outcome: Progressing Towards Goal 
  
Problem: Patient Education: Go to Patient Education Activity Goal: Patient/Family Education Outcome: Progressing Towards Goal 
  
Problem: Gas Exchange - Impaired Goal: *Absence of hypoxia Outcome: Progressing Towards Goal 
  
Problem: Patient Education: Go to Patient Education Activity Goal: Patient/Family Education Outcome: Progressing Towards Goal 
  
Problem: Gas Exchange - Impaired Goal: *Absence of hypoxia Outcome: Progressing Towards Goal 
  
Problem: Patient Education: Go to Patient Education Activity Goal: Patient/Family Education Outcome: Progressing Towards Goal

## 2019-06-17 PROBLEM — R13.10 DYSPHAGIA: Status: ACTIVE | Noted: 2019-01-01

## 2019-06-17 PROBLEM — R78.81 BACTEREMIA: Status: ACTIVE | Noted: 2019-01-01

## 2019-06-17 NOTE — PROGRESS NOTES
completed a follow up visit with patient in room  1190 [id number] as she is now on hospice and may be going home this afternoon. Patient remains unresponsive at present. Chaplains will continue to follow and will provide pastoral care on an as needed/requested basis Reiseñor 3 Board Certified [address] [place] [address] Care  
[phone]

## 2019-06-17 NOTE — ROUTINE PROCESS
2000 Bedside and Verbal shift change report given to 1540 Destini Gonzalez (oncoming nurse) by me (offgoing nurse). Report included the following information SBAR, Intake/Output, MAR and Recent Results. Patient on comfort care.

## 2019-06-17 NOTE — PROGRESS NOTES
0800-Received pt resting in bed with eyes closed, comfort measures only, pt appears comfortable, no sign of distress, will continue to monitor 1000-Pt appears comfortable, resting with eyes closed

## 2019-06-17 NOTE — HOSPICE
Spoke to son on phone, he is concerned that his road may not be accessible still. He is also concerned with transport being able to bring patient as he states there is a 6-7 inch drop off and \"they have not gotten to the end of my street yet. \"  Reassured him that patient transport will not be arranged until there is confirmation that all equipment is in the home and is functioning correctly. He informed this nurse that the DME company is scheduled to arrive between [id number], Jocelyn Claros will call this nurse back and inform her whether or not deliver was success.

## 2019-06-17 NOTE — PROGRESS NOTES
Clarification:  Patient did not have sepsis on presentation. She had Bacteremia without evidence of Sepsis.

## 2019-06-17 NOTE — DISCHARGE SUMMARY
[address] [place], [place] [address]ty Group Hospitalist Division Discharge SummaryPatient: Radha Rosenthal MRN: [id number]  CSN: [id number] YOB: 1940  Age: [de-identified] y.o. Sex: female DOA: 6/3/2019 LOS:  LOS: 14 days   Discharge Date:   
 
Admission Diagnoses: Hypoglycemia [E16.2] PAD (peripheral artery disease) ([facility] [I73.9] Diabetes ([facility] [E11.9] Discharge Diagnoses:   
Problem List as of 6/17/2019 Date Reviewed: 5/9/2019 Codes Class Noted - Resolved Sepsis ([facility] ICD-10-CM: A41.9 ICD-9-CM: 038.9, 995.91  6/3/2019 - Present Acute respiratory failure ([facility] ICD-10-CM: J96.00 
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 
ICD-9-CM: 250.40, 583.81  3/23/2018 - Present PAD (peripheral artery disease) (HCC) ICD-10-CM: I73.9 ICD-9-CM: 443.9  6/3/2019 - Present Acute on chronic combined systolic and diastolic heart failure (HCC) ICD-10-CM: I50.43 ICD-9-CM: 428.43  6/4/2019 - Present Bacteremia ICD-10-CM: R78.81 ICD-9-CM: 790.7  6/17/2019 - Present Metabolic alkalosis JILL-11-YC: E87.3 ICD-9-CM: 276.3  6/13/2019 - Present Acute encephalopathy ICD-10-CM: G93.40 ICD-9-CM: 348.30  6/9/2019 - Present Anemia ICD-10-CM: D64.9 ICD-9-CM: 285.9  6/8/2019 - Present Hypoglycemia ICD-10-CM: E16.2 ICD-9-CM: 251.2  6/3/2019 - Present Cellulitis ICD-10-CM: L03.90 ICD-9-CM: 682.9  6/3/2019 - Present Obesity ICD-10-CM: E66.9 ICD-9-CM: 278.00  5/9/2019 - Present Essential hypertension (Chronic) ICD-10-CM: I10 
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  
   
 hypothyroid (Chronic) ICD-10-CM: E07.9 ICD-9-CM: 246. 9  Unknown - Present RESOLVED: Diabetes mellitus due to underlying condition with complication, without long-term current use of insulin (HCC) (Chronic) ICD-10-CM: C70.2 ICD-9-CM: 249.90  10/6/2016 - 3/23/2018 RESOLVED: Diabetes mellitus (HCC) (Chronic) ICD-10-CM: E11.9 ICD-9-CM: 250.00  3/15/2012 - 9/3/2015 RESOLVED: Hypertension (Chronic) ICD-10-CM: I10 
ICD-9-CM: 401.9  Unknown - 9/3/2015 Discharge Condition: Stable Discharge To: Home with Hospice Consults: Nephrology, Pulmonary/Critical Care and Vascular Surgery Hospital Course: Per H&P, \"Nette Lama is a [de-identified] y.o. female who was found down by her son. Hosey Carrel had weakness and altered mental status.  She also had SOB.  She has a known history of Diabetes.  In the ER she was also found to have hypoglycemia. Hosey Carrel is noted to have decreased pulses bilaterally.  She also was found to have significant respiratory distress and was started on BiPap.  Vascular surgery was called because of decreased perfusion to both feet.  She has severe erythema involving both feet as well as her abdomen and below her breast. Juan Bailey is significant concern for sepsis along with her acute respiratory failure.  She is admitted for ongoing management. \" 
  
Hypoglycemia initially managed with D10 bolus and D20 infusion. Vascular surgery recommended heparin gtt and imaging. BLE duplex - b/l PAD, mod stenosis in R CF artery, absent R PT artery consistent with occlusion, absent L distal pop/PT/AT arteries consistent with occlusion.  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 No emergent surgical intervention per VS until patient stabilizes. Wound Care consulted. Patient also found to be in heart failure, bumex started. BCx 6/3 positive for coag neg staph in 1/2 bottles. Repeat BCx done 6/6. Creatinine noted to be rising gradually. PT/OT recommending SNF, patient requiring max assist. Transferred out of ICU 6/10. SLP recommending NPO. NGT placed for TF. Patient did not want PEG. Per EMR/hospice RN, POA agreeable to hospice on 6/14. Comfort care measures in place. Patient to discharge home with hospice on 6/17 after home equipment delivered and functioning. Physical Exam: 
General Appearance: NAD, alert HENT: normocephalic/atraumatic, dry mucus membranes Lungs: CTA with normal respiratory effort CV: RRR, no m/r/g Significant Diagnostic Studies: All lab results for the last 24 hours reviewed. Xr Femur Rt 2 Vs 
 
Result Date: 6/3/2019 EXAM: XR FEMUR RT 2 VS CLINICAL INDICATION/HISTORY: trauma   > Additional: Patient found down, right leg pain COMPARISON: None. TECHNIQUE: AP and lateral views of the right femur obtained on a total of 5 exposures. _______________ FINDINGS: Osseous alignment is as expected on the provided projections. No fracture demonstrated. Degenerative changes of both the right hip and right knee are present, most advanced across the tibiofemoral articulations. No retained radiopaque foreign object. Bladder catheter noted to project over the lower pelvic midline. _______________ IMPRESSION: Degenerative changes involving the right femur as described without radiographic evidence of acute abnormality. Xr Abd (kub) Result Date: 6/8/2019 EXAM: Portable supine abdomen, one view INDICATION: Nasogastric tube placement COMPARISON: 1/7/2019 _______________ FINDINGS: Nasogastric tube has been advanced with its tip now projecting at the expected position of the distal stomach. No other changes. _______________ IMPRESSION: Nasogastric tube tip at the expected position of the distal stomach. Xr Abd (kub) Result Date: 6/8/2019 EXAM: Portable semiupright abdomen, one view INDICATION: NG tube placement COMPARISON: 6/7/2019 _______________ FINDINGS: Nasogastric tube slightly difficult to visualize due to underpenetration. Tip is visible to about the level of the EG junction and not definitively below the diaphragm. ET tube no longer appears to be reflected back upon itself. No other changes. _______________ IMPRESSION: Nasogastric tube tip at the EG junction. Telephone report was called to ICU staff at 8:45 AM on 6/8/2019. Xr Swallow Func Video Result Date: 6/7/2019 Barium speech study History: Dysphagia, cough. Barium pharyngogram performed under the guidance of speech therapist, with fluoroscopic guidance by Denise Cunha. Thin barium via cup, barium nectar via cup, and semisolid barium pudding administered. Fluoro Dose (reference air kerma): 15.17 mGy. Findings: Thin barium: Swallowing delay with premature spillage. Aspiration with cough reflex noted. There is residual in the vallecula and pyriform sinus. Nectar: Oral and swallowing delay with premature spillage. No laryngeal penetration. Oral residuals along with residual in the vallecula and pyriform sinus. Pudding: Oral and swallowing delay with premature spillage. No laryngeal penetration. Oral residuals along with residual in the vallecula and pyriform sinus Suctioning of the oral residuals from the nectar and pudding performed. Impression: Abnormal examination as detailed above. Please refer to the full report from speech pathologist submitted separately. Ct Head Wo Cont Result Date: 6/8/2019 EXAM: CT head INDICATION: Decreased alertness COMPARISON: 10/16/2012 TECHNIQUE: Axial scanning was performed through the head without intravenous contrast.  Sagittal and coronal reconstructions were created from the axial data. One or more dose reduction techniques were used on this CT: automated exposure control, adjustment of the mAs and/or kVp according to patient size, and iterative reconstruction techniques. The specific techniques used on this CT exam have been documented in the patient's electronic medical record. Digital Imaging and Communications in Medicine (DICOM) format image data are available to nonaffiliated external healthcare facilities or entities on a secure, media free, reciprocally searchable basis with patient authorization for at least a 12-month period after this study. _______________ FINDINGS: BRAIN AND POSTERIOR FOSSA: Ventricles, cisterns and sulci are within normal range. No intracranial hemorrhage, mass effect, or midline shift. 2 adjacent focal low densities are present in the right corona radiata, the larger measuring 12 mm, not previously present, no mass effect. Gray-white differentiation is preserved. EXTRA-AXIAL SPACES AND MENINGES: No extracerebral collections. CALVARIUM: Intact. SINUSES: Visualized portions are clear. OTHER: None. _______________ IMPRESSION: Interval developing 2 adjacent nonspecific right corona radiata low densities. Differential considerations include microvascular ischemic white matter change, demyelination, and white matter infarct. Xr Pelv 1 Or 2 V Result Date: 6/3/2019 EXAM:  XR PELV 1 OR 2 V INDICATION:  Trauma COMPARISON: None. FINDINGS: An AP view of the pelvis was obtained portably. The patient is rotated on the provided projection with significant foreshortening of the left hemipelvis. No displaced fracture demonstrated. Bilateral hip joint osteoarthritis is present. Multilevel lumbar spondylosis and facet joint osteoarthritis. Radiopaque tubing from bladder catheter present at midline. IMPRESSION:  Rotated projection of the pelvis with degenerative changes as described. No superimposed acute radiographic abnormality. Xr Chest [facility] Result Date: 6/13/2019 EXAM: XR CHEST PORT CLINICAL INDICATION/HISTORY: Follow-up pulmonary infiltrates -Additional: None COMPARISON: Several prior chest radiographs, most recently June 7, 2019 TECHNIQUE: Portable frontal view of the chest _______________ FINDINGS: SUPPORT DEVICES: Right IJ central venous catheter in stable position. Nasogastric tube below the diaphragm, tip collimated from view. HEART AND MEDIASTINUM: Stable cardiac size and mediastinal contours. LUNGS AND PLEURAL SPACES: Similar degree of pulmonary inflation. Small left pleural effusion with left retrocardiac density demonstrated. Right lung mildly underexpanded but clear. No pneumothorax. BONY THORAX AND SOFT TISSUES: No acute osseous abnormality. Severe bilateral shoulder girdle osteoarthritis as previously. _______________ IMPRESSION: 1. Right IJ central venous catheter and visualized nasogastric tube as above. 2. Small left pleural effusion with left retrocardiac atelectasis or airspace disease, unchanged. Xr Chest [facility] Result Date: 6/7/2019 A portable AP radiograph of the chest and abdomen was obtained at 1708 hours: INDICATION:  NG tube placement. COMPARISON:  Chest dated 6/3/2019. FINDINGS: NG/OG tube extends down to the GE junction and coils back up into the upper thoracic esophagus. Removal and reinsertion suggested. ABDOMEN: Mildly prominent bowel loops are present Heart and mediastinum: Mildly enlarged cardiac silhouette. Right-sided IJ line tip is near the cavoatrial junction. Lungs and pleura: Consolidation left base obscures the hemidiaphragm consistent with pleural effusion and adjacent infiltrate/atelectasis. Right lung is clear. Aorta: Unremarkable. Bones: Degenerative changes are seen throughout the spine and in both shoulders. Other: None. Impression: NG/OG tube extends down to the GE junction and coils back up into the upper thoracic esophagus. Reinsertion suggested.  Consolidation left lung base obscuring the hemidiaphragm is likely due to pleural effusion and adjacent infiltrate/atelectasis. CRITICAL: Critical result given to nurse Lizette at [phone] hours by Dr. Martine Hinojosa. Xr Chest [facility] Addendum Date: 6/4/2019 Addendum: Note: The final attending interpretation is without significant discrepancy relative to preliminary resident report. Result Date: 6/4/2019 EXAM: Portable semiupright chest radiograph. INDICATION: \"New R IJ CVL. \" COMPARISON: [place] 3, 2019. _______________ FINDINGS:     DEVICES:  Right internal jugular line projects in standard position with tip projecting in the SVC. LUNGS:           --Expansion:  Adequate. --Consolidation:  Left basilar /retrocardiac consolidation. --Pulmonary edema:  None detected. --Other:  Non-applicable. PLEURAL SPACE:          --Pleural effusion:  Possible left pleural effusion. --Pneumothorax:  None detected. _______________ IMPRESSION: New right internal jugular line projects in standard position. No conspicuous pneumothorax. Unchanged left basilar lung opacity -- atelectasis, aspiration, infection, or a combination, with possible left basilar effusion. _______________ Xr Chest [facility] Result Date: 6/3/2019 EXAM: CHEST RADIOGRAPH, SINGLE VIEW CLINICAL INDICATION/HISTORY: Sepsis, fever COMPARISON: None. TECHNIQUE: Portable frontal view of the chest was obtained. _______________ FINDINGS: SUPPORT DEVICES: None. HEART AND MEDIASTINUM: Cardiomediastinal silhouette appears within normal limits. Normal caliber thoracic aorta. No central vascular congestion. LUNGS AND PLEURAL SPACES: Patchy opacity is seen throughout retrocardiac left lower lobe silhouetting adjacent left hemidiaphragm. Left mid and upper lung zones and right lung remain well aerated. No definite right pleural effusion. No pneumothorax. BONY THORAX AND SOFT TISSUES: No acute osseous abnormality. _______________ IMPRESSION: Small left pleural effusion and adjacent left basilar atelectasis/infiltrate. Xr Abd Port  1 V Result Date: 6/7/2019 A portable AP radiograph of the chest and abdomen was obtained at 1708 hours: INDICATION:  NG tube placement. COMPARISON:  Chest dated 6/3/2019. FINDINGS: NG/OG tube extends down to the GE junction and coils back up into the upper thoracic esophagus. Removal and reinsertion suggested. ABDOMEN: Mildly prominent bowel loops are present Heart and mediastinum: Mildly enlarged cardiac silhouette. Right-sided IJ line tip is near the cavoatrial junction. Lungs and pleura: Consolidation left base obscures the hemidiaphragm consistent with pleural effusion and adjacent infiltrate/atelectasis. Right lung is clear. Aorta: Unremarkable. Bones: Degenerative changes are seen throughout the spine and in both shoulders. Other: None. Impression: NG/OG tube extends down to the GE junction and coils back up into the upper thoracic esophagus. Reinsertion suggested. Consolidation left lung base obscuring the hemidiaphragm is likely due to pleural effusion and adjacent infiltrate/atelectasis. CRITICAL: Critical result given to nurse Lizette at [phone] hours by Dr. Olvin Mooney. Discharge Medications:    
Current Discharge Medication List  
  
START taking these medications Details LORazepam (INTENSOL) 2 mg/mL concentrated solution Take 0.5 mL by mouth every one (1) hour as needed for Agitation or Anxiety. Max Daily Amount: 24 mg. Qty: 30 mL, Refills: 0 Associated Diagnoses: Arterial occlusion, lower extremity (HCC)  
  
scopolamine (TRANSDERM-SCOP) 1 mg over 3 days pt3d 1 Patch by TransDERmal route every seventy-two (72) hours as needed for Other (SECRETIONS). Qty: 10 Patch, Refills: 0  
  
senna-docusate (PERICOLACE) 8.6-50 mg per tablet Take 2 Tabs by mouth two (2) times daily as needed for Constipation. Qty: 30 Tab, Refills: 0 morphine (ROXANOL) 100 mg/5 mL (20 mg/mL) concentrated solution Take 0.5 mL by mouth every one (1) hour as needed for Pain for up to 3 days. Max Daily Amount: 240 mg. 
Qty: 30 mL, Refills: 0 Associated Diagnoses: Arterial occlusion, lower extremity ([address] CONTINUE these medications which have NOT CHANGED Details  
furosemide (LASIX) 20 mg tablet Take one tablet by mouth daily as needed for swelling  Indications: visible water retention 
Qty: 30 Tab, Refills: 2 Associated Diagnoses: Pedal edema STOP taking these medications  
  
 omeprazole (PRILOSEC) 20 mg capsule Comments:  
Reason for Stopping:   
   
 colesevelam (WELCHOL) 625 mg tablet Comments:  
Reason for Stopping:   
   
 glipiZIDE SR (GLUCOTROL XL) 5 mg CR tablet Comments:  
Reason for Stopping:   
   
 metoprolol succinate (TOPROL-XL) 50 mg XL tablet Comments:  
Reason for Stopping:   
   
 levothyroxine (SYNTHROID) 112 mcg tablet Comments:  
Reason for Stopping:   
   
 ammonium lactate (AMLACTIN) 12 % topical cream Comments:  
Reason for Stopping: ESTRACE 0.01 % (0.1 mg/gram) vaginal cream Comments:  
Reason for Stopping: ORACEA 40 mg capsule Comments:  
Reason for Stopping:   
   
 MARYLOU ASPIRIN PO Comments:  
Reason for Stopping:   
   
 Ibuprofen-diphenhydrAMINE (ADVIL PM) 200-38 mg tab Comments:  
Reason for Stopping:   
   
 etodolac (LODINE) 400 mg tablet Comments:  
Reason for Stopping:   
   
 naproxen-diphenhydramine 220-25 mg tab Comments:  
Reason for Stopping:   
   
  
 
 
Activity: Activity as tolerated Diet: Comfort feeding Wound Care: Reinforce dressing PRN as needed for comfort Follow-up: Hospice MD on arrival 
 
Discharge time: >35 minutes Joe Mccoy PA-C [facility] Hospitalist Division Office:  [phone] Pager: [phone] 6/17/2019, 12:20 PM

## 2019-06-17 NOTE — PROGRESS NOTES
Problem: Discharge Planning Goal: *Discharge to safe environment Outcome: Resolved/Met Home [facility]. Care Management Interventions PCP Verified by CM: Yes(Dr Crowell) Last Visit to PCP: 05/09/19 Mode of Transport at Discharge: ([facility]) Hospital Transport Time of Discharge: 1430 Transition of Care Consult (CM Consult): Home Hospice [employer]: Yes Physical Therapy Consult: Yes Occupational Therapy Consult: Yes Current Support Network: Relative's Home(son) [facility] Plan discussed with Pt/Family/Caregiver: Yes Discharge Location Discharge Placement: Home with hospice([facility]) Per [facility]  is at 1430 today and notified that Hospice has placed equipment in the home. PCS completed and faxed.

## 2019-06-17 NOTE — PROGRESS NOTES
1930 
Bedside SBAR report taken from [address] Patient lying in bed with no visitors and no TV on. Is oriented to her name and is slow to respond to questions. Will follow commands. Denies any pain at this time. Good mannered, she says please and thank you. 0000 Provided patient with water. She takes small sips from a cup and gets SOB when she does. Have to take 5 minute breaks every once in a while   
 
0200 Provided patient with Morphine due to pain in her buttocks area. Have repositioned her to see if that helps. Q 2 hour turning is being followed. [address] [place] [address] Rd., [address] Cleaned patient up, changed linens and checked her skin. Performed boubacar care and mouth care and turned her onto her left side. Delisa Martin Bedside SBAR report given to incoming RN Savannah Pandey.

## 2019-06-19 ENCOUNTER — HOME CARE VISIT (OUTPATIENT)
Dept: HOSPICE | Facility: HOSPICE | Age: 79
End: 2019-06-19
Payer: MEDICARE

## 2022-08-25 NOTE — PATIENT INSTRUCTIONS
Medicare Wellness Visit, Female The best way to live healthy is to have a lifestyle where you eat a well-balanced diet, exercise regularly, limit alcohol use, and quit all forms of tobacco/nicotine, if applicable. Regular preventive services are another way to keep healthy. Preventive services (vaccines, screening tests, monitoring & exams) can help personalize your care plan, which helps you manage your own care. Screening tests can find health problems at the earliest stages, when they are easiest to treat. [facility] follows the current, evidence-based guidelines published by the [facility] ([facility]) when recommending preventive services for our patients. Because we follow these guidelines, sometimes recommendations change over time as research supports it. (For example, mammograms used to be recommended annually. Even though Medicare will still pay for an annual mammogram, the newer guidelines recommend a mammogram every two years for women of average risk.) Of course, you and your doctor may decide to screen more often for some diseases, based on your risk and your health status. Preventive services for you include: - Medicare offers their members a free annual wellness visit, which is time for you and your primary care provider to discuss and plan for your preventive service needs. Take advantage of this benefit every year! 
-All adults over the age of 72 should receive the recommended pneumonia vaccines. Current USPSTF guidelines recommend a series of two vaccines for the best pneumonia protection.  
-All adults should have a flu vaccine yearly and a tetanus vaccine every 10 years. All adults age 61 and older should receive a shingles vaccine once in their lifetime.   
-A bone mass density test is recommended when a woman turns 65 to screen for osteoporosis. This test is only recommended one time, as a screening. Some providers will use this same test as a disease monitoring tool if you already have osteoporosis. -All adults age 38-68 who are overweight should have a diabetes screening test once every three years.  
-Other screening tests and preventive services for persons with diabetes include: an eye exam to screen for diabetic retinopathy, a kidney function test, a foot exam, and stricter control over your cholesterol.  
-Cardiovascular screening for adults with routine risk involves an electrocardiogram (ECG) at intervals determined by your doctor.  
-Colorectal cancer screenings should be done for adults age 54-65 with no increased risk factors for colorectal cancer. There are a number of acceptable methods of screening for this type of cancer. Each test has its own benefits and drawbacks. Discuss with your doctor what is most appropriate for you during your annual wellness visit. The different tests include: colonoscopy (considered the best screening method), a fecal occult blood test, a fecal DNA test, and sigmoidoscopy. -Breast cancer screenings are recommended every other year for women of normal risk, age 54-69. 
-Cervical cancer screenings for women over age 72 are only recommended with certain risk factors.  
-All adults born between [facility] should be screened once for Hepatitis C. Here is a list of your current Health Maintenance items (your personalized list of preventive services) with a due date: 
Health Maintenance Due Topic Date Due  
 Flu Vaccine  08/01/2018  Albumin Urine Test  08/04/2018 [address] [place] [address] Annual Well Visit  08/05/2018  Hemoglobin A1C    09/23/2018 Wound Care: Petrol[place]